# Patient Record
Sex: FEMALE | Race: WHITE | Employment: OTHER | URBAN - METROPOLITAN AREA
[De-identification: names, ages, dates, MRNs, and addresses within clinical notes are randomized per-mention and may not be internally consistent; named-entity substitution may affect disease eponyms.]

---

## 2017-01-09 ENCOUNTER — TELEPHONE (OUTPATIENT)
Dept: ONCOLOGY | Facility: CLINIC | Age: 82
End: 2017-01-09

## 2017-01-09 NOTE — TELEPHONE ENCOUNTER
Prior Authorization needed for Agrylin received.  Patient to have labs and follow up prior to fill.  Called and informed patient.  Appointments scheduled with .  Lab on 1/13/17 and follow up with Dr. Pelaez on 1/19/17.      Emmanuel Lucas, RN, BSN, OCN  Oncology Care Coordinator RN  [facility]  [phone]  1/9/2017, 9:20 AM

## 2017-01-13 DIAGNOSIS — D47.3 ET (ESSENTIAL THROMBOCYTHEMIA) (H): ICD-10-CM

## 2017-01-13 LAB
ANISOCYTOSIS BLD QL SMEAR: SLIGHT
BASOPHILS # BLD AUTO: 0 10E9/L (ref 0–0.2)
BASOPHILS NFR BLD AUTO: 0.4 %
DIFFERENTIAL METHOD BLD: ABNORMAL
EOSINOPHIL # BLD AUTO: 0.2 10E9/L (ref 0–0.7)
EOSINOPHIL NFR BLD AUTO: 2.6 %
ERYTHROCYTE [DISTWIDTH] IN BLOOD BY AUTOMATED COUNT: 15 % (ref 10–15)
HCT VFR BLD AUTO: 37.4 % (ref 35–47)
HGB BLD-MCNC: 11.8 G/DL (ref 11.7–15.7)
IMM GRANULOCYTES # BLD: 0 10E9/L (ref 0–0.4)
IMM GRANULOCYTES NFR BLD: 0.3 %
LYMPHOCYTES # BLD AUTO: 2.2 10E9/L (ref 0.8–5.3)
LYMPHOCYTES NFR BLD AUTO: 23.9 %
MCH RBC QN AUTO: 27.8 PG (ref 26.5–33)
MCHC RBC AUTO-ENTMCNC: 31.6 G/DL (ref 31.5–36.5)
MCV RBC AUTO: 88 FL (ref 78–100)
MONOCYTES # BLD AUTO: 1.1 10E9/L (ref 0–1.3)
MONOCYTES NFR BLD AUTO: 11.3 %
NEUTROPHILS # BLD AUTO: 5.7 10E9/L (ref 1.6–8.3)
NEUTROPHILS NFR BLD AUTO: 61.5 %
PLATELET # BLD AUTO: 762 10E9/L (ref 150–450)
PLATELET # BLD EST: ABNORMAL 10*3/UL
RBC # BLD AUTO: 4.25 10E12/L (ref 3.8–5.2)
WBC # BLD AUTO: 9.3 10E9/L (ref 4–11)

## 2017-01-13 PROCEDURE — 36415 COLL VENOUS BLD VENIPUNCTURE: CPT | Performed by: FAMILY MEDICINE

## 2017-01-13 PROCEDURE — 85025 COMPLETE CBC W/AUTO DIFF WBC: CPT | Performed by: FAMILY MEDICINE

## 2017-01-16 DIAGNOSIS — D47.3 ET (ESSENTIAL THROMBOCYTHEMIA) (H): Primary | ICD-10-CM

## 2017-01-16 RX ORDER — ANAGRELIDE 0.5 MG/1
CAPSULE ORAL
Qty: 48 CAPSULE | Refills: 3 | Status: SHIPPED | OUTPATIENT
Start: 2017-01-16 | End: 2017-08-14

## 2017-01-16 NOTE — TELEPHONE ENCOUNTER
Prior authorization denied per Eva at [facility].  Agrylin will not be covered as there is a generic available.  Anagrelide will be covered by patient's insurance.  OK per Dr. Pelaez to use the generic.  New prescription sent to [facility] pharmacy.

## 2017-01-19 ENCOUNTER — ONCOLOGY VISIT (OUTPATIENT)
Dept: ONCOLOGY | Facility: CLINIC | Age: 82
End: 2017-01-19
Attending: INTERNAL MEDICINE
Payer: MEDICARE

## 2017-01-19 VITALS
HEIGHT: 63 IN | DIASTOLIC BLOOD PRESSURE: 74 MMHG | TEMPERATURE: 99.4 F | OXYGEN SATURATION: 97 % | HEART RATE: 60 BPM | RESPIRATION RATE: 18 BRPM | WEIGHT: 104.3 LBS | SYSTOLIC BLOOD PRESSURE: 145 MMHG | BODY MASS INDEX: 18.48 KG/M2

## 2017-01-19 DIAGNOSIS — D64.9 ANEMIA, UNSPECIFIED TYPE: ICD-10-CM

## 2017-01-19 DIAGNOSIS — D47.2 MGUS (MONOCLONAL GAMMOPATHY OF UNKNOWN SIGNIFICANCE): ICD-10-CM

## 2017-01-19 DIAGNOSIS — D47.3 ET (ESSENTIAL THROMBOCYTHEMIA) (H): Primary | ICD-10-CM

## 2017-01-19 PROCEDURE — 99214 OFFICE O/P EST MOD 30 MIN: CPT | Performed by: INTERNAL MEDICINE

## 2017-01-19 PROCEDURE — 99211 OFF/OP EST MAY X REQ PHY/QHP: CPT

## 2017-01-19 RX ORDER — ANAGRELIDE HYDROCHLORIDE 0.5 MG/1
CAPSULE ORAL
Qty: 48 CAPSULE | Refills: 3 | Status: SHIPPED | OUTPATIENT
Start: 2017-01-19 | End: 2017-06-29

## 2017-01-19 ASSESSMENT — PAIN SCALES - GENERAL: PAINLEVEL: NO PAIN (0)

## 2017-01-19 NOTE — MR AVS SNAPSHOT
After Visit Summary   1/19/2017    Maggie Baires    MRN: [id number]           Patient Information     Date Of Birth          1/10/1928        Visit Information        Provider Department      1/19/2017 2:45 PM Annabel Pelaez MD [facility]        Today's Diagnoses     ET (essential thrombocythemia) (H)    -  1     MGUS (monoclonal gammopathy of unknown significance)         Anemia, unspecified type            Follow-ups after your visit        Your next 10 appointments already scheduled     Feb 22, 2017  9:30 AM   Phone Device Check with [place] [facility]   [facility] ([facility])    [address] [address]   [phone]            May 04, 2017  1:30 PM   LAB with [facility]   [facility]    [address] [place] [id number] [phone]           Patient must bring picture ID.  Patient should be prepared to give a urine specimen  Please do not eat 10-12 hours before your appointment if you are coming in fasting for labs on lipids, cholesterol, or glucose (sugar).  Pregnant women should follow their Care Team instructions. Water with medications is okay. Do not drink coffee or other fluids.   If you have concerns about taking  your medications, please ask at office or if scheduling via Weavlyt, send a message by clicking on Secure Messaging, Message Your Care Team.            May 11, 2017 11:00 AM   Return Visit with Annabel Pelaez MD   [facility] ([facility])    [facility]  [address]  [facility] [address]   [phone]              Future tests that were ordered for you today     Open Future Orders        Priority Expected Expires Ordered    CBC with platelets differential Routine 5/1/2017 6/30/2017 1/19/2017    Basic metabolic panel Routine 5/1/2017 6/30/2017 1/19/2017    Protein electrophoresis Routine 5/1/2017  "2017            Who to contact     If you have questions or need follow up information about today's clinic visit or your schedule please contact [facility] directly at [phone].  Normal or non-critical lab and imaging results will be communicated to you by MyChart, letter or phone within 4 business days after the clinic has received the results. If you do not hear from us within 7 days, please contact the clinic through MyChart or phone. If you have a critical or abnormal lab result, we will notify you by phone as soon as possible.  Submit refill requests through Trendmeon or call your pharmacy and they will forward the refill request to us. Please allow 3 business days for your refill to be completed.          Additional Information About Your Visit        [employer] Information     T[employer]meon lets you send messages to your doctor, view your test results, renew your prescriptions, schedule appointments and more. To sign up, go to [email or web address] . Click on \"Log in\" on the left side of the screen, which will take you to the Welcome page. Then click on \"Sign up Now\" on the right side of the page.     You will be asked to enter the access code listed below, as well as some personal information. Please follow the directions to create your username and password.     Your access code is: [email or web address]  Expires: 2017  3:58 PM     Your access code will  in 90 days. If you need help or a new code, please call your [place] clinic or [phone].        Care EveryWhere ID     This is your Care EveryWhere ID. This could be used by other organizations to access your [place] medical records  [id number]        Your Vitals Were     Pulse Temperature Respirations    60 99.4  F (37.4  C) (Tympanic) 18    Height BMI (Body Mass Index) Pulse Oximetry    1.588 m (5' 2.5\") 18.76 kg/m2 97%    Breastfeeding?          No         Blood Pressure from Last 3 Encounters:   17 145/74   10/18/16 " 122/59   06/07/16 160/82    Weight from Last 3 Encounters:   01/19/17 47.31 kg (104 lb 4.8 oz)   10/18/16 48.081 kg (106 lb)   06/07/16 48.535 kg (107 lb)                 Where to get your medicines      These medications were sent to [id number] - [place], [place] - [address]  [address] [facility] [address]    Hours:  Closed 10-16-08 business to [facility] Phone:  [phone]    - AGRYLIN 0.5 MG capsule       Primary Care Provider Office Phone # Fax #    Dorian Jensen [phone]       [address] [address]        Thank you!     Thank you for choosing [facility]  for your care. Our goal is always to provide you with excellent care. Hearing back from our patients is one way we can continue to improve our services. Please take a few minutes to complete the written survey that you may receive in the mail after your visit with us. Thank you!             Your Updated Medication List - Protect others around you: Learn how to safely use, store and throw away your medicines at www.disposemymeds.org.          This list is accurate as of: 1/19/17  3:58 PM.  Always use your most recent med list.                   Brand Name Dispense Instructions for use    * anagrelide 0.5 MG capsule    AGRYLIN    48 capsule    Take 1 capsule (0.5 mg) by mouth on Monday, Wednesday, Friday, and take 2 capsules (1 mg) on Tuesday, Thursday, Saturday and Sunday.       * AGRYLIN 0.5 MG capsule   Generic drug:  anagrelide     48 capsule    Take 1 capsule (0.5 mg) by mouth on Monday, Wednesday, Friday, and take 2 capsules (1 mg) on Tuesday, Thursday, Saturday and Sunday.       aspirin 81 MG tablet     30 tablet    Take by mouth daily       atenolol 25 MG tablet    TENORMIN    90 tablet    Take 1 tablet (25 mg) by mouth daily       hypromellose 0.5 % Soln ophthalmic solution    ARTIFICIAL TEARS     Place 1 drop into both eyes 4  times daily as needed       levothyroxine 75 MCG tablet    SYNTHROID/LEVOTHROID    90 tablet    Take 1 tablet (75 mcg) by mouth daily       VITAMIN D (CHOLECALCIFEROL) PO      Take 2,000 Units by mouth daily       * Notice:  This list has 2 medication(s) that are the same as other medications prescribed for you. Read the directions carefully, and ask your doctor or other care provider to review them with you.

## 2017-01-19 NOTE — PROGRESS NOTES
"CHIEF COMPLAINT AND REASON FOR VISIT:   ET on anagrelide,   IgG kappa M protein     HISTORY OF PRESENT ILLNESS: She was diagnosed with essential thrombocytosis since 2004. She has been getting anagrelide treatment for years and her platelet count has been fluctuating between 1.3 million to around 500. She tolerates anagrelide fair. She had extreme thrombocytosis.   She was on anagrelide 1.5 mg a day, 2 of the 0.5 mg tablets in the morning, 0.5 mg tabletsin the evening, this was started mid 10/20/2009. The dose was adjusted prior to that. She has no thrombosis history. Then dose of anagrelide is 1 mg p.o. q.a.m. and 0.5 mg p.o. q.p.m. Monday through Friday and 0.5 mg p.o. bid Saturday and Sunday.   The dose was again reduced in 10/2013 due to dropping PL so she is on 0.5 mg po bid. She refuses to change to other med since it has been working well for her. She starts to have anemia in 2016. Does is further reduced.    Also accidental  M IgG kappa protein 0.3 gram in 2009, has MGUS and is on follow up.     OTHER MEDICAL PROBLEMS: Hypothyroidism, osteoarthritis, ET, cardiac dysrhythmia (SSS), pacemaker years ago, cataract. P. HTN. CAD in 11/2013, heart attack 8/2014    MEDICATIONS: Reviewed in Epic system.     ALLERGIES: Andrea had extensive skin reaction.      SOCIAL HISTORY: Retired, very active lady. Lives in [place].  She lives in an apartment. She is . Her daughter is close by.     REVIEW OF SYSTEMS: She is in her usual state of health. Claims she is full of energy.  he is faithfully taking anagrelide.   She denied any bleeding episode, any thrombosis episode, any paresthesia of her fingertips and toes, any bone pain, constipation.   She has irregular BM.   She volunteers in the community.     PHYSICAL EXAMINATION:   VITAL SIGNS: Blood pressure 145/74, pulse 60, temperature 99.4  F (37.4  C), temperature source Tympanic, resp. rate 18, height 1.588 m (5' 2.5\"), weight 47.31 kg (104 lb 4.8 oz), " SpO2 97 %, not currently breastfeeding.  GENERAL APPEARANCE: Elderly lady, looks much younger than her stated age, not in acute distress. She always looks very thin but healthy.   EXTREMITIES: Multiple varicose veins and joints deformity.  HEENT: The patient is normocephalic, atraumatic. Pupils are equal react to light. Sclerae are anicteric. Moist oral mucosa. Negative pharynx. No oral thrush.   NECK: Supple. No jugular venous distention. Thyroid is not palpable.   LYMPH NODES: Superficial lymphadenopathy is not appreciable in the bilateral cervical, supraclavicular, axillary or inguinal adenopathy.   CARDIOVASCULAR: S1, S2 regular with no murmurs or gallops. No carotid or abdominal bruits. Left upper CW pacemaker.   PULMONARY: Lungs are clear to auscultation and percussion bilaterally. There is no wheezing or rhonchi.   GASTROINTESTINAL: Abdomen is soft, nontender. No hepatosplenomegaly. No signs of ascites. No mass appreciable.   NEUROLOGIC: Cranial nerves II-XII are grossly intact. Sensation intact. Muscle strength and muscle tone symmetrical all through 5/5.   BACK: No spinal or paraspinal tenderness. No CVA tenderness.   SKIN: bulging erythematous rash on nasol upper bridge.    CURRENT LAB DATA:   1/2017 wbc/hb nl, -800    OLD DATA REVIEW IN SUMMARY:  Serum protein electrophoresis 0.3 in 4/2016, in 10/2015 stabilized 0.2 gm stable     Immunofixation indicating 0.3 gm IgG kappa in 10/2009. She was thrombocytopenic in the later part of 2009, platelets between 100 and 130 when anagrelide dose was adjusted.     ASSESSMENT AND PLAN:   1. Essential thrombocythemia (ET) since 2004. She has been anagrelide for yrs, dose has been reducing.   She is on 0.5 mg po Mon, Wed, Friday, 1 mg the rest of wk.     She understand ET is one form of myeloproliferative disease and she needs to be watched for transforming into leukemia and she needs to be on this platelet agent for the rest of her life and her disease may  change. She needs to see hematology for the rest of her life.     2. Monoclonal gammopathy of unknown significance (MGUS). Monitor her for end organ function and at this point will see her every 6 months.     3.normacytic anemia new in 2016. Likely anagrelide side effects, dose reduction helped this.

## 2017-01-19 NOTE — NURSING NOTE
"Maggie Baires is a 89 year old female who presents for:  Chief Complaint   Patient presents with     Hematology     9 month recheck Anemia, review labs         Initial Vitals:  /74 mmHg  Pulse 60  Temp(Src) 99.4  F (37.4  C) (Tympanic)  Resp 18  Ht 1.588 m (5' 2.5\")  Wt 47.31 kg (104 lb 4.8 oz)  BMI 18.76 kg/m2  SpO2 97%  Breastfeeding? No Estimated body mass index is 18.76 kg/(m^2) as calculated from the following:    Height as of this encounter: 1.588 m (5' 2.5\").    Weight as of this encounter: 47.31 kg (104 lb 4.8 oz).. Body surface area is 1.44 meters squared. BP completed using cuff size: regular  No Pain (0) No LMP recorded. Patient is postmenopausal. Allergies and medications reviewed.     Medications: Medication refills not needed today.  Pharmacy name entered into [employer]: [id number] [address] [address] [facility] [address]    Comments: 9 month recheck Anemia, review labs. Patient has questions about why she is here and what's wrong with her?     10  minutes for nursing intake (face to face time)   Bebe Newsome CMA          "

## 2017-01-20 NOTE — NURSING NOTE
Agrylin not covered by insurance.  Pt must try generic first, ok per Dr. Pelaez.  Gave telephone okay to [facility] pharmacy in [place] to give generic of Agrylin to pt.

## 2017-01-26 ENCOUNTER — TELEPHONE (OUTPATIENT)
Dept: ONCOLOGY | Facility: CLINIC | Age: 82
End: 2017-01-26

## 2017-01-26 NOTE — TELEPHONE ENCOUNTER
Patient is very unhappy that the Agrylin is not covered by her insurance. She states she will only take the brand name, will not take the generic version.  Patient would like us to try to have her insurance company authorize the brand name again.    Prior authorization continues to be denied, appeal denied.  The insurance company states they will not authorize the brand name as there is a generic form available and not a documented reason why the patient is unable to take the generic form.  Dr. Pelaez has authorized a switch to the generic form.

## 2017-01-26 NOTE — TELEPHONE ENCOUNTER
Pt requesting to leave message that she is aware that Agrylin has been changed to generic due to insurance coverage and that in the past  she has tried generic and it caused an itchy rash on her legs. Pt currently has 1 month of brand Agrylin which she states she will take but is not going to  take the generic due to concern that she will get another rash if she takes it.    Diana Hanson RN/[facility]

## 2017-01-27 NOTE — TELEPHONE ENCOUNTER
Received call today from Rika Graff with the [employer] line with the patient on the other line.  Rika reports that patient is going to be able to get Agrylin as non generic is covered by a glenn through [employer].  Rx will need to be sent to [facility] in the future and patient will provide pharmacy with card and information.  No other action needed by Oncology.  No other questions or concerns at this time.    Emmanuel Lucas RN, BSN, OCN  1/27/2017, 11:46 AM

## 2017-02-09 ENCOUNTER — TELEPHONE (OUTPATIENT)
Dept: FAMILY MEDICINE | Facility: CLINIC | Age: 82
End: 2017-02-09

## 2017-02-09 DIAGNOSIS — D47.3 ET (ESSENTIAL THROMBOCYTHEMIA) (H): Primary | ICD-10-CM

## 2017-02-09 NOTE — TELEPHONE ENCOUNTER
Maggie is asking for a copy of her med list to give for her rent reduction.  She says she has been told to take Ensure- 2 cans a day.  She wants this added to her list.  She will be to the office soon to  ([place]) . Please put at the .

## 2017-02-09 NOTE — TELEPHONE ENCOUNTER
Medication list updated to include 2 cans of ensure a day  Medication list placed at  for   Kristi Warren RN

## 2017-02-13 ENCOUNTER — TELEPHONE (OUTPATIENT)
Dept: ONCOLOGY | Facility: CLINIC | Age: 82
End: 2017-02-13

## 2017-02-13 NOTE — TELEPHONE ENCOUNTER
"Patient calling to report that she attempted to  Agrylin from the [facility] and they said there was no Rx and that she needed to have Oncology connect with them.  Writing nurse called pharmacy.  They were updated that patient does use \"Good Day\" assistance and brings a card with to pay for it.  No insurance coverage due to it being non-generic.  Pharmacy has to run it through insurance again as they did last time because there was partial coverage.  They did not understand how \"Good Day\" assistance work and were unfamiliar with it.  Prior Auth has been initiated.  Will wait for prior auth and call.    Emmanuel Lucas RN, BSN, OCN  2/13/2017, 9:48 AM      "

## 2017-02-13 NOTE — TELEPHONE ENCOUNTER
Received fax requesting prior auth.  Reviewed chart and Rx has not been approved in the past.  Called pharmacy back and they report that [employer] line connected with them and they are to run as cash.  At this no further information required from Oncology.  No need for PA.  Medication will be ran through as cash and patient will  using the Good Day Program.  Pharmacy has connected with patient and patient is aware.    Emmanuel Lucas RN, BSN, OCN  2/13/2017, 10:29 AM

## 2017-02-15 NOTE — TELEPHONE ENCOUNTER
Called Pt to f/u on her Agrylin medication. Pt stated that she picked up her name brand prescription for aprox $12 and has copayment assistance through Good days. Pt again stated that she is not willing to take the generic of Agrylin and will continue to fill and pay for the prescription this way moving forward. Pt states that she will give us a call back if she has any other que/concerns.

## 2017-02-22 ENCOUNTER — HOSPITAL ENCOUNTER (OUTPATIENT)
Dept: CARDIOLOGY | Facility: CLINIC | Age: 82
Discharge: HOME | End: 2017-02-22
Attending: INTERNAL MEDICINE | Admitting: INTERNAL MEDICINE
Payer: MEDICARE

## 2017-02-22 PROCEDURE — 93293 PM PHONE R-STRIP DEVICE EVAL: CPT

## 2017-02-22 PROCEDURE — 93293 PM PHONE R-STRIP DEVICE EVAL: CPT | Mod: 26 | Performed by: INTERNAL MEDICINE

## 2017-02-22 NOTE — PROGRESS NOTES
~~90 DAY PPM TELETRACE~~Appropriate AP/VS at time of check. 6 sec stripes of each pre-magnet, magnet and post-magnet were saved. Magnet response WNL. F/U scheduled for in clinic annual thresholds 5/23/17 @1:40pm w/ device RN.

## 2017-02-28 DIAGNOSIS — E03.9 HYPOTHYROIDISM, UNSPECIFIED TYPE: ICD-10-CM

## 2017-02-28 RX ORDER — LEVOTHYROXINE SODIUM 75 UG/1
75 TABLET ORAL DAILY
Qty: 14 TABLET | Refills: 0 | Status: SHIPPED | OUTPATIENT
Start: 2017-02-28 | End: 2017-03-10

## 2017-02-28 NOTE — TELEPHONE ENCOUNTER
TSH   Date Value Ref Range Status   08/18/2016 0.34 (L) 0.40 - 4.00 mU/L Final   08/25/2015 0.33 (L) 0.40 - 4.00 mU/L Final   01/22/2015 0.51 0.40 - 4.00 mU/L Final     Comment:     Effective 7/30/2014, the reference range for this assay has changed to reflect   new instrumentation/methodology.     11/29/2013 1.50 0.4 - 5.0 mU/L Final   10/15/2013 9.04 (H) 0.4 - 5.0 mU/L Final   Recorded by Dorian Jensen MD on 8/20/2016 at 12:01 PM  Please  notify of the normal results. Have her stay with the current medication and recheck in 6 months. Dorian Jensen      Refilled x14 days.  LM advising lab only tana Moffett RN

## 2017-02-28 NOTE — TELEPHONE ENCOUNTER
levothyroxine (SYNTHROID, LEVOTHROID) 75 MCG tablet     Last Written Prescription Date: 8/22/16  Last Quantity: 90, # refills: 1  Last Office Visit with DANIEL, DALE or [facility] prescribing provider:6/7/16   Next 5 appointments (look out 90 days)     May 11, 2017 11:00 AM CDT   Return Visit with Annabel Pelaez MD   [facility] ([facility])    [facility]  [address]  [facility] [place] [address]   [phone]                   TSH   Date Value Ref Range Status   08/18/2016 0.34 (L) 0.40 - 4.00 mU/L Final

## 2017-03-01 DIAGNOSIS — E03.9 HYPOTHYROIDISM: ICD-10-CM

## 2017-03-01 DIAGNOSIS — I24.9 ACS (ACUTE CORONARY SYNDROME) (H): ICD-10-CM

## 2017-03-01 LAB
T4 FREE SERPL-MCNC: 1.3 NG/DL (ref 0.76–1.46)
TSH SERPL DL<=0.005 MIU/L-ACNC: 0.34 MU/L (ref 0.4–4)

## 2017-03-01 PROCEDURE — 84443 ASSAY THYROID STIM HORMONE: CPT | Performed by: FAMILY MEDICINE

## 2017-03-01 PROCEDURE — 36415 COLL VENOUS BLD VENIPUNCTURE: CPT | Performed by: FAMILY MEDICINE

## 2017-03-01 PROCEDURE — 84439 ASSAY OF FREE THYROXINE: CPT | Performed by: FAMILY MEDICINE

## 2017-03-10 DIAGNOSIS — E03.9 HYPOTHYROIDISM, UNSPECIFIED TYPE: ICD-10-CM

## 2017-03-10 RX ORDER — LEVOTHYROXINE SODIUM 75 UG/1
75 TABLET ORAL DAILY
Qty: 90 TABLET | Refills: 3 | Status: SHIPPED | OUTPATIENT
Start: 2017-03-10 | End: 2018-01-01

## 2017-03-10 NOTE — TELEPHONE ENCOUNTER
TSH   Date Value Ref Range Status   03/01/2017 0.34 (L) 0.40 - 4.00 mU/L Final   08/18/2016 0.34 (L) 0.40 - 4.00 mU/L Final   08/25/2015 0.33 (L) 0.40 - 4.00 mU/L Final   01/22/2015 0.51 0.40 - 4.00 mU/L Final     Comment:     Effective 7/30/2014, the reference range for this assay has changed to reflect   new instrumentation/methodology.     11/29/2013 1.50 0.4 - 5.0 mU/L Final       Notes Recorded by Dorian Jensen MD on 3/2/2017 at 7:54 AM  Please let this lady no other thyroid tests are fine. No change in her medication.Dorian Jensen    Refill given.  EDIE Moffett RN

## 2017-04-09 DIAGNOSIS — I24.9 ACS (ACUTE CORONARY SYNDROME) (H): ICD-10-CM

## 2017-04-10 RX ORDER — ATENOLOL 25 MG/1
TABLET ORAL
Qty: 30 TABLET | Refills: 1 | Status: SHIPPED | OUTPATIENT
Start: 2017-04-10 | End: 2017-05-31

## 2017-04-10 NOTE — TELEPHONE ENCOUNTER
Atenolol 25 mg      Last Written Prescription Date: 12/22/16  Last Fill Quantity: 90, # refills: 0  Last Office Visit with [facility] or [facility] prescribing provider: 6/7/16  Next 5 appointments (look out 90 days)     May 11, 2017 11:00 AM CDT   Return Visit with Annabel Pelaez MD   [facility] ([facility])    [facility]  [address] [address]   [phone]                   Potassium   Date Value Ref Range Status   06/02/2016 4.1 3.4 - 5.3 mmol/L Final     Creatinine   Date Value Ref Range Status   06/02/2016 1.25 (H) 0.52 - 1.04 mg/dL Final     BP Readings from Last 3 Encounters:   01/19/17 145/74   10/18/16 122/59   06/07/16 160/82

## 2017-05-04 DIAGNOSIS — D47.2 MGUS (MONOCLONAL GAMMOPATHY OF UNKNOWN SIGNIFICANCE): ICD-10-CM

## 2017-05-04 DIAGNOSIS — D47.3 ET (ESSENTIAL THROMBOCYTHEMIA) (H): ICD-10-CM

## 2017-05-04 LAB
ANION GAP SERPL CALCULATED.3IONS-SCNC: 8 MMOL/L (ref 3–14)
BASOPHILS # BLD AUTO: 0 10E9/L (ref 0–0.2)
BASOPHILS NFR BLD AUTO: 0.3 %
BUN SERPL-MCNC: 21 MG/DL (ref 7–30)
CALCIUM SERPL-MCNC: 9.5 MG/DL (ref 8.5–10.1)
CHLORIDE SERPL-SCNC: 100 MMOL/L (ref 94–109)
CO2 SERPL-SCNC: 27 MMOL/L (ref 20–32)
CREAT SERPL-MCNC: 0.98 MG/DL (ref 0.52–1.04)
DIFFERENTIAL METHOD BLD: ABNORMAL
EOSINOPHIL # BLD AUTO: 0.3 10E9/L (ref 0–0.7)
EOSINOPHIL NFR BLD AUTO: 2.8 %
ERYTHROCYTE [DISTWIDTH] IN BLOOD BY AUTOMATED COUNT: 14.7 % (ref 10–15)
GFR SERPL CREATININE-BSD FRML MDRD: 53 ML/MIN/1.7M2
GLUCOSE SERPL-MCNC: 80 MG/DL (ref 70–99)
HCT VFR BLD AUTO: 36.4 % (ref 35–47)
HGB BLD-MCNC: 11.6 G/DL (ref 11.7–15.7)
IMM GRANULOCYTES # BLD: 0 10E9/L (ref 0–0.4)
IMM GRANULOCYTES NFR BLD: 0.3 %
LYMPHOCYTES # BLD AUTO: 2.3 10E9/L (ref 0.8–5.3)
LYMPHOCYTES NFR BLD AUTO: 24 %
MCH RBC QN AUTO: 28.5 PG (ref 26.5–33)
MCHC RBC AUTO-ENTMCNC: 31.9 G/DL (ref 31.5–36.5)
MCV RBC AUTO: 89 FL (ref 78–100)
MONOCYTES # BLD AUTO: 1.1 10E9/L (ref 0–1.3)
MONOCYTES NFR BLD AUTO: 11.5 %
NEUTROPHILS # BLD AUTO: 5.7 10E9/L (ref 1.6–8.3)
NEUTROPHILS NFR BLD AUTO: 61.1 %
PLATELET # BLD AUTO: 821 10E9/L (ref 150–450)
POTASSIUM SERPL-SCNC: 4.2 MMOL/L (ref 3.4–5.3)
RBC # BLD AUTO: 4.07 10E12/L (ref 3.8–5.2)
SODIUM SERPL-SCNC: 135 MMOL/L (ref 133–144)
WBC # BLD AUTO: 9.4 10E9/L (ref 4–11)

## 2017-05-04 PROCEDURE — 36415 COLL VENOUS BLD VENIPUNCTURE: CPT | Performed by: FAMILY MEDICINE

## 2017-05-04 PROCEDURE — 80048 BASIC METABOLIC PNL TOTAL CA: CPT | Performed by: INTERNAL MEDICINE

## 2017-05-04 PROCEDURE — 00000402 ZZHCL STATISTIC TOTAL PROTEIN: Performed by: INTERNAL MEDICINE

## 2017-05-04 PROCEDURE — 85025 COMPLETE CBC W/AUTO DIFF WBC: CPT | Performed by: FAMILY MEDICINE

## 2017-05-04 PROCEDURE — 84165 PROTEIN E-PHORESIS SERUM: CPT | Performed by: INTERNAL MEDICINE

## 2017-05-05 LAB
ALBUMIN SERPL ELPH-MCNC: 4.1 G/DL (ref 3.7–5.1)
ALPHA1 GLOB SERPL ELPH-MCNC: 0.3 G/DL (ref 0.2–0.4)
ALPHA2 GLOB SERPL ELPH-MCNC: 0.8 G/DL (ref 0.5–0.9)
B-GLOBULIN SERPL ELPH-MCNC: 0.8 G/DL (ref 0.6–1)
GAMMA GLOB SERPL ELPH-MCNC: 1.3 G/DL (ref 0.7–1.6)
M PROTEIN SERPL ELPH-MCNC: 0.3 G/DL
PROT PATTERN SERPL ELPH-IMP: ABNORMAL

## 2017-05-15 ENCOUNTER — ONCOLOGY VISIT (OUTPATIENT)
Dept: ONCOLOGY | Facility: CLINIC | Age: 82
End: 2017-05-15
Attending: INTERNAL MEDICINE
Payer: MEDICARE

## 2017-05-15 VITALS
TEMPERATURE: 98.8 F | BODY MASS INDEX: 17.63 KG/M2 | HEART RATE: 99 BPM | HEIGHT: 65 IN | DIASTOLIC BLOOD PRESSURE: 79 MMHG | OXYGEN SATURATION: 99 % | RESPIRATION RATE: 18 BRPM | WEIGHT: 105.8 LBS | SYSTOLIC BLOOD PRESSURE: 146 MMHG

## 2017-05-15 DIAGNOSIS — D47.3 ET (ESSENTIAL THROMBOCYTHEMIA) (H): Primary | ICD-10-CM

## 2017-05-15 DIAGNOSIS — D64.9 ANEMIA, UNSPECIFIED TYPE: ICD-10-CM

## 2017-05-15 DIAGNOSIS — D47.2 MGUS (MONOCLONAL GAMMOPATHY OF UNKNOWN SIGNIFICANCE): ICD-10-CM

## 2017-05-15 PROCEDURE — 99214 OFFICE O/P EST MOD 30 MIN: CPT | Performed by: INTERNAL MEDICINE

## 2017-05-15 PROCEDURE — 99211 OFF/OP EST MAY X REQ PHY/QHP: CPT

## 2017-05-15 ASSESSMENT — PAIN SCALES - GENERAL: PAINLEVEL: NO PAIN (0)

## 2017-05-15 NOTE — MR AVS SNAPSHOT
After Visit Summary   5/15/2017    Maggie Baires    MRN: [id number]           Patient Information     Date Of Birth          1/10/1928        Visit Information        Provider Department      5/15/2017 2:45 PM Annabel Pelaez MD [facility]        Today's Diagnoses     ET (essential thrombocythemia) (H)    -  1    MGUS (monoclonal gammopathy of unknown significance)        Anemia, unspecified type          Care Instructions    3 MONTHS F/U WITH LABS        Follow-ups after your visit        Your next 10 appointments already scheduled     May 23, 2017  1:40 PM CDT   [place] with [place] [facility]   [facility] ([facility])    [address] [facility] [address]   [phone]            Aug 07, 2017 11:30 AM CDT   LAB with NB LAB   [facility] ([facility]    [address] [place] [address]   [phone]           Patient must bring picture ID.  Patient should be prepared to give a urine specimen  Please do not eat 10-12 hours before your appointment if you are coming in fasting for labs on lipids, cholesterol, or glucose (sugar).  Pregnant women should follow their Care Team instructions. Water with medications is okay. Do not drink coffee or other fluids.   If you have concerns about taking  your medications, please ask at office or if scheduling via [employer], send a message by clicking on Secure Messaging, Message Your Care Team.            Aug 14, 2017 11:30 AM CDT   Return Visit with Annabel Pelaez MD   [facility] ([facility])    [facility]  [address] [facility] [address]  [facility] [address]   [phone]              Future tests that were ordered for you today     Open Future Orders        Priority Expected Expires Ordered    CBC with platelets differential Routine 8/1/2017 8/31/2017 5/15/2017    Basic metabolic panel Routine 8/1/2017 8/31/2017  "5/15/2017    Kappa and lambda light chain Routine 2017 2017 5/15/2017    Protein electrophoresis Routine 2017 2017 5/15/2017            Who to contact     If you have questions or need follow up information about today's clinic visit or your schedule please contact [facility] directly at [phone].  Normal or non-critical lab and imaging results will be communicated to you by [employer], letter or phone within 4 business days after the clinic has received the results. If you do not hear from us within 7 days, please contact the clinic through [employer] or phone. If you have a critical or abnormal lab result, we will notify you by phone as soon as possible.  Submit refill requests through [employer] or call your pharmacy and they will forward the refill request to us. Please allow 3 business days for your refill to be completed.          Additional Information About Your Visit        Honey[employer] Information     Contract Live lets you send messages to your doctor, view your test results, renew your prescriptions, schedule appointments and more. To sign up, go to [email or web address] . Click on \"Log in\" on the left side of the screen, which will take you to the Welcome page. Then click on \"Sign up Now\" on the right side of the page.     You will be asked to enter the access code listed below, as well as some personal information. Please follow the directions to create your username and password.     Your access code is: [email or web address]  Expires: 2017  3:48 PM     Your access code will  in 90 days. If you need help or a new code, please call your [place] clinic or [phone].        Care EveryWhere ID     This is your Care EveryWhere ID. This could be used by other organizations to access your [place] medical records  [id number]        Your Vitals Were     Pulse Temperature Respirations Height Pulse Oximetry Breastfeeding?    99 98.8  F (37.1  C) (Tympanic) 18 1.657 m (5' 5.25\") 99% No    " BMI (Body Mass Index)                   17.47 kg/m2            Blood Pressure from Last 3 Encounters:   05/15/17 146/79   01/19/17 145/74   10/18/16 122/59    Weight from Last 3 Encounters:   05/15/17 48 kg (105 lb 12.8 oz)   01/19/17 47.3 kg (104 lb 4.8 oz)   10/18/16 48.1 kg (106 lb)               Primary Care Provider Office Phone # Fax #    Dorian Jensen [phone]       [address] [address]        Thank you!     Thank you for choosing [facility]  for your care. Our goal is always to provide you with excellent care. Hearing back from our patients is one way we can continue to improve our services. Please take a few minutes to complete the written survey that you may receive in the mail after your visit with us. Thank you!             Your Updated Medication List - Protect others around you: Learn how to safely use, store and throw away your medicines at www.disposemymeds.org.          This list is accurate as of: 5/15/17  3:48 PM.  Always use your most recent med list.                   Brand Name Dispense Instructions for use    * anagrelide 0.5 MG capsule    AGRYLIN    48 capsule    Take 1 capsule (0.5 mg) by mouth on Monday, Wednesday, Friday, and take 2 capsules (1 mg) on Tuesday, Thursday, Saturday and Sunday.       * AGRYLIN 0.5 MG capsule   Generic drug:  anagrelide     48 capsule    Take 1 capsule (0.5 mg) by mouth on Monday, Wednesday, Friday, and take 2 capsules (1 mg) on Tuesday, Thursday, Saturday and Sunday.       aspirin 81 MG tablet     30 tablet    Take by mouth daily       atenolol 25 MG tablet    TENORMIN    30 tablet    TAKE ONE TABLET BY MOUTH ONE TIME DAILY       hypromellose 0.5 % Soln ophthalmic solution    ARTIFICIAL TEARS     Place 1 drop into both eyes 4 times daily as needed       levothyroxine 75 MCG tablet    SYNTHROID/LEVOTHROID    90 tablet    Take 1 tablet (75 mcg) by mouth daily       NEW  MED     60 Can    Reported on 5/15/2017       VITAMIN D (CHOLECALCIFEROL) PO      Take 2,000 Units by mouth daily       * Notice:  This list has 2 medication(s) that are the same as other medications prescribed for you. Read the directions carefully, and ask your doctor or other care provider to review them with you.

## 2017-05-15 NOTE — PATIENT INSTRUCTIONS
We would like to see you back in 3 months with labs prior. When you are in need of a refill, please call your pharmacy and they will send us a request.  Copy of appointments, and after visit summary (AVS) given to patient.  If you have any questions please call Galina Dacosta RN, BSN, OCN Oncology Hematology  [facility] [phone]. For questions after business hours, or on holidays/weekends, please call our after hours Nurse Triage line [phone]. Thank you.       3 MONTHS F/U WITH LABS

## 2017-05-15 NOTE — PROGRESS NOTES
"CHIEF COMPLAINT AND REASON FOR VISIT:   ET on anagrelide,   IgG kappa M protein     HISTORY OF PRESENT ILLNESS: She was diagnosed with essential thrombocytosis since 2004. She has been getting anagrelide treatment for years and her platelet count has been fluctuating between 1.3 million to around 500. She tolerates anagrelide fair. She had extreme thrombocytosis.   She was on anagrelide 1.5 mg a day, 2 of the 0.5 mg tablets in the morning, 0.5 mg tabletsin the evening, this was started mid 10/20/2009. The dose was adjusted prior to that. She has no thrombosis history. Then dose of anagrelide is 1 mg p.o. q.a.m. and 0.5 mg p.o. q.p.m. Monday through Friday and 0.5 mg p.o. bid Saturday and Sunday.   The dose was again reduced in 10/2013 due to dropping PL so she is on 0.5 mg po bid. She refuses to change to other med since it has been working well for her. She starts to have anemia in 2016. Does is further reduced.    Also accidental  M IgG kappa protein 0.3 gram in 2009, has MGUS and is on follow up.     OTHER MEDICAL PROBLEMS: Hypothyroidism, osteoarthritis, ET, cardiac dysrhythmia (SSS), pacemaker years ago, cataract. P. HTN. CAD in 11/2013, heart attack 8/2014    MEDICATIONS: Reviewed in Epic system.     ALLERGIES: Andrea had extensive skin reaction.      SOCIAL HISTORY: Retired, very active lady. Lives in [place].  She lives in an apartment. She is . Her daughter is close by.     REVIEW OF SYSTEMS:   She is in her usual state of health.   she is faithfully taking anagrelide.   She denied any bleeding episode, any thrombosis episode, any paresthesia of her fingertips and toes, any bone pain, constipation.   She has irregular BM.   She volunteers in the community.     PHYSICAL EXAMINATION:   VITAL SIGNS: Blood pressure 146/79, pulse 99, temperature 98.8  F (37.1  C), temperature source Tympanic, resp. rate 18, height 1.657 m (5' 5.25\"), weight 48 kg (105 lb 12.8 oz), SpO2 99 %, not currently " breastfeeding.  GENERAL APPEARANCE: Elderly lady, looks much younger than her stated age, not in acute distress. She always looks very thin but healthy.   EXTREMITIES: Multiple varicose veins and joints deformity.  HEENT: The patient is normocephalic, atraumatic. Pupils are equal react to light. Sclerae are anicteric. Moist oral mucosa. Negative pharynx. No oral thrush.   NECK: Supple. No jugular venous distention. Thyroid is not palpable.   LYMPH NODES: Superficial lymphadenopathy is not appreciable in the bilateral cervical, supraclavicular, axillary or inguinal adenopathy.   CARDIOVASCULAR: S1, S2 regular with no murmurs or gallops. No carotid or abdominal bruits. Left upper CW pacemaker.   PULMONARY: Lungs are clear to auscultation and percussion bilaterally. There is no wheezing or rhonchi.   GASTROINTESTINAL: Abdomen is soft, nontender. No hepatosplenomegaly. No signs of ascites. No mass appreciable.   NEUROLOGIC: Cranial nerves II-XII are grossly intact. Sensation intact. Muscle strength and muscle tone symmetrical all through 5/5.   BACK: No spinal or paraspinal tenderness. No CVA tenderness.   SKIN: bulging erythematous rash on nasol upper bridge.    CURRENT LAB DATA:    wbc/hb nl, -800  Bmp nl Ca and Cr  spep 0.3 STABLE    OLD DATA REVIEW IN SUMMARY:  Serum protein electrophoresis 0.3 in 4/2016, in 10/2015 stabilized 0.2 gm stable     Immunofixation indicating 0.3 gm IgG kappa in 10/2009. She was thrombocytopenic in the later part of 2009, platelets between 100 and 130 when anagrelide dose was adjusted.     ASSESSMENT AND PLAN:   1. Essential thrombocythemia (ET) since 2004. She has been anagrelide for yrs, dose has been reducing.   She is on 0.5 mg po Mon, Wed, Friday, 1 mg the rest of wk.     She understand ET is one form of myeloproliferative disease and she needs to be watched for transforming into leukemia and she needs to be on this platelet agent for the rest of her life and her disease may  change. She needs to see hematology for the rest of her life.     2. Monoclonal gammopathy of unknown significance (MGUS). Monitor her for end organ function and at this point will see her every 3 months.     3.normacytic anemia new in 2016. Likely anagrelide side effects, dose reduction helped this. THIS SO FAR IS STABLE.

## 2017-05-15 NOTE — NURSING NOTE
"Oncology Rooming Note    May 15, 2017 3:21 PM   Maggie Baires is a 89 year old female who presents for:    Chief Complaint   Patient presents with     Hematology     4 month recheck Anemia, review labs     Initial Vitals: /79 (BP Location: Right arm, Patient Position: Chair, Cuff Size: Adult Regular)  Pulse 99  Temp 98.8  F (37.1  C) (Tympanic)  Resp 18  Ht 1.657 m (5' 5.25\")  Wt 48 kg (105 lb 12.8 oz)  SpO2 99%  Breastfeeding? No  BMI 17.47 kg/m2 Estimated body mass index is 17.47 kg/(m^2) as calculated from the following:    Height as of this encounter: 1.657 m (5' 5.25\").    Weight as of this encounter: 48 kg (105 lb 12.8 oz). Body surface area is 1.49 meters squared.  No Pain (0) Comment: Data Unavailable   No LMP recorded. Patient is postmenopausal.  Allergies reviewed: Yes  Medications reviewed: Yes    Medications: Medication refills not needed today.  Pharmacy name entered into [employer]: [id number] [address] [place]    Clinical concerns 4 month recheck Anemia, review labs    7  minutes for nursing intake (face to face time)     Bebe Newsome CMA              "

## 2017-05-22 ENCOUNTER — OFFICE VISIT (OUTPATIENT)
Dept: FAMILY MEDICINE | Facility: CLINIC | Age: 82
End: 2017-05-22
Payer: MEDICARE

## 2017-05-22 VITALS
HEART RATE: 60 BPM | DIASTOLIC BLOOD PRESSURE: 90 MMHG | WEIGHT: 105 LBS | HEIGHT: 65 IN | BODY MASS INDEX: 17.49 KG/M2 | SYSTOLIC BLOOD PRESSURE: 160 MMHG | OXYGEN SATURATION: 99 % | TEMPERATURE: 98.9 F

## 2017-05-22 DIAGNOSIS — M54.2 CERVICALGIA: Primary | ICD-10-CM

## 2017-05-22 PROCEDURE — 99212 OFFICE O/P EST SF 10 MIN: CPT | Performed by: NURSE PRACTITIONER

## 2017-05-22 NOTE — PATIENT INSTRUCTIONS
Try a warm pack or moist towel for neck pain    Continue with massage    Follow up if symptoms do not improve or worsen.
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
## 2017-05-22 NOTE — MR AVS SNAPSHOT
After Visit Summary   5/22/2017    Maggie Baires    MRN: [id number]           Patient Information     Date Of Birth          1/10/1928        Visit Information        Provider Department      5/22/2017 2:00 PM Viktoriya Mas APRN CNP [facility]        Care Instructions    Try a warm pack or moist towel for neck pain    Continue with massage    Follow up if symptoms do not improve or worsen.          Follow-ups after your visit        Your next 10 appointments already scheduled     May 23, 2017  1:40 PM CDT   [place] with [place] [facility]   [facility] ([facility])    [address] [facility] [address]   [phone]            Aug 07, 2017 11:30 AM CDT   LAB with NB LAB   [facility] ([facility])    [address] [place] [id number] [phone]           Patient must bring picture ID.  Patient should be prepared to give a urine specimen  Please do not eat 10-12 hours before your appointment if you are coming in fasting for labs on lipids, cholesterol, or glucose (sugar).  Pregnant women should follow their Care Team instructions. Water with medications is okay. Do not drink coffee or other fluids.   If you have concerns about taking  your medications, please ask at office or if scheduling via [employer], send a message by clicking on Secure Messaging, Message Your Care Team.            Aug 14, 2017 11:30 AM CDT   Return Visit with Annabel Pelaez MD   [facility] ([facility])    [facility]  [address] [facility] [address]  [facility] [address]   [phone]              Who to contact     If you have questions or need follow up information about today's clinic visit or your schedule please contact [facility] directly at [phone].  Normal or non-critical lab and imaging results will be communicated to you by [employer],  "letter or phone within 4 business days after the clinic has received the results. If you do not hear from us within 7 days, please contact the clinic through [employer] or phone. If you have a critical or abnormal lab result, we will notify you by phone as soon as possible.  Submit refill requests through [employer] or call your pharmacy and they will forward the refill request to us. Please allow 3 business days for your refill to be completed.          Additional Information About Your Visit        [employer] Information     [employer] lets you send messages to your doctor, view your test results, renew your prescriptions, schedule appointments and more. To sign up, go to [email or web address] . Click on \"Log in\" on the left side of the screen, which will take you to the Welcome page. Then click on \"Sign up Now\" on the right side of the page.     You will be asked to enter the access code listed below, as well as some personal information. Please follow the directions to create your username and password.     Your access code is: [email or web address]  Expires: 2017  3:48 PM     Your access code will  in 90 days. If you need help or a new code, please call your [place] clinic or [phone].        Care EveryWhere ID     This is your Care EveryWhere ID. This could be used by other organizations to access your [place] medical records  [id number]        Your Vitals Were     Pulse Temperature Height Pulse Oximetry BMI (Body Mass Index)       60 98.9  F (37.2  C) (Tympanic) 5' 5.25\" (1.657 m) 99% 17.34 kg/m2        Blood Pressure from Last 3 Encounters:   17 160/90   05/15/17 146/79   17 145/74    Weight from Last 3 Encounters:   17 105 lb (47.6 kg)   05/15/17 105 lb 12.8 oz (48 kg)   17 104 lb 4.8 oz (47.3 kg)              Today, you had the following     No orders found for display       Primary Care Provider Office Phone # Fax #    Dorian Jensen [phone]    "    [place] [address] [address] [facility] [address]        Thank you!     Thank you for choosing [facility]  for your care. Our goal is always to provide you with excellent care. Hearing back from our patients is one way we can continue to improve our services. Please take a few minutes to complete the written survey that you may receive in the mail after your visit with us. Thank you!             Your Updated Medication List - Protect others around you: Learn how to safely use, store and throw away your medicines at www.disposemymeds.org.          This list is accurate as of: 5/22/17  2:21 PM.  Always use your most recent med list.                   Brand Name Dispense Instructions for use    * anagrelide 0.5 MG capsule    AGRYLIN    48 capsule    Take 1 capsule (0.5 mg) by mouth on Monday, Wednesday, Friday, and take 2 capsules (1 mg) on Tuesday, Thursday, Saturday and Sunday.       * AGRYLIN 0.5 MG capsule   Generic drug:  anagrelide     48 capsule    Take 1 capsule (0.5 mg) by mouth on Monday, Wednesday, Friday, and take 2 capsules (1 mg) on Tuesday, Thursday, Saturday and Sunday.       aspirin 81 MG tablet     30 tablet    Take by mouth daily       atenolol 25 MG tablet    TENORMIN    30 tablet    TAKE ONE TABLET BY MOUTH ONE TIME DAILY       hypromellose 0.5 % Soln ophthalmic solution    ARTIFICIAL TEARS     Place 1 drop into both eyes 4 times daily as needed       levothyroxine 75 MCG tablet    SYNTHROID/LEVOTHROID    90 tablet    Take 1 tablet (75 mcg) by mouth daily       NEW MED     60 Can    Reported on 5/15/2017       VITAMIN D (CHOLECALCIFEROL) PO      Take 2,000 Units by mouth daily       * Notice:  This list has 2 medication(s) that are the same as other medications prescribed for you. Read the directions carefully, and ask your doctor or other care provider to review them with you.

## 2017-05-22 NOTE — NURSING NOTE
"Chief Complaint   Patient presents with     Cough       Initial /90 (Cuff Size: Adult Regular)  Pulse 60  Temp 98.9  F (37.2  C) (Tympanic)  Ht 5' 5.25\" (1.657 m)  Wt 105 lb (47.6 kg)  SpO2 99%  BMI 17.34 kg/m2 Estimated body mass index is 17.34 kg/(m^2) as calculated from the following:    Height as of this encounter: 5' 5.25\" (1.657 m).    Weight as of this encounter: 105 lb (47.6 kg).  Medication Reconciliation: complete    "

## 2017-05-23 ENCOUNTER — DOCUMENTATION ONLY (OUTPATIENT)
Dept: CARDIOLOGY | Facility: CLINIC | Age: 82
End: 2017-05-23

## 2017-05-23 ENCOUNTER — HOSPITAL ENCOUNTER (OUTPATIENT)
Dept: CARDIOLOGY | Facility: CLINIC | Age: 82
Discharge: HOME | End: 2017-05-23
Attending: INTERNAL MEDICINE | Admitting: INTERNAL MEDICINE
Payer: MEDICARE

## 2017-05-23 PROCEDURE — 93288 INTERROG EVL PM/LDLS PM IP: CPT

## 2017-05-23 PROCEDURE — 93288 INTERROG EVL PM/LDLS PM IP: CPT | Mod: 26 | Performed by: INTERNAL MEDICINE

## 2017-05-23 NOTE — PROGRESS NOTES
[place] Accent SR (AAI) Pacemaker Device Check/ [place]  AP: 91 %   Mode: AAIR        Underlying Rhythm: SB  Heart Rate: Histogram shows little distribution beyond base rate 60-70 bpm. Pt states she is very active and has no concerns  Sensing: Stabel    Pacing Threshold: Stable   Impedance: WNL  Battery Status: 10-11 years estimated longevity  Atrial Arrhythmia: 7 high rate episodes logged. Device logs as VHR; single lead in the atrium. Tracings show PAT  Ventricular Arrhythmia: NONE  Setting Change: NONE    Care Plan: office teletrace in 3 months

## 2017-05-31 DIAGNOSIS — I24.9 ACS (ACUTE CORONARY SYNDROME) (H): ICD-10-CM

## 2017-05-31 RX ORDER — ATENOLOL 25 MG/1
25 TABLET ORAL DAILY
Qty: 30 TABLET | Refills: 1 | Status: SHIPPED | OUTPATIENT
Start: 2017-05-31 | End: 2017-07-28

## 2017-05-31 NOTE — TELEPHONE ENCOUNTER
atenolol (TENORMIN) 25 MG tablet      Last Written Prescription Date: 4/10/17  Last Fill Quantity: 30, # refills: 1  Last Office Visit with FMDANIEL, DALE or [facility] prescribing provider: 5/22/17  Next 5 appointments (look out 90 days)     Aug 14, 2017 11:30 AM CDT   Return Visit with Annabel Pelaez MD   [facility] ([facility])    [facility]  [address] [address]   [phone]                   Potassium   Date Value Ref Range Status   05/04/2017 4.2 3.4 - 5.3 mmol/L Final     Creatinine   Date Value Ref Range Status   05/04/2017 0.98 0.52 - 1.04 mg/dL Final     BP Readings from Last 3 Encounters:   05/22/17 160/90   05/15/17 146/79   01/19/17 145/74

## 2017-06-29 DIAGNOSIS — D47.3 ET (ESSENTIAL THROMBOCYTHEMIA) (H): ICD-10-CM

## 2017-06-29 RX ORDER — ANAGRELIDE HYDROCHLORIDE 0.5 MG/1
CAPSULE ORAL
Qty: 48 CAPSULE | Refills: 3 | Status: SHIPPED | OUTPATIENT
Start: 2017-06-29 | End: 2017-11-06

## 2017-07-28 DIAGNOSIS — I24.9 ACS (ACUTE CORONARY SYNDROME) (H): ICD-10-CM

## 2017-07-28 RX ORDER — ATENOLOL 25 MG/1
TABLET ORAL
Qty: 30 TABLET | Refills: 0 | Status: SHIPPED | OUTPATIENT
Start: 2017-07-28 | End: 2017-09-07

## 2017-07-28 NOTE — TELEPHONE ENCOUNTER
ATENOLOL 25 MG TAB TEVA  Last Written Prescription Date: 5/31/2017  Last Fill Quantity: 30, # refills: 1    Last Office Visit with FMG, UMP or [facility] prescribing provider:  5/22/2017   Future Office Visit:    Next 5 appointments (look out 90 days)     Aug 14, 2017 11:30 AM CDT   Return Visit with Annabel Pelaez MD   [facility] ([facility])    [facility]  [address] [facility] [address]  [facility] [address]   [phone]                    BP Readings from Last 3 Encounters:   05/22/17 160/90   05/15/17 146/79   01/19/17 145/74

## 2017-07-28 NOTE — TELEPHONE ENCOUNTER
BP Readings from Last 6 Encounters:   05/22/17 160/90   05/15/17 146/79   01/19/17 145/74   10/18/16 122/59   06/07/16 160/82   06/02/16 157/75      Vital Signs 1/19/2017 5/15/2017 5/15/2017 5/22/2017   Pulse 60  99 60     Spoke with pharm who states atenolol 50 mg tabs available, will refill with: 50 mg take 0.5 tab daily- pharm will  pt on this.  Advised needs nurse only BP check.  EDIE Moffett, RN

## 2017-08-07 DIAGNOSIS — D47.2 MGUS (MONOCLONAL GAMMOPATHY OF UNKNOWN SIGNIFICANCE): ICD-10-CM

## 2017-08-07 DIAGNOSIS — D47.3 ET (ESSENTIAL THROMBOCYTHEMIA) (H): ICD-10-CM

## 2017-08-07 LAB
ANION GAP SERPL CALCULATED.3IONS-SCNC: 6 MMOL/L (ref 3–14)
BASOPHILS # BLD AUTO: 0 10E9/L (ref 0–0.2)
BASOPHILS NFR BLD AUTO: 0.3 %
BUN SERPL-MCNC: 17 MG/DL (ref 7–30)
CALCIUM SERPL-MCNC: 9.1 MG/DL (ref 8.5–10.1)
CHLORIDE SERPL-SCNC: 96 MMOL/L (ref 94–109)
CO2 SERPL-SCNC: 29 MMOL/L (ref 20–32)
CREAT SERPL-MCNC: 0.94 MG/DL (ref 0.52–1.04)
DIFFERENTIAL METHOD BLD: ABNORMAL
EOSINOPHIL # BLD AUTO: 0.2 10E9/L (ref 0–0.7)
EOSINOPHIL NFR BLD AUTO: 1.4 %
ERYTHROCYTE [DISTWIDTH] IN BLOOD BY AUTOMATED COUNT: 14.5 % (ref 10–15)
GFR SERPL CREATININE-BSD FRML MDRD: 56 ML/MIN/1.7M2
GLUCOSE SERPL-MCNC: 64 MG/DL (ref 70–99)
HCT VFR BLD AUTO: 35.2 % (ref 35–47)
HGB BLD-MCNC: 11.3 G/DL (ref 11.7–15.7)
IMM GRANULOCYTES # BLD: 0.1 10E9/L (ref 0–0.4)
IMM GRANULOCYTES NFR BLD: 0.6 %
LYMPHOCYTES # BLD AUTO: 1.8 10E9/L (ref 0.8–5.3)
LYMPHOCYTES NFR BLD AUTO: 17.4 %
MCH RBC QN AUTO: 28.7 PG (ref 26.5–33)
MCHC RBC AUTO-ENTMCNC: 32.1 G/DL (ref 31.5–36.5)
MCV RBC AUTO: 89 FL (ref 78–100)
MONOCYTES # BLD AUTO: 1.2 10E9/L (ref 0–1.3)
MONOCYTES NFR BLD AUTO: 11 %
NEUTROPHILS # BLD AUTO: 7.3 10E9/L (ref 1.6–8.3)
NEUTROPHILS NFR BLD AUTO: 69.3 %
PLATELET # BLD AUTO: 950 10E9/L (ref 150–450)
POTASSIUM SERPL-SCNC: 4.3 MMOL/L (ref 3.4–5.3)
RBC # BLD AUTO: 3.94 10E12/L (ref 3.8–5.2)
SODIUM SERPL-SCNC: 131 MMOL/L (ref 133–144)
WBC # BLD AUTO: 10.5 10E9/L (ref 4–11)

## 2017-08-07 PROCEDURE — 85025 COMPLETE CBC W/AUTO DIFF WBC: CPT | Performed by: FAMILY MEDICINE

## 2017-08-07 PROCEDURE — 83883 ASSAY NEPHELOMETRY NOT SPEC: CPT | Performed by: INTERNAL MEDICINE

## 2017-08-07 PROCEDURE — 36415 COLL VENOUS BLD VENIPUNCTURE: CPT | Performed by: FAMILY MEDICINE

## 2017-08-07 PROCEDURE — 84165 PROTEIN E-PHORESIS SERUM: CPT | Performed by: INTERNAL MEDICINE

## 2017-08-07 PROCEDURE — 80048 BASIC METABOLIC PNL TOTAL CA: CPT | Performed by: INTERNAL MEDICINE

## 2017-08-07 PROCEDURE — 00000402 ZZHCL STATISTIC TOTAL PROTEIN: Performed by: INTERNAL MEDICINE

## 2017-08-08 LAB
ALBUMIN SERPL ELPH-MCNC: 4.2 G/DL (ref 3.7–5.1)
ALPHA1 GLOB SERPL ELPH-MCNC: 0.3 G/DL (ref 0.2–0.4)
ALPHA2 GLOB SERPL ELPH-MCNC: 0.8 G/DL (ref 0.5–0.9)
B-GLOBULIN SERPL ELPH-MCNC: 0.8 G/DL (ref 0.6–1)
GAMMA GLOB SERPL ELPH-MCNC: 1.2 G/DL (ref 0.7–1.6)
M PROTEIN SERPL ELPH-MCNC: 0.2 G/DL
PROT PATTERN SERPL ELPH-IMP: ABNORMAL

## 2017-08-09 LAB
KAPPA LC UR-MCNC: 2.24 MG/DL (ref 0.33–1.94)
KAPPA LC/LAMBDA SER: 0.96 {RATIO} (ref 0.26–1.65)
LAMBDA LC SERPL-MCNC: 2.33 MG/DL (ref 0.57–2.63)

## 2017-08-14 ENCOUNTER — ONCOLOGY VISIT (OUTPATIENT)
Dept: ONCOLOGY | Facility: CLINIC | Age: 82
End: 2017-08-14
Attending: INTERNAL MEDICINE
Payer: MEDICARE

## 2017-08-14 ENCOUNTER — HOSPITAL ENCOUNTER (OUTPATIENT)
Dept: LAB | Facility: CLINIC | Age: 82
Discharge: HOME | End: 2017-08-14
Attending: INTERNAL MEDICINE | Admitting: INTERNAL MEDICINE
Payer: MEDICARE

## 2017-08-14 VITALS
OXYGEN SATURATION: 98 % | TEMPERATURE: 98.6 F | WEIGHT: 106.8 LBS | RESPIRATION RATE: 14 BRPM | HEIGHT: 65 IN | HEART RATE: 60 BPM | SYSTOLIC BLOOD PRESSURE: 138 MMHG | BODY MASS INDEX: 17.79 KG/M2 | DIASTOLIC BLOOD PRESSURE: 68 MMHG

## 2017-08-14 DIAGNOSIS — D47.3 ET (ESSENTIAL THROMBOCYTHEMIA) (H): ICD-10-CM

## 2017-08-14 DIAGNOSIS — D64.9 ANEMIA, UNSPECIFIED TYPE: ICD-10-CM

## 2017-08-14 DIAGNOSIS — D47.2 MGUS (MONOCLONAL GAMMOPATHY OF UNKNOWN SIGNIFICANCE): Primary | ICD-10-CM

## 2017-08-14 LAB
BASOPHILS # BLD AUTO: 0 10E9/L (ref 0–0.2)
BASOPHILS NFR BLD AUTO: 0 %
DIFFERENTIAL METHOD BLD: ABNORMAL
EOSINOPHIL # BLD AUTO: 0 10E9/L (ref 0–0.7)
EOSINOPHIL NFR BLD AUTO: 0 %
ERYTHROCYTE [DISTWIDTH] IN BLOOD BY AUTOMATED COUNT: 14.6 % (ref 10–15)
HCT VFR BLD AUTO: 35.6 % (ref 35–47)
HGB BLD-MCNC: 11.3 G/DL (ref 11.7–15.7)
LYMPHOCYTES # BLD AUTO: 2.3 10E9/L (ref 0.8–5.3)
LYMPHOCYTES NFR BLD AUTO: 23 %
MCH RBC QN AUTO: 28.8 PG (ref 26.5–33)
MCHC RBC AUTO-ENTMCNC: 31.7 G/DL (ref 31.5–36.5)
MCV RBC AUTO: 91 FL (ref 78–100)
MONOCYTES # BLD AUTO: 0.4 10E9/L (ref 0–1.3)
MONOCYTES NFR BLD AUTO: 4 %
NEUTROPHILS # BLD AUTO: 7.3 10E9/L (ref 1.6–8.3)
NEUTROPHILS NFR BLD AUTO: 73 %
PLATELET # BLD AUTO: 759 10E9/L (ref 150–450)
RBC # BLD AUTO: 3.93 10E12/L (ref 3.8–5.2)
WBC # BLD AUTO: 10 10E9/L (ref 4–11)

## 2017-08-14 PROCEDURE — 36415 COLL VENOUS BLD VENIPUNCTURE: CPT | Performed by: INTERNAL MEDICINE

## 2017-08-14 PROCEDURE — 85025 COMPLETE CBC W/AUTO DIFF WBC: CPT | Performed by: INTERNAL MEDICINE

## 2017-08-14 PROCEDURE — 99211 OFF/OP EST MAY X REQ PHY/QHP: CPT

## 2017-08-14 PROCEDURE — 99214 OFFICE O/P EST MOD 30 MIN: CPT | Performed by: INTERNAL MEDICINE

## 2017-08-14 RX ORDER — ANAGRELIDE HYDROCHLORIDE 0.5 MG/1
CAPSULE ORAL
Qty: 48 CAPSULE | Refills: 3 | Status: CANCELLED | OUTPATIENT
Start: 2017-08-14

## 2017-08-14 ASSESSMENT — PAIN SCALES - GENERAL: PAINLEVEL: MODERATE PAIN (5)

## 2017-08-14 NOTE — MR AVS SNAPSHOT
After Visit Summary   8/14/2017    Mgagie Baires    MRN: [id number]           Patient Information     Date Of Birth          1/10/1928        Visit Information        Provider Department      8/14/2017 11:30 AM Annabel Pelaez MD [facility]        Today's Diagnoses     MGUS (monoclonal gammopathy of unknown significance)    -  1    ET (essential thrombocythemia) (H)        Anemia, unspecified type          Care Instructions    Dr. Pelaez would like you to have labs completed and an Ultrasound. We will call you back with results and the plan moving forward. When you are in need of a refill, please call your pharmacy and they will send us a request.  Copy of appointments, and after visit summary (AVS) given to patient.  If you have any questions please call Violeta Soliz RN, BSN Oncology Hematology  [facility] [phone]. For questions after business hours, or on holidays/weekends, please call our after hours Nurse Triage line [phone]. Thank you.             LAB TODAY. US now. Will call on results for further plan.           Follow-ups after your visit        Your next 10 appointments already scheduled     Aug 16, 2017  9:30 AM CDT   US ABDOMEN LIMITED with [place]   [facility] ([facility])    [address] [facility] [address] [phone]           Please bring a list of your medicines (including vitamins, minerals and over-the-counter drugs). Also, tell your doctor about any allergies you may have. Wear comfortable clothes and leave your valuables at home.  Adults: No eating or drinking for 8 hours before the exam. You may take medicine with a small sip of water.  Children: - Children 6+ years: No food or drink for 6 hours before exam. - Children 1-5 years: No food or drink for 4 hours before exam. - Infants, breast-fed: may have breast milk up to 2 hours before exam. - Infants, formula: may have bottle until 4  "hours before exam.  Please call the Imaging Department at your exam site with any questions.            Aug 23, 2017  1:15 PM CDT   Teletrace with [place] [facility]   [facility] ([facility])    [address] [facility] [address] [phone]              Future tests that were ordered for you today     Open Future Orders        Priority Expected Expires Ordered    US Abdomen Limited Routine  2018            Who to contact     If you have questions or need follow up information about today's clinic visit or your schedule please contact [facility] directly at [phone].  Normal or non-critical lab and imaging results will be communicated to you by MyChart, letter or phone within 4 business days after the clinic has received the results. If you do not hear from us within 7 days, please contact the clinic through [employer] or phone. If you have a critical or abnormal lab result, we will notify you by phone as soon as possible.  Submit refill requests through HandInScan or call your pharmacy and they will forward the refill request to us. Please allow 3 business days for your refill to be completed.          Additional Information About Your Visit        [employer]Betterment Information     HandInScan lets you send messages to your doctor, view your test results, renew your prescriptions, schedule appointments and more. To sign up, go to [email or web address] . Click on \"Log in\" on the left side of the screen, which will take you to the Welcome page. Then click on \"Sign up Now\" on the right side of the page.     You will be asked to enter the access code listed below, as well as some personal information. Please follow the directions to create your username and password.     Your access code is: [email or web address]  Expires: 2017 12:06 PM     Your access code will  in 90 days. If you need help or a new code, please call your [facility] or [phone].      " "  Care EveryWhere ID     This is your Care EveryWhere ID. This could be used by other organizations to access your [facility] medical records  [id number]        Your Vitals Were     Pulse Temperature Respirations Height Pulse Oximetry Breastfeeding?    60 98.6  F (37  C) (Tympanic) 14 1.657 m (5' 5.24\") 98% No    BMI (Body Mass Index)                   17.64 kg/m2            Blood Pressure from Last 3 Encounters:   08/14/17 138/68   05/22/17 160/90   05/15/17 146/79    Weight from Last 3 Encounters:   08/14/17 48.4 kg (106 lb 12.8 oz)   05/22/17 47.6 kg (105 lb)   05/15/17 48 kg (105 lb 12.8 oz)              We Performed the Following     CBC with platelets differential        Primary Care Provider Office Phone # Fax #    Dorian Jensen [phone]       [address] [place] [address]        Equal Access to Services     SAMIA [facility] : Hadii aad ku hadasho Soomaali, waaxda luqadaha, qaybta kaalmada adeegyada, waxay margaritain hayrandy kirby . So [place] [phone].    ATENCIÓN: Si habla español, tiene a singleton disposición servicios gratuitos de asistencia lingüística. Whitney al [phone].    We comply with applicable federal civil rights laws and [place] laws. We do not discriminate on the basis of race, color, national origin, age, disability sex, sexual orientation or gender identity.            Thank you!     Thank you for choosing [facility]  for your care. Our goal is always to provide you with excellent care. Hearing back from our patients is one way we can continue to improve our services. Please take a few minutes to complete the written survey that you may receive in the mail after your visit with us. Thank you!             Your Updated Medication List - Protect others around you: Learn how to safely use, store and throw away your medicines at www.disposemymeds.org.          This list is accurate as of: 8/14/17 12:06 PM.  Always use your most recent " med list.                   Brand Name Dispense Instructions for use Diagnosis    AGRYLIN 0.5 MG capsule   Generic drug:  anagrelide     48 capsule    Take 1 capsule (0.5 mg) by mouth on Monday, Wednesday, Friday, and take 2 capsules (1 mg) on Tuesday, Thursday, Saturday and Sunday.    ET (essential thrombocythemia) (H)       aspirin 81 MG tablet     30 tablet    Take by mouth daily        atenolol 25 MG tablet    TENORMIN    30 tablet    TAKE 1 TABLET (25 MG) BY MOUTH DAILY NEEDS BP RECHECK FOR FURTHER REFILLS    ACS (acute coronary syndrome) (H)       hypromellose 0.5 % Soln ophthalmic solution    ARTIFICIAL TEARS     Place 1 drop into both eyes 4 times daily as needed        levothyroxine 75 MCG tablet    SYNTHROID/LEVOTHROID    90 tablet    Take 1 tablet (75 mcg) by mouth daily    Hypothyroidism, unspecified type       VITAMIN D (CHOLECALCIFEROL) PO      Take 2,000 Units by mouth daily

## 2017-08-14 NOTE — PATIENT INSTRUCTIONS
Dr. Pelaez would like you to have labs completed and an Ultrasound. We will call you back with results and the plan moving forward. When you are in need of a refill, please call your pharmacy and they will send us a request.  Copy of appointments, and after visit summary (AVS) given to patient.  If you have any questions please call Violeta Soliz RN, BSN Oncology Hematology  [facility] [phone]. For questions after business hours, or on holidays/weekends, please call our after hours Nurse Triage line [phone]. Thank you.             LAB TODAY. US now. Will call on results for further plan.

## 2017-08-14 NOTE — PROGRESS NOTES
"CHIEF COMPLAINT AND REASON FOR VISIT:   ET on anagrelide,   IgG kappa M protein     HISTORY OF PRESENT ILLNESS: She was diagnosed with essential thrombocytosis since 2004. She has been getting anagrelide treatment for years and her platelet count has been fluctuating between 1.3 million to around 500. She tolerates anagrelide fair. She had extreme thrombocytosis.   She was on anagrelide 1.5 mg a day, 2 of the 0.5 mg tablets in the morning, 0.5 mg tabletsin the evening, this was started mid 10/20/2009. The dose was adjusted prior to that. She has no thrombosis history. Then dose of anagrelide is 1 mg p.o. q.a.m. and 0.5 mg p.o. q.p.m. Monday through Friday and 0.5 mg p.o. bid Saturday and Sunday.   The dose was again reduced in 10/2013 due to dropping PL so she is on 0.5 mg po bid. She refuses to change to other med since it has been working well for her. She starts to have anemia in 2016. Does is further reduced.    Also accidental  M IgG kappa protein 0.3 gram in 2009, has MGUS and is on follow up.     OTHER MEDICAL PROBLEMS: Hypothyroidism, osteoarthritis, ET, cardiac dysrhythmia (SSS), pacemaker years ago, cataract. P. HTN. CAD in 11/2013, heart attack 8/2014    MEDICATIONS: Reviewed in Epic system.     ALLERGIES: Andrea had extensive skin reaction.      SOCIAL HISTORY: Retired, very active lady. Lives in [place].  She lives in an apartment. She is . Her daughter is close by.     REVIEW OF SYSTEMS:   She is in her usual state of health.   she is faithfully taking anagrelide.   She denied any bleeding episode, any thrombosis episode, any paresthesia of her fingertips and toes, any bone pain, constipation.   She has constipation now.   She volunteers in the community.     PHYSICAL EXAMINATION:   VITAL SIGNS: Blood pressure 138/68, pulse 60, temperature 98.6  F (37  C), temperature source Tympanic, resp. rate 14, height 1.657 m (5' 5.24\"), weight 48.4 kg (106 lb 12.8 oz), SpO2 98 %, not currently " breastfeeding.  GENERAL APPEARANCE: Elderly lady, looks much younger than her stated age, not in acute distress. She always looks very thin but healthy.   EXTREMITIES: Multiple varicose veins and joints deformity.  HEENT: The patient is normocephalic, atraumatic. Pupils are equal react to light. Sclerae are anicteric. Moist oral mucosa. Negative pharynx. No oral thrush.   NECK: Supple. No jugular venous distention. Thyroid is not palpable.   LYMPH NODES: Superficial lymphadenopathy is not appreciable in the bilateral cervical, supraclavicular, axillary or inguinal adenopathy.   CARDIOVASCULAR: S1, S2 regular with no murmurs or gallops. No carotid or abdominal bruits. Left upper CW pacemaker.   PULMONARY: Lungs are clear to auscultation and percussion bilaterally. There is no wheezing or rhonchi.   GASTROINTESTINAL: Abdomen is soft, nontender. No hepatosplenomegaly. No signs of ascites. No mass appreciable.   NEUROLOGIC: Cranial nerves II-XII are grossly intact. Sensation intact. Muscle strength and muscle tone symmetrical all through 5/5.   BACK: No spinal or paraspinal tenderness. No CVA tenderness.   SKIN: bulging erythematous rash on nasol upper bridge.    CURRENT LAB DATA REVIEWED  wbc/hb fine, + from 700-800  Bmp nl Ca and Cr  spep 0.2 from 0.3 STABLE    OLD DATA REVIEW IN SUMMARY:  Serum protein electrophoresis 0.3 in 4/2016, in 10/2015 stabilized 0.2 gm stable     Immunofixation indicating 0.3 gm IgG kappa in 10/2009. She was thrombocytopenic in the later part of 2009, platelets between 100 and 130 when anagrelide dose was adjusted.     ASSESSMENT AND PLAN:   1. Essential thrombocythemia (ET) since 2004. She has been anagrelide for yrs, dose has been reducing.   She is on 0.5 mg po Mon, Wed, Friday, 1 mg the rest of wk.     She understand ET is one form of myeloproliferative disease and she needs to be watched for transforming into leukemia and she needs to be on this platelet agent for the rest of her  life and her disease may change. She needs to see hematology for the rest of her life.     Her PL is up. Will repeat it along with US to see if she has d/e progression.     2. Monoclonal gammopathy of unknown significance (MGUS). Monitor her for end organ function and at this point will see her every 3 months.     3.normacytic anemia new in 2016. Likely anagrelide side effects, dose reduction helped this. THIS SO FAR IS STABLE.

## 2017-08-14 NOTE — NURSING NOTE
"Oncology Rooming Note    August 14, 2017 11:31 AM   Maggie Baires is a 89 year old female who presents for:    Chief Complaint   Patient presents with     Oncology Clinic Visit     3 month Anemia, review Labs      Initial Vitals: /68 (BP Location: Right arm, Patient Position: Sitting, Cuff Size: Adult Regular)  Pulse 60  Temp 98.6  F (37  C) (Tympanic)  Resp 14  Ht 1.657 m (5' 5.24\")  Wt 48.4 kg (106 lb 12.8 oz)  SpO2 98%  Breastfeeding? No  BMI 17.64 kg/m2 Estimated body mass index is 17.64 kg/(m^2) as calculated from the following:    Height as of this encounter: 1.657 m (5' 5.24\").    Weight as of this encounter: 48.4 kg (106 lb 12.8 oz). Body surface area is 1.49 meters squared.  Moderate Pain (5) Comment: left side    No LMP recorded. Patient is postmenopausal.  Allergies reviewed: Yes  Medications reviewed: Yes    Medications: Medication refills not needed today.  Pharmacy name entered into [facility]: [id number] [address] [facility]    Clinical concerns 3 month Anemia, review Labs   1. Patient requesting refill of Agrylin.   2. Toenails have color changes.   3. Cold all the time feet & hands.     7 minutes for nursing intake (face to face time)     Bebe Newsome [facility]              "

## 2017-08-16 ENCOUNTER — HOSPITAL ENCOUNTER (OUTPATIENT)
Dept: ULTRASOUND IMAGING | Facility: CLINIC | Age: 82
Discharge: HOME | End: 2017-08-16
Attending: INTERNAL MEDICINE | Admitting: INTERNAL MEDICINE
Payer: MEDICARE

## 2017-08-16 DIAGNOSIS — D47.3 ET (ESSENTIAL THROMBOCYTHEMIA) (H): ICD-10-CM

## 2017-08-16 PROCEDURE — 76700 US EXAM ABDOM COMPLETE: CPT

## 2017-08-16 NOTE — PROGRESS NOTES
Reviewed results and recommendations with patient.  Denies questions or concerns. Will call back if any arise.  Appt set for 12.21.17 at 1015 with labs prior at 0915. Pt in agreement with this.  Appt reminder sent in mail.  Direct line provided.

## 2017-08-30 ENCOUNTER — DOCUMENTATION ONLY (OUTPATIENT)
Dept: CARDIOLOGY | Facility: CLINIC | Age: 82
End: 2017-08-30

## 2017-08-30 ENCOUNTER — HOSPITAL ENCOUNTER (OUTPATIENT)
Dept: CARDIOLOGY | Facility: CLINIC | Age: 82
Discharge: HOME | End: 2017-08-30
Attending: INTERNAL MEDICINE | Admitting: INTERNAL MEDICINE
Payer: MEDICARE

## 2017-08-30 PROCEDURE — 93293 PM PHONE R-STRIP DEVICE EVAL: CPT | Mod: 26 | Performed by: INTERNAL MEDICINE

## 2017-08-30 PROCEDURE — 93293 PM PHONE R-STRIP DEVICE EVAL: CPT
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
## 2017-09-07 ENCOUNTER — TELEPHONE (OUTPATIENT)
Dept: FAMILY MEDICINE | Facility: CLINIC | Age: 82
End: 2017-09-07

## 2017-09-07 DIAGNOSIS — I24.9 ACS (ACUTE CORONARY SYNDROME) (H): ICD-10-CM

## 2017-09-07 RX ORDER — ATENOLOL 25 MG/1
TABLET ORAL
Qty: 30 TABLET | Refills: 3 | Status: SHIPPED | OUTPATIENT
Start: 2017-09-07 | End: 2017-09-07

## 2017-09-07 RX ORDER — METOPROLOL TARTRATE 25 MG/1
25 TABLET, FILM COATED ORAL 2 TIMES DAILY
Qty: 180 TABLET | Refills: 0 | Status: SHIPPED | OUTPATIENT
Start: 2017-09-07 | End: 2018-01-01

## 2017-09-07 NOTE — TELEPHONE ENCOUNTER
Atenolol 25 mg      Last Written Prescription Date: 7/28/17  Last Fill Quantity: 30, # refills: 0    Last Office Visit with G, P or [facility] prescribing provider:  5/22/17   Future Office Visit:        BP Readings from Last 3 Encounters:   08/14/17 138/68   05/22/17 160/90   05/15/17 146/79     ]

## 2017-09-07 NOTE — TELEPHONE ENCOUNTER
Please advise alternate betablocker due to nationwide shortage.  BP Readings from Last 6 Encounters:   08/14/17 138/68   05/22/17 160/90   05/15/17 146/79   01/19/17 145/74   10/18/16 122/59   06/07/16 160/82     EDIE Moffett RN

## 2017-09-13 ENCOUNTER — ALLIED HEALTH/NURSE VISIT (OUTPATIENT)
Dept: FAMILY MEDICINE | Facility: CLINIC | Age: 82
End: 2017-09-13
Payer: MEDICARE

## 2017-09-13 DIAGNOSIS — Z23 NEED FOR PROPHYLACTIC VACCINATION AND INOCULATION AGAINST INFLUENZA: Primary | ICD-10-CM

## 2017-09-13 PROCEDURE — G0008 ADMIN INFLUENZA VIRUS VAC: HCPCS

## 2017-09-13 PROCEDURE — 99207 ZZC NO CHARGE NURSE ONLY: CPT

## 2017-09-13 PROCEDURE — 90662 IIV NO PRSV INCREASED AG IM: CPT

## 2017-09-13 NOTE — MR AVS SNAPSHOT
After Visit Summary   9/13/2017    Maggie Baires    MRN: [id number]           Patient Information     Date Of Birth          1/10/1928        Visit Information        Provider Department      9/13/2017 1:00 PM [place] CMA/LPN [facility]        Today's Diagnoses     Need for prophylactic vaccination and inoculation against influenza    -  1       Follow-ups after your visit        Your next 10 appointments already scheduled     Dec 06, 2017 10:00 AM CST   Phone Device Check with [place] [facility]   [facility] ([facility])    [address] [facility] [address] [phone]            Dec 20, 2017  1:30 PM CST   LAB with [facility]   [facility] ([facility]    [address] [place] [id number] [phone]           Patient must bring picture ID. Patient should be prepared to give a urine specimen  Please do not eat 10-12 hours before your appointment if you are coming in fasting for labs on lipids, cholesterol, or glucose (sugar). Pregnant women should follow their Care Team instructions. Water with medications is okay. Do not drink coffee or other fluids. If you have concerns about taking  your medications, please ask at office or if scheduling via Azumio, send a message by clicking on Secure Messaging, Message Your Care Team.            Dec 21, 2017  9:45 AM CST   Return Visit with Annabel Pelaez MD   [facility] ([facility])    n Medical Ctr [facility]  [address] [facility] [address]  [facility] [address] [phone]              Who to contact     If you have questions or need follow up information about today's clinic visit or your schedule please contact [facility] directly at [phone].  Normal or non-critical lab and imaging results will be communicated to you by MyChart, letter or phone within 4 business days after the clinic has  "received the results. If you do not hear from us within 7 days, please contact the clinic through miDrive or phone. If you have a critical or abnormal lab result, we will notify you by phone as soon as possible.  Submit refill requests through miDrive or call your pharmacy and they will forward the refill request to us. Please allow 3 business days for your refill to be completed.          Additional Information About Your Visit        miDrive Information     miDrive lets you send messages to your doctor, view your test results, renew your prescriptions, schedule appointments and more. To sign up, go to [email or web address] . Click on \"Log in\" on the left side of the screen, which will take you to the Welcome page. Then click on \"Sign up Now\" on the right side of the page.     You will be asked to enter the access code listed below, as well as some personal information. Please follow the directions to create your username and password.     Your access code is: [email or web address]  Expires: 2017 12:06 PM     Your access code will  in 90 days. If you need help or a new code, please call your [facility] clinic or [phone].        Care EveryWhere ID     This is your Care EveryWhere ID. This could be used by other organizations to access your [facility] medical records  [id number]         Blood Pressure from Last 3 Encounters:   17 138/68   17 160/90   05/15/17 146/79    Weight from Last 3 Encounters:   17 106 lb 12.8 oz (48.4 kg)   17 105 lb (47.6 kg)   05/15/17 105 lb 12.8 oz (48 kg)              We Performed the Following     ADMIN INFLUENZA (For MEDICARE Patients ONLY) []     FLU VACCINE, INCREASED ANTIGEN, PRESV FREE, AGE 65+ [39235]        Primary Care Provider Office Phone # Fax #    Dorian Jensen [phone]       [address] [facility] [address]        Equal Access to Services     NICOLASA REYES AH: carolina Miranda " pato иван dickliam sigala ah. So [facility] [phone].    ATENCIÓN: Si fabio erickson, tiene a snigleton disposición servicios gratuitos de asistencia lingüística. Whitney al [phone].    We comply with applicable federal civil rights laws and [place] laws. We do not discriminate on the basis of race, color, national origin, age, disability sex, sexual orientation or gender identity.            Thank you!     Thank you for choosing [facility]  for your care. Our goal is always to provide you with excellent care. Hearing back from our patients is one way we can continue to improve our services. Please take a few minutes to complete the written survey that you may receive in the mail after your visit with us. Thank you!             Your Updated Medication List - Protect others around you: Learn how to safely use, store and throw away your medicines at www.disposemymeds.org.          This list is accurate as of: 9/13/17  1:40 PM.  Always use your most recent med list.                   Brand Name Dispense Instructions for use Diagnosis    AGRYLIN 0.5 MG capsule   Generic drug:  anagrelide     48 capsule    Take 1 capsule (0.5 mg) by mouth on Monday, Wednesday, Friday, and take 2 capsules (1 mg) on Tuesday, Thursday, Saturday and Sunday.    ET (essential thrombocythemia) (H)       aspirin 81 MG tablet     30 tablet    Take by mouth daily        hypromellose 0.5 % Soln ophthalmic solution    ARTIFICIAL TEARS     Place 1 drop into both eyes 4 times daily as needed        levothyroxine 75 MCG tablet    SYNTHROID/LEVOTHROID    90 tablet    Take 1 tablet (75 mcg) by mouth daily    Hypothyroidism, unspecified type       metoprolol 25 MG tablet    LOPRESSOR    180 tablet    Take 1 tablet (25 mg) by mouth 2 times daily    ACS (acute coronary syndrome) (H)       VITAMIN D (CHOLECALCIFEROL) PO      Take 2,000 Units by mouth daily

## 2017-09-13 NOTE — PROGRESS NOTES
Injectable Influenza Immunization Documentation    1.  Are you sick today? (Fever of 100.5 or higher on the day of the clinic)   No    2.  Have you ever had Guillain-[place] Syndrome within 6 weeks of an influenza vaccionation?  No    3. Do you have a life-threatening allergy to eggs?  No    4. Do you have a life-threatening allergy to a component of the vaccine? May include antibiotics, gelatin or latex.  No     5. Have you ever had a reaction to a dose of flu vaccine that needed immediate medical attention?  No     Form completed by Kristi Denise CMA  Prior to injection verified patient identity using patient's name and date of birth.  Per orders of  , injection of flu given by Kristi Denise. Patient instructed to remain in clinic for 15 minutes afterwards, and to report any adverse reaction to me immediately.

## 2017-11-06 ENCOUNTER — TRANSFERRED RECORDS (OUTPATIENT)
Dept: HEALTH INFORMATION MANAGEMENT | Facility: CLINIC | Age: 82
End: 2017-11-06

## 2017-11-06 DIAGNOSIS — D47.3 ET (ESSENTIAL THROMBOCYTHEMIA) (H): ICD-10-CM

## 2017-11-06 RX ORDER — ANAGRELIDE HYDROCHLORIDE 0.5 MG/1
CAPSULE ORAL
Qty: 48 CAPSULE | Refills: 3 | Status: SHIPPED | OUTPATIENT
Start: 2017-11-06 | End: 2017-12-12

## 2017-11-06 NOTE — PROGRESS NOTES
Fax received from [facility] requesting a refill of Agrylin on behalf of pt.  Last refill: 6/29/17  # 48 with 3 refills at [facility].  Last office visit:  8/14/17  Next office visit:  12/21/17    This is an appropriate refill, and has been e-prescribed. Violeta Soliz RN, BSN, OCN

## 2017-11-14 ENCOUNTER — TRANSFERRED RECORDS (OUTPATIENT)
Dept: HEALTH INFORMATION MANAGEMENT | Facility: CLINIC | Age: 82
End: 2017-11-14

## 2017-11-16 ENCOUNTER — TELEPHONE (OUTPATIENT)
Dept: FAMILY MEDICINE | Facility: CLINIC | Age: 82
End: 2017-11-16

## 2017-11-16 NOTE — TELEPHONE ENCOUNTER
[facility] Audiology form signed by Dr Jensen. .Faxed back and sent to scanning

## 2017-12-04 ENCOUNTER — HOSPITAL ENCOUNTER (OUTPATIENT)
Dept: CARDIOLOGY | Facility: CLINIC | Age: 82
Discharge: HOME | End: 2017-12-04
Attending: INTERNAL MEDICINE | Admitting: INTERNAL MEDICINE
Payer: MEDICARE

## 2017-12-04 ENCOUNTER — DOCUMENTATION ONLY (OUTPATIENT)
Dept: CARDIOLOGY | Facility: CLINIC | Age: 82
End: 2017-12-04

## 2017-12-04 DIAGNOSIS — D47.3 ET (ESSENTIAL THROMBOCYTHEMIA) (H): Primary | ICD-10-CM

## 2017-12-04 DIAGNOSIS — D47.3 ET (ESSENTIAL THROMBOCYTHEMIA) (H): ICD-10-CM

## 2017-12-04 PROCEDURE — 93293 PM PHONE R-STRIP DEVICE EVAL: CPT | Mod: 26 | Performed by: INTERNAL MEDICINE

## 2017-12-04 PROCEDURE — 00000402 ZZHCL STATISTIC TOTAL PROTEIN: Performed by: INTERNAL MEDICINE

## 2017-12-04 PROCEDURE — 85025 COMPLETE CBC W/AUTO DIFF WBC: CPT | Performed by: FAMILY MEDICINE

## 2017-12-04 PROCEDURE — 84165 PROTEIN E-PHORESIS SERUM: CPT | Performed by: INTERNAL MEDICINE

## 2017-12-04 PROCEDURE — 83883 ASSAY NEPHELOMETRY NOT SPEC: CPT | Performed by: INTERNAL MEDICINE

## 2017-12-04 PROCEDURE — 93293 PM PHONE R-STRIP DEVICE EVAL: CPT

## 2017-12-04 PROCEDURE — 36415 COLL VENOUS BLD VENIPUNCTURE: CPT | Performed by: FAMILY MEDICINE

## 2017-12-04 PROCEDURE — 80048 BASIC METABOLIC PNL TOTAL CA: CPT | Performed by: INTERNAL MEDICINE

## 2017-12-04 NOTE — PROGRESS NOTES
~~90 DAY PPM PHONE TELETRACE~~Normal pacemaker function. Appropriate AP at time of check/ (AAIR). 6 sec strips of pre-magnet, magnet and post-magnet were saved. Magnet response WNL.  F/U scheduled q 3 months. No complaints at this time.

## 2017-12-05 LAB
ALBUMIN SERPL ELPH-MCNC: 4 G/DL (ref 3.7–5.1)
ALPHA1 GLOB SERPL ELPH-MCNC: 0.3 G/DL (ref 0.2–0.4)
ALPHA2 GLOB SERPL ELPH-MCNC: 0.7 G/DL (ref 0.5–0.9)
ANION GAP SERPL CALCULATED.3IONS-SCNC: 8 MMOL/L (ref 3–14)
B-GLOBULIN SERPL ELPH-MCNC: 0.7 G/DL (ref 0.6–1)
BASOPHILS # BLD AUTO: 0 10E9/L (ref 0–0.2)
BASOPHILS NFR BLD AUTO: 0 %
BUN SERPL-MCNC: 22 MG/DL (ref 7–30)
CALCIUM SERPL-MCNC: 9.3 MG/DL (ref 8.5–10.1)
CHLORIDE SERPL-SCNC: 99 MMOL/L (ref 94–109)
CO2 SERPL-SCNC: 27 MMOL/L (ref 20–32)
CREAT SERPL-MCNC: 1.04 MG/DL (ref 0.52–1.04)
DIFFERENTIAL METHOD BLD: ABNORMAL
EOSINOPHIL # BLD AUTO: 0 10E9/L (ref 0–0.7)
EOSINOPHIL NFR BLD AUTO: 0 %
ERYTHROCYTE [DISTWIDTH] IN BLOOD BY AUTOMATED COUNT: 14.9 % (ref 10–15)
GAMMA GLOB SERPL ELPH-MCNC: 1.1 G/DL (ref 0.7–1.6)
GFR SERPL CREATININE-BSD FRML MDRD: 50 ML/MIN/1.7M2
GLUCOSE SERPL-MCNC: 101 MG/DL (ref 70–99)
HCT VFR BLD AUTO: 35.3 % (ref 35–47)
HGB BLD-MCNC: 11.4 G/DL (ref 11.7–15.7)
KAPPA LC UR-MCNC: 1.91 MG/DL (ref 0.33–1.94)
KAPPA LC/LAMBDA SER: 0.86 {RATIO} (ref 0.26–1.65)
LAMBDA LC SERPL-MCNC: 2.23 MG/DL (ref 0.57–2.63)
LYMPHOCYTES # BLD AUTO: 2.4 10E9/L (ref 0.8–5.3)
LYMPHOCYTES NFR BLD AUTO: 27 %
M PROTEIN SERPL ELPH-MCNC: 0.2 G/DL
MCH RBC QN AUTO: 28.9 PG (ref 26.5–33)
MCHC RBC AUTO-ENTMCNC: 32.3 G/DL (ref 31.5–36.5)
MCV RBC AUTO: 89 FL (ref 78–100)
MONOCYTES # BLD AUTO: 0.4 10E9/L (ref 0–1.3)
MONOCYTES NFR BLD AUTO: 5 %
NEUTROPHILS # BLD AUTO: 6.1 10E9/L (ref 1.6–8.3)
NEUTROPHILS NFR BLD AUTO: 68 %
PLATELET # BLD AUTO: 936 10E9/L (ref 150–450)
POTASSIUM SERPL-SCNC: 4.1 MMOL/L (ref 3.4–5.3)
PROT PATTERN SERPL ELPH-IMP: ABNORMAL
RBC # BLD AUTO: 3.95 10E12/L (ref 3.8–5.2)
SODIUM SERPL-SCNC: 134 MMOL/L (ref 133–144)
WBC # BLD AUTO: 8.9 10E9/L (ref 4–11)

## 2017-12-12 ENCOUNTER — ONCOLOGY VISIT (OUTPATIENT)
Dept: ONCOLOGY | Facility: CLINIC | Age: 82
End: 2017-12-12
Attending: INTERNAL MEDICINE
Payer: MEDICARE

## 2017-12-12 VITALS
DIASTOLIC BLOOD PRESSURE: 56 MMHG | OXYGEN SATURATION: 99 % | BODY MASS INDEX: 17.44 KG/M2 | SYSTOLIC BLOOD PRESSURE: 138 MMHG | TEMPERATURE: 99.6 F | HEIGHT: 65 IN | RESPIRATION RATE: 12 BRPM | HEART RATE: 60 BPM | WEIGHT: 104.7 LBS

## 2017-12-12 DIAGNOSIS — D47.3 ET (ESSENTIAL THROMBOCYTHEMIA) (H): ICD-10-CM

## 2017-12-12 DIAGNOSIS — D47.2 MGUS (MONOCLONAL GAMMOPATHY OF UNKNOWN SIGNIFICANCE): Primary | ICD-10-CM

## 2017-12-12 DIAGNOSIS — D64.9 ANEMIA, UNSPECIFIED TYPE: ICD-10-CM

## 2017-12-12 PROCEDURE — 99214 OFFICE O/P EST MOD 30 MIN: CPT | Performed by: INTERNAL MEDICINE

## 2017-12-12 PROCEDURE — 99211 OFF/OP EST MAY X REQ PHY/QHP: CPT

## 2017-12-12 RX ORDER — ANAGRELIDE HYDROCHLORIDE 0.5 MG/1
CAPSULE ORAL
Qty: 60 CAPSULE | Refills: 1 | Status: SHIPPED | OUTPATIENT
Start: 2017-12-12 | End: 2018-02-07

## 2017-12-12 ASSESSMENT — PAIN SCALES - GENERAL: PAINLEVEL: EXTREME PAIN (8)

## 2017-12-12 NOTE — NURSING NOTE
"Oncology Rooming Note    December 12, 2017 2:31 PM   Maggie Baires is a 89 year old female who presents for:    Chief Complaint   Patient presents with     Hematology     4 month recheck Anemia, review Labs & US      Initial Vitals: /56 (BP Location: Right arm, Patient Position: Sitting, Cuff Size: Adult Regular)  Pulse 60  Temp 99.6  F (37.6  C) (Tympanic)  Resp 12  Ht 1.657 m (5' 5.24\")  Wt 47.5 kg (104 lb 11.2 oz)  SpO2 99%  Breastfeeding? No  BMI 17.3 kg/m2 Estimated body mass index is 17.3 kg/(m^2) as calculated from the following:    Height as of this encounter: 1.657 m (5' 5.24\").    Weight as of this encounter: 47.5 kg (104 lb 11.2 oz). Body surface area is 1.48 meters squared.  Extreme Pain (8) Comment: & legs  Bilateral    No LMP recorded. Patient is postmenopausal.  Allergies reviewed: Yes  Medications reviewed: Yes    Medications: Medication refills not needed today.  Pharmacy name entered into [employer]: [id number] [address]    Clinical concerns: 4 month recheck Anemia, review Labs & US    Patient reports she has had a hoarseness in her voice for about one month. .   7 minutes for nursing intake (face to face time)     Bebe Newsome CMA              "

## 2017-12-12 NOTE — PATIENT INSTRUCTIONS
We would like to see you back in 3 months for a follow up appointment with labs prior. When you are in need of a refill, please call your pharmacy and they will send us a request.  Copy of appointments, and after visit summary (AVS) given to patient.  If you have any questions please call Violeta Soliz RN, BSN Oncology Hematology  [facility] [phone]. For questions after business hours, or on holidays/weekends, please call our after hours Nurse Triage line [phone]. Thank you.         3 months f/u with labs.

## 2017-12-12 NOTE — LETTER
12/12/2017         RE: Maggie Baires  [address]    [facility] [facility] [place] [address]        Dear Colleague,    Thank you for referring your patient, Maggie Baires, to the [facility]. Please see a copy of my visit note below.    CHIEF COMPLAINT AND REASON FOR VISIT:   ET on anagrelide,   IgG kappa M protein     HISTORY OF PRESENT ILLNESS: She was diagnosed with essential thrombocytosis since 2004. She has been getting anagrelide treatment for years and her platelet count has been fluctuating between 1.3 million to around 500. She tolerates anagrelide fair. She had extreme thrombocytosis.   She was on anagrelide 1.5 mg a day, 2 of the 0.5 mg tablets in the morning, 0.5 mg tabletsin the evening, this was started mid 10/20/2009. The dose was adjusted prior to that. She has no thrombosis history. Then dose of anagrelide is 1 mg p.o. q.a.m. and 0.5 mg p.o. q.p.m. Monday through Friday and 0.5 mg p.o. bid Saturday and Sunday.   The dose was again reduced in 10/2013 due to dropping PL so she is on 0.5 mg po bid. She refuses to change to other med since it has been working well for her. She starts to have anemia in 2016. Does is further reduced.    Also accidental  M IgG kappa protein 0.3 gram in 2009, has MGUS and is on follow up.     OTHER MEDICAL PROBLEMS: Hypothyroidism, osteoarthritis, ET, cardiac dysrhythmia (SSS), pacemaker years ago, cataract. P. HTN. CAD in 11/2013, heart attack 8/2014    MEDICATIONS: Reviewed in Epic system.     ALLERGIES: Andrea had extensive skin reaction.      SOCIAL HISTORY: Retired, very active lady. Lives in [place].  She lives in an apartment. She is . Her daughter is close by.     REVIEW OF SYSTEMS:   She is in her usual state of health.   she is faithfully taking anagrelide.   She denied any bleeding episode, any thrombosis episode, any paresthesia of her fingertips and toes, any bone pain, constipation.   She volunteers in the community.     PHYSICAL  "EXAMINATION:   VITAL SIGNS: Blood pressure 138/56, pulse 60, temperature 99.6  F (37.6  C), temperature source Tympanic, resp. rate 12, height 1.657 m (5' 5.24\"), weight 47.5 kg (104 lb 11.2 oz), SpO2 99 %, not currently breastfeeding.  GENERAL APPEARANCE: Elderly lady, looks much younger than her stated age, not in acute distress. She always looks very thin but healthy.   EXTREMITIES: Multiple varicose veins and joints deformity.  HEENT: The patient is normocephalic, atraumatic. Pupils are equal react to light. Sclerae are anicteric. Moist oral mucosa. Negative pharynx. No oral thrush.   NECK: Supple. No jugular venous distention. Thyroid is not palpable.   LYMPH NODES: Superficial lymphadenopathy is not appreciable in the bilateral cervical, supraclavicular, axillary or inguinal adenopathy.   CARDIOVASCULAR: S1, S2 regular with no murmurs or gallops. No carotid or abdominal bruits. Left upper CW pacemaker.   PULMONARY: Lungs are clear to auscultation and percussion bilaterally. There is no wheezing or rhonchi.   GASTROINTESTINAL: Abdomen is soft, nontender. No hepatosplenomegaly. No signs of ascites. No mass appreciable.   NEUROLOGIC: Cranial nerves II-XII are grossly intact. Sensation intact. Muscle strength and muscle tone symmetrical all through 5/5.   BACK: No spinal or paraspinal tenderness. No CVA tenderness.   SKIN: bulging erythematous rash on nasol upper bridge.    CURRENT LAB DATA REVIEWED   Hb 11.4, wbc/diff nl,+ from 700-800  Bmp nl Ca and Cr  spep 0.2 from 0.3 STABLE    Current Image Reviewed  8/2017 - nl liver and spleen size.     OLD DATA REVIEW IN SUMMARY:  Serum protein electrophoresis 0.3 in 4/2016, in 10/2015 stabilized 0.2 gm stable     Immunofixation indicating 0.3 gm IgG kappa in 10/2009. She was thrombocytopenic in the later part of 2009, platelets between 100 and 130 when anagrelide dose was adjusted.     ASSESSMENT AND PLAN:   1. Essential thrombocythemia (ET) since 2004. She has " been anagrelide for yrs, dose has been reducing.   She is on 0.5 mg po Mon, Wed, Friday, 1 mg the rest of wk.   Due to gradual increasing of PL, advice her try 1 g daily in 12/2017.     She understand ET is one form of myeloproliferative disease and she needs to be watched for transforming into leukemia and she needs to be on this platelet agent for the rest of her life and her disease may change. She needs to see hematology for the rest of her life.     Her PL is up. Will repeat it along with US to see if she has d/e progression.     2. Monoclonal gammopathy of unknown significance (MGUS). Monitor her for end organ function and at this point will see her every 3 months.     3.normacytic anemia new in 2016. Likely anagrelide side effects, dose reduction helped this. THIS SO FAR IS STABLE.     Again, thank you for allowing me to participate in the care of your patient.        Sincerely,        Annabel Pelaez MD, MD

## 2017-12-12 NOTE — MR AVS SNAPSHOT
After Visit Summary   12/12/2017    Maggie Baires    MRN: [id number]           Patient Information     Date Of Birth          1/10/1928        Visit Information        Provider Department      12/12/2017 2:30 PM Annabel Pelaez MD [facility]        Today's Diagnoses     MGUS (monoclonal gammopathy of unknown significance)    -  1    ET (essential thrombocythemia) (H)        Anemia, unspecified type          Care Instructions    We would like to see you back in 3 months for a follow up appointment with labs prior. When you are in need of a refill, please call your pharmacy and they will send us a request.  Copy of appointments, and after visit summary (AVS) given to patient.  If you have any questions please call Violeta Soliz RN, BSN Oncology Hematology  [facility] [phone]. For questions after business hours, or on holidays/weekends, please call our after hours Nurse Triage line [phone]. Thank you.         3 months f/u with labs.           Follow-ups after your visit        Your next 10 appointments already scheduled     Mar 07, 2018 11:00 AM CST   [facility]    [address] [place] [address] [phone]           Please do not eat 10-12 hours before your appointment if you are coming in fasting for labs on lipids, cholesterol, or glucose (sugar). This does not apply to pregnant women. Water, hot tea and black coffee (with nothing added) are okay. Do not drink other fluids, diet soda or chew gum.            Mar 07, 2018  2:30 PM CST   Phone Device Check with [place] [facility]   [facility] ([facility])    [address] [facility] [address] [phone]            Mar 12, 2018 11:30 AM CDT   Return Visit with Annabel Pelaez MD   [facility] ([facility])    [facility]  [address] [address]  [facility] [address]   [phone]              Future tests that were ordered for you today     Open Future Orders        Priority Expected Expires Ordered    Kappa and lambda light chain Routine 3/1/2018 3/30/2018 2017    Protein electrophoresis Routine 3/1/2018 3/30/2018 2017    CBC with platelets differential Routine 3/1/2018 2018 2017    Comprehensive metabolic panel Routine 3/1/2018 2018 2017            Who to contact     If you have questions or need follow up information about today's clinic visit or your schedule please contact [facility] directly at [phone].  Normal or non-critical lab and imaging results will be communicated to you by slinksethart, letter or phone within 4 business days after the clinic has received the results. If you do not hear from us within 7 days, please contact the clinic through slinksethart or phone. If you have a critical or abnormal lab result, we will notify you by phone as soon as possible.  Submit refill requests through Overt[employer] or call your pharmacy and they will forward the refill request to us. Please allow 3 business days for your refill to be completed.          Additional Information About Your Visit        slinksethart Information     Overtone lets you send messages to your doctor, view your test results, renew your prescriptions, schedule appointments and more. To sign up, go to [email or web address] . Click on \"Log in\" on the left side of the screen, which will take you to the Welcome page. Then click on \"Sign up Now\" on the right side of the page.     You will be asked to enter the access code listed below, as well as some personal information. Please follow the directions to create your username and password.     Your access code is: [email or web address]  Expires: 3/12/2018  3:10 PM     Your access code will  in 90 days. If you need help or a new code, please call your [facility] or [phone].      " "  Care EveryWhere ID     This is your Care EveryWhere ID. This could be used by other organizations to access your [place] medical records  [id number]        Your Vitals Were     Pulse Temperature Respirations Height Pulse Oximetry Breastfeeding?    60 99.6  F (37.6  C) (Tympanic) 12 1.657 m (5' 5.24\") 99% No    BMI (Body Mass Index)                   17.3 kg/m2            Blood Pressure from Last 3 Encounters:   12/12/17 138/56   08/14/17 138/68   05/22/17 160/90    Weight from Last 3 Encounters:   12/12/17 47.5 kg (104 lb 11.2 oz)   08/14/17 48.4 kg (106 lb 12.8 oz)   05/22/17 47.6 kg (105 lb)                 Today's Medication Changes          These changes are accurate as of: 12/12/17  3:10 PM.  If you have any questions, ask your nurse or doctor.               These medicines have changed or have updated prescriptions.        Dose/Directions    AGRYLIN 0.5 MG capsule   This may have changed:  additional instructions   Used for:  ET (essential thrombocythemia) (H)   Generic drug:  anagrelide   Changed by:  Annabel Pelaez MD        Take 2 capsulea (1 mg) daily.   Quantity:  60 capsule   Refills:  1            Where to get your medicines      These medications were sent to [facility][id number] [address] [address]    Hours:  Closed 10-16-08 business to [facility] Phone:  [phone]     AGRYLIN 0.5 MG capsule                Primary Care Provider Office Phone # Fax #    Dorian Jensen [phone]       [address] [place] [address]        Equal Access to Services     SAMIA REYES AH: Alonso Ferrer, wajacida luqadaha, qaybta kaalmada blayne, liam marx. So [facility] [phone].    ATENCIÓN: Si habla español, tiene a singleton disposición servicios gratuitos de asistencia lingüística. Llame al [phone].    We comply with applicable federal civil rights laws and [place] " laws. We do not discriminate on the basis of race, color, national origin, age, disability, sex, sexual orientation, or gender identity.            Thank you!     Thank you for choosing [facility]  for your care. Our goal is always to provide you with excellent care. Hearing back from our patients is one way we can continue to improve our services. Please take a few minutes to complete the written survey that you may receive in the mail after your visit with us. Thank you!             Your Updated Medication List - Protect others around you: Learn how to safely use, store and throw away your medicines at www.disposemymeds.org.          This list is accurate as of: 12/12/17  3:10 PM.  Always use your most recent med list.                   Brand Name Dispense Instructions for use Diagnosis    AGRYLIN 0.5 MG capsule   Generic drug:  anagrelide     60 capsule    Take 2 capsulea (1 mg) daily.    ET (essential thrombocythemia) (H)       aspirin 81 MG tablet     30 tablet    Take by mouth daily        hypromellose 0.5 % Soln ophthalmic solution    ARTIFICIAL TEARS     Place 1 drop into both eyes 4 times daily as needed        levothyroxine 75 MCG tablet    SYNTHROID/LEVOTHROID    90 tablet    Take 1 tablet (75 mcg) by mouth daily    Hypothyroidism, unspecified type       metoprolol 25 MG tablet    LOPRESSOR    180 tablet    Take 1 tablet (25 mg) by mouth 2 times daily    ACS (acute coronary syndrome) (H)       VITAMIN D (CHOLECALCIFEROL) PO      Take 2,000 Units by mouth daily

## 2017-12-12 NOTE — PROGRESS NOTES
"CHIEF COMPLAINT AND REASON FOR VISIT:   ET on anagrelide,   IgG kappa M protein     HISTORY OF PRESENT ILLNESS: She was diagnosed with essential thrombocytosis since 2004. She has been getting anagrelide treatment for years and her platelet count has been fluctuating between 1.3 million to around 500. She tolerates anagrelide fair. She had extreme thrombocytosis.   She was on anagrelide 1.5 mg a day, 2 of the 0.5 mg tablets in the morning, 0.5 mg tabletsin the evening, this was started mid 10/20/2009. The dose was adjusted prior to that. She has no thrombosis history. Then dose of anagrelide is 1 mg p.o. q.a.m. and 0.5 mg p.o. q.p.m. Monday through Friday and 0.5 mg p.o. bid Saturday and Sunday.   The dose was again reduced in 10/2013 due to dropping PL so she is on 0.5 mg po bid. She refuses to change to other med since it has been working well for her. She starts to have anemia in 2016. Does is further reduced.    Also accidental  M IgG kappa protein 0.3 gram in 2009, has MGUS and is on follow up.     OTHER MEDICAL PROBLEMS: Hypothyroidism, osteoarthritis, ET, cardiac dysrhythmia (SSS), pacemaker years ago, cataract. P. HTN. CAD in 11/2013, heart attack 8/2014    MEDICATIONS: Reviewed in Epic system.     ALLERGIES: Andrea had extensive skin reaction.      SOCIAL HISTORY: Retired, very active lady. Lives in [place].  She lives in an apartment. She is . Her daughter is close by.     REVIEW OF SYSTEMS:   She is in her usual state of health.   she is faithfully taking anagrelide.   She denied any bleeding episode, any thrombosis episode, any paresthesia of her fingertips and toes, any bone pain, constipation.   She volunteers in the community.     PHYSICAL EXAMINATION:   VITAL SIGNS: Blood pressure 138/56, pulse 60, temperature 99.6  F (37.6  C), temperature source Tympanic, resp. rate 12, height 1.657 m (5' 5.24\"), weight 47.5 kg (104 lb 11.2 oz), SpO2 99 %, not currently breastfeeding.  GENERAL " APPEARANCE: Elderly lady, looks much younger than her stated age, not in acute distress. She always looks very thin but healthy.   EXTREMITIES: Multiple varicose veins and joints deformity.  HEENT: The patient is normocephalic, atraumatic. Pupils are equal react to light. Sclerae are anicteric. Moist oral mucosa. Negative pharynx. No oral thrush.   NECK: Supple. No jugular venous distention. Thyroid is not palpable.   LYMPH NODES: Superficial lymphadenopathy is not appreciable in the bilateral cervical, supraclavicular, axillary or inguinal adenopathy.   CARDIOVASCULAR: S1, S2 regular with no murmurs or gallops. No carotid or abdominal bruits. Left upper CW pacemaker.   PULMONARY: Lungs are clear to auscultation and percussion bilaterally. There is no wheezing or rhonchi.   GASTROINTESTINAL: Abdomen is soft, nontender. No hepatosplenomegaly. No signs of ascites. No mass appreciable.   NEUROLOGIC: Cranial nerves II-XII are grossly intact. Sensation intact. Muscle strength and muscle tone symmetrical all through 5/5.   BACK: No spinal or paraspinal tenderness. No CVA tenderness.   SKIN: bulging erythematous rash on nasol upper bridge.    CURRENT LAB DATA REVIEWED   Hb 11.4, wbc/diff nl,+ from 700-800  Bmp nl Ca and Cr  spep 0.2 from 0.3 STABLE    Current Image Reviewed  8/2017 - nl liver and spleen size.     OLD DATA REVIEW IN SUMMARY:  Serum protein electrophoresis 0.3 in 4/2016, in 10/2015 stabilized 0.2 gm stable     Immunofixation indicating 0.3 gm IgG kappa in 10/2009. She was thrombocytopenic in the later part of 2009, platelets between 100 and 130 when anagrelide dose was adjusted.     ASSESSMENT AND PLAN:   1. Essential thrombocythemia (ET) since 2004. She has been anagrelide for yrs, dose has been reducing.   She is on 0.5 mg po Mon, Wed, Friday, 1 mg the rest of wk.   Due to gradual increasing of PL, advice her try 1 g daily in 12/2017.     She understand ET is one form of myeloproliferative disease  and she needs to be watched for transforming into leukemia and she needs to be on this platelet agent for the rest of her life and her disease may change. She needs to see hematology for the rest of her life.     Her PL is up. Will repeat it along with US to see if she has d/e progression.     2. Monoclonal gammopathy of unknown significance (MGUS). Monitor her for end organ function and at this point will see her every 3 months.     3.normacytic anemia new in 2016. Likely anagrelide side effects, dose reduction helped this. THIS SO FAR IS STABLE.

## 2017-12-18 ENCOUNTER — TELEPHONE (OUTPATIENT)
Dept: FAMILY MEDICINE | Facility: CLINIC | Age: 82
End: 2017-12-18

## 2017-12-18 NOTE — TELEPHONE ENCOUNTER
Reason for Call:  Other     Detailed comments: Maggie says she had been referred to someone at the hospital about a year ago from Dr Jensen for something they needed to cut out of her cheek. She says she did this about a year ago and someone called her about 3 weeks ago and told her she needed to come back for a check up on this. She says she doesn't know their name. She says she can't go now but wants them to set up an appointment for this. She says she still drives but not in bad weather.     Phone Number Patient can be reached at: Home number on file [phone] (home)    Best Time: anytime        Call taken on 12/18/2017 at 9:52 AM by Ann Whipple

## 2017-12-18 NOTE — TELEPHONE ENCOUNTER
I talked with Maggie and she says that she cancelled the derm appointment for now and reschedule later  Kristi Warren RN

## 2018-01-01 ENCOUNTER — OFFICE VISIT (OUTPATIENT)
Dept: CARDIOLOGY | Facility: CLINIC | Age: 83
End: 2018-01-01
Payer: MEDICARE

## 2018-01-01 ENCOUNTER — TELEPHONE (OUTPATIENT)
Dept: FAMILY MEDICINE | Facility: CLINIC | Age: 83
End: 2018-01-01

## 2018-01-01 ENCOUNTER — HOSPITAL ENCOUNTER (OUTPATIENT)
Dept: CARDIOLOGY | Facility: CLINIC | Age: 83
Discharge: HOME | End: 2018-03-07
Attending: INTERNAL MEDICINE | Admitting: INTERNAL MEDICINE
Payer: MEDICARE

## 2018-01-01 ENCOUNTER — DOCUMENTATION ONLY (OUTPATIENT)
Dept: CARDIOLOGY | Facility: CLINIC | Age: 83
End: 2018-01-01

## 2018-01-01 ENCOUNTER — ONCOLOGY VISIT (OUTPATIENT)
Dept: ONCOLOGY | Facility: CLINIC | Age: 83
End: 2018-01-01
Attending: INTERNAL MEDICINE
Payer: MEDICARE

## 2018-01-01 ENCOUNTER — DOCUMENTATION ONLY (OUTPATIENT)
Dept: LAB | Facility: CLINIC | Age: 83
End: 2018-01-01

## 2018-01-01 ENCOUNTER — HOSPITAL ENCOUNTER (OUTPATIENT)
Facility: CLINIC | Age: 83
Setting detail: OBSERVATION
Discharge: HOME | End: 2018-07-08
Attending: STUDENT IN AN ORGANIZED HEALTH CARE EDUCATION/TRAINING PROGRAM | Admitting: INTERNAL MEDICINE
Payer: MEDICARE

## 2018-01-01 ENCOUNTER — OFFICE VISIT (OUTPATIENT)
Dept: FAMILY MEDICINE | Facility: CLINIC | Age: 83
End: 2018-01-01
Payer: MEDICARE

## 2018-01-01 ENCOUNTER — APPOINTMENT (OUTPATIENT)
Dept: GENERAL RADIOLOGY | Facility: CLINIC | Age: 83
End: 2018-01-01
Attending: STUDENT IN AN ORGANIZED HEALTH CARE EDUCATION/TRAINING PROGRAM
Payer: MEDICARE

## 2018-01-01 ENCOUNTER — TELEPHONE (OUTPATIENT)
Dept: ONCOLOGY | Facility: CLINIC | Age: 83
End: 2018-01-01

## 2018-01-01 ENCOUNTER — RADIANT APPOINTMENT (OUTPATIENT)
Dept: GENERAL RADIOLOGY | Facility: CLINIC | Age: 83
End: 2018-01-01
Attending: FAMILY MEDICINE
Payer: MEDICARE

## 2018-01-01 ENCOUNTER — HOSPITAL ENCOUNTER (OUTPATIENT)
Dept: LAB | Facility: CLINIC | Age: 83
Discharge: HOME | End: 2018-11-05
Attending: INTERNAL MEDICINE | Admitting: INTERNAL MEDICINE
Payer: MEDICARE

## 2018-01-01 ENCOUNTER — MEDICAL CORRESPONDENCE (OUTPATIENT)
Dept: HEALTH INFORMATION MANAGEMENT | Facility: CLINIC | Age: 83
End: 2018-01-01

## 2018-01-01 ENCOUNTER — HOSPITAL ENCOUNTER (OUTPATIENT)
Dept: CARDIOLOGY | Facility: CLINIC | Age: 83
Discharge: HOME | End: 2018-06-06
Attending: INTERNAL MEDICINE | Admitting: INTERNAL MEDICINE
Payer: MEDICARE

## 2018-01-01 ENCOUNTER — HOSPITAL ENCOUNTER (OUTPATIENT)
Dept: CARDIOLOGY | Facility: CLINIC | Age: 83
Discharge: HOME | End: 2018-12-12
Attending: INTERNAL MEDICINE | Admitting: INTERNAL MEDICINE
Payer: MEDICARE

## 2018-01-01 ENCOUNTER — HOSPITAL ENCOUNTER (OUTPATIENT)
Dept: CT IMAGING | Facility: CLINIC | Age: 83
Discharge: HOME | End: 2018-04-25
Attending: FAMILY MEDICINE | Admitting: FAMILY MEDICINE
Payer: MEDICARE

## 2018-01-01 ENCOUNTER — HOSPITAL ENCOUNTER (OUTPATIENT)
Dept: CARDIOLOGY | Facility: CLINIC | Age: 83
Discharge: HOME | End: 2018-09-11
Attending: INTERNAL MEDICINE | Admitting: INTERNAL MEDICINE
Payer: MEDICARE

## 2018-01-01 VITALS
WEIGHT: 104.6 LBS | TEMPERATURE: 98.2 F | HEART RATE: 61 BPM | RESPIRATION RATE: 20 BRPM | SYSTOLIC BLOOD PRESSURE: 155 MMHG | BODY MASS INDEX: 17.43 KG/M2 | OXYGEN SATURATION: 100 % | DIASTOLIC BLOOD PRESSURE: 61 MMHG | HEIGHT: 65 IN

## 2018-01-01 VITALS
RESPIRATION RATE: 16 BRPM | HEART RATE: 79 BPM | WEIGHT: 104 LBS | BODY MASS INDEX: 18.43 KG/M2 | HEIGHT: 63 IN | TEMPERATURE: 97.7 F | OXYGEN SATURATION: 96 % | SYSTOLIC BLOOD PRESSURE: 145 MMHG | DIASTOLIC BLOOD PRESSURE: 64 MMHG

## 2018-01-01 VITALS
WEIGHT: 104.4 LBS | RESPIRATION RATE: 16 BRPM | DIASTOLIC BLOOD PRESSURE: 68 MMHG | HEIGHT: 65 IN | SYSTOLIC BLOOD PRESSURE: 146 MMHG | TEMPERATURE: 98.9 F | BODY MASS INDEX: 17.4 KG/M2 | HEART RATE: 59 BPM

## 2018-01-01 VITALS
BODY MASS INDEX: 17.31 KG/M2 | OXYGEN SATURATION: 98 % | DIASTOLIC BLOOD PRESSURE: 69 MMHG | WEIGHT: 104 LBS | HEART RATE: 57 BPM | SYSTOLIC BLOOD PRESSURE: 149 MMHG

## 2018-01-01 VITALS
RESPIRATION RATE: 18 BRPM | OXYGEN SATURATION: 100 % | DIASTOLIC BLOOD PRESSURE: 61 MMHG | BODY MASS INDEX: 17.43 KG/M2 | HEART RATE: 55 BPM | TEMPERATURE: 98.1 F | WEIGHT: 104.63 LBS | SYSTOLIC BLOOD PRESSURE: 141 MMHG | HEIGHT: 65 IN

## 2018-01-01 VITALS
SYSTOLIC BLOOD PRESSURE: 139 MMHG | TEMPERATURE: 98.8 F | WEIGHT: 101.2 LBS | DIASTOLIC BLOOD PRESSURE: 57 MMHG | BODY MASS INDEX: 17.93 KG/M2 | HEART RATE: 61 BPM | RESPIRATION RATE: 16 BRPM | OXYGEN SATURATION: 99 % | HEIGHT: 63 IN

## 2018-01-01 VITALS
WEIGHT: 104.5 LBS | OXYGEN SATURATION: 97 % | TEMPERATURE: 98.2 F | DIASTOLIC BLOOD PRESSURE: 62 MMHG | BODY MASS INDEX: 18.52 KG/M2 | SYSTOLIC BLOOD PRESSURE: 164 MMHG | RESPIRATION RATE: 18 BRPM | HEIGHT: 63 IN

## 2018-01-01 VITALS
SYSTOLIC BLOOD PRESSURE: 142 MMHG | RESPIRATION RATE: 16 BRPM | WEIGHT: 101.2 LBS | BODY MASS INDEX: 17.93 KG/M2 | DIASTOLIC BLOOD PRESSURE: 62 MMHG | TEMPERATURE: 99.3 F | HEART RATE: 72 BPM

## 2018-01-01 DIAGNOSIS — E03.9 HYPOTHYROIDISM, UNSPECIFIED TYPE: Primary | ICD-10-CM

## 2018-01-01 DIAGNOSIS — D47.3 ET (ESSENTIAL THROMBOCYTHEMIA) (H): Primary | ICD-10-CM

## 2018-01-01 DIAGNOSIS — I24.9 ACS (ACUTE CORONARY SYNDROME) (H): ICD-10-CM

## 2018-01-01 DIAGNOSIS — D47.2 MGUS (MONOCLONAL GAMMOPATHY OF UNKNOWN SIGNIFICANCE): ICD-10-CM

## 2018-01-01 DIAGNOSIS — D47.2 MGUS (MONOCLONAL GAMMOPATHY OF UNKNOWN SIGNIFICANCE): Primary | ICD-10-CM

## 2018-01-01 DIAGNOSIS — I49.5 SICK SINUS SYNDROME (H): ICD-10-CM

## 2018-01-01 DIAGNOSIS — R05.9 COUGH: Primary | ICD-10-CM

## 2018-01-01 DIAGNOSIS — R10.31 ABDOMINAL PAIN, RIGHT LOWER QUADRANT: Primary | ICD-10-CM

## 2018-01-01 DIAGNOSIS — D47.3 ET (ESSENTIAL THROMBOCYTHEMIA) (H): ICD-10-CM

## 2018-01-01 DIAGNOSIS — R05.9 COUGH: ICD-10-CM

## 2018-01-01 DIAGNOSIS — E03.9 HYPOTHYROIDISM, UNSPECIFIED TYPE: ICD-10-CM

## 2018-01-01 DIAGNOSIS — R55 NEAR SYNCOPE: ICD-10-CM

## 2018-01-01 DIAGNOSIS — D64.9 ANEMIA, UNSPECIFIED TYPE: ICD-10-CM

## 2018-01-01 DIAGNOSIS — F43.21 GRIEF REACTION: ICD-10-CM

## 2018-01-01 DIAGNOSIS — Z95.0 CARDIAC PACEMAKER IN SITU: ICD-10-CM

## 2018-01-01 DIAGNOSIS — R42 LIGHTHEADEDNESS: ICD-10-CM

## 2018-01-01 DIAGNOSIS — R10.31 ABDOMINAL PAIN, RIGHT LOWER QUADRANT: ICD-10-CM

## 2018-01-01 DIAGNOSIS — I25.10 CORONARY ARTERY DISEASE INVOLVING NATIVE CORONARY ARTERY OF NATIVE HEART WITHOUT ANGINA PECTORIS: Primary | ICD-10-CM

## 2018-01-01 DIAGNOSIS — R00.2 PALPITATIONS: ICD-10-CM

## 2018-01-01 LAB
ALBUMIN SERPL ELPH-MCNC: 4.1 G/DL (ref 3.7–5.1)
ALBUMIN SERPL ELPH-MCNC: 4.1 G/DL (ref 3.7–5.1)
ALBUMIN SERPL-MCNC: 3.5 G/DL (ref 3.4–5)
ALBUMIN SERPL-MCNC: 3.7 G/DL (ref 3.4–5)
ALBUMIN SERPL-MCNC: 3.8 G/DL (ref 3.4–5)
ALP SERPL-CCNC: 45 U/L (ref 40–150)
ALP SERPL-CCNC: 47 U/L (ref 40–150)
ALP SERPL-CCNC: 49 U/L (ref 40–150)
ALPHA1 GLOB SERPL ELPH-MCNC: 0.3 G/DL (ref 0.2–0.4)
ALPHA1 GLOB SERPL ELPH-MCNC: 0.3 G/DL (ref 0.2–0.4)
ALPHA2 GLOB SERPL ELPH-MCNC: 0.7 G/DL (ref 0.5–0.9)
ALPHA2 GLOB SERPL ELPH-MCNC: 0.8 G/DL (ref 0.5–0.9)
ALT SERPL W P-5'-P-CCNC: 19 U/L (ref 0–50)
ALT SERPL W P-5'-P-CCNC: 24 U/L (ref 0–50)
ALT SERPL W P-5'-P-CCNC: 24 U/L (ref 0–50)
ANION GAP SERPL CALCULATED.3IONS-SCNC: 6 MMOL/L (ref 3–14)
ANION GAP SERPL CALCULATED.3IONS-SCNC: 7 MMOL/L (ref 3–14)
ANION GAP SERPL CALCULATED.3IONS-SCNC: 7 MMOL/L (ref 3–14)
AST SERPL W P-5'-P-CCNC: 25 U/L (ref 0–45)
AST SERPL W P-5'-P-CCNC: 28 U/L (ref 0–45)
AST SERPL W P-5'-P-CCNC: 28 U/L (ref 0–45)
B-GLOBULIN SERPL ELPH-MCNC: 0.7 G/DL (ref 0.6–1)
B-GLOBULIN SERPL ELPH-MCNC: 0.8 G/DL (ref 0.6–1)
BASOPHILS # BLD AUTO: 0 10E9/L (ref 0–0.2)
BASOPHILS # BLD AUTO: 0.1 10E9/L (ref 0–0.2)
BASOPHILS NFR BLD AUTO: 0.3 %
BASOPHILS NFR BLD AUTO: 0.3 %
BASOPHILS NFR BLD AUTO: 0.4 %
BASOPHILS NFR BLD AUTO: 0.5 %
BASOPHILS NFR BLD AUTO: 0.6 %
BASOPHILS NFR BLD AUTO: 0.7 %
BILIRUB SERPL-MCNC: 0.3 MG/DL (ref 0.2–1.3)
BILIRUB SERPL-MCNC: 0.3 MG/DL (ref 0.2–1.3)
BILIRUB SERPL-MCNC: 0.5 MG/DL (ref 0.2–1.3)
BUN SERPL-MCNC: 20 MG/DL (ref 7–30)
BUN SERPL-MCNC: 20 MG/DL (ref 7–30)
BUN SERPL-MCNC: 22 MG/DL (ref 7–30)
CALCIUM SERPL-MCNC: 8.8 MG/DL (ref 8.5–10.1)
CALCIUM SERPL-MCNC: 9 MG/DL (ref 8.5–10.1)
CALCIUM SERPL-MCNC: 9.1 MG/DL (ref 8.5–10.1)
CHLORIDE SERPL-SCNC: 101 MMOL/L (ref 94–109)
CHLORIDE SERPL-SCNC: 102 MMOL/L (ref 94–109)
CHLORIDE SERPL-SCNC: 98 MMOL/L (ref 94–109)
CO2 SERPL-SCNC: 26 MMOL/L (ref 20–32)
CO2 SERPL-SCNC: 28 MMOL/L (ref 20–32)
CO2 SERPL-SCNC: 28 MMOL/L (ref 20–32)
CREAT BLD-MCNC: 1.2 MG/DL (ref 0.52–1.04)
CREAT SERPL-MCNC: 0.96 MG/DL (ref 0.52–1.04)
CREAT SERPL-MCNC: 0.97 MG/DL (ref 0.52–1.04)
CREAT SERPL-MCNC: 1.02 MG/DL (ref 0.52–1.04)
DIFFERENTIAL METHOD BLD: ABNORMAL
EOSINOPHIL # BLD AUTO: 0.2 10E9/L (ref 0–0.7)
EOSINOPHIL # BLD AUTO: 0.3 10E9/L (ref 0–0.7)
EOSINOPHIL # BLD AUTO: 0.4 10E9/L (ref 0–0.7)
EOSINOPHIL # BLD AUTO: 0.4 10E9/L (ref 0–0.7)
EOSINOPHIL NFR BLD AUTO: 2 %
EOSINOPHIL NFR BLD AUTO: 2.3 %
EOSINOPHIL NFR BLD AUTO: 2.4 %
EOSINOPHIL NFR BLD AUTO: 2.5 %
EOSINOPHIL NFR BLD AUTO: 3 %
EOSINOPHIL NFR BLD AUTO: 3 %
EOSINOPHIL NFR BLD AUTO: 3.3 %
EOSINOPHIL NFR BLD AUTO: 3.8 %
ERYTHROCYTE [DISTWIDTH] IN BLOOD BY AUTOMATED COUNT: 14.2 % (ref 10–15)
ERYTHROCYTE [DISTWIDTH] IN BLOOD BY AUTOMATED COUNT: 14.5 % (ref 10–15)
ERYTHROCYTE [DISTWIDTH] IN BLOOD BY AUTOMATED COUNT: 14.6 % (ref 10–15)
ERYTHROCYTE [DISTWIDTH] IN BLOOD BY AUTOMATED COUNT: 14.7 % (ref 10–15)
ERYTHROCYTE [DISTWIDTH] IN BLOOD BY AUTOMATED COUNT: 14.7 % (ref 10–15)
ERYTHROCYTE [DISTWIDTH] IN BLOOD BY AUTOMATED COUNT: 15.3 % (ref 10–15)
ERYTHROCYTE [DISTWIDTH] IN BLOOD BY AUTOMATED COUNT: 15.3 % (ref 10–15)
ERYTHROCYTE [DISTWIDTH] IN BLOOD BY AUTOMATED COUNT: 15.4 % (ref 10–15)
FERRITIN SERPL-MCNC: 41 NG/ML (ref 8–252)
FOLATE SERPL-MCNC: 43.1 NG/ML
GAMMA GLOB SERPL ELPH-MCNC: 1.2 G/DL (ref 0.7–1.6)
GAMMA GLOB SERPL ELPH-MCNC: 1.3 G/DL (ref 0.7–1.6)
GFR SERPL CREATININE-BSD FRML MDRD: 42 ML/MIN/1.7M2
GFR SERPL CREATININE-BSD FRML MDRD: 51 ML/MIN/1.7M2
GFR SERPL CREATININE-BSD FRML MDRD: 54 ML/MIN/1.7M2
GFR SERPL CREATININE-BSD FRML MDRD: 55 ML/MIN/1.7M2
GLUCOSE SERPL-MCNC: 101 MG/DL (ref 70–99)
GLUCOSE SERPL-MCNC: 139 MG/DL (ref 70–99)
GLUCOSE SERPL-MCNC: 87 MG/DL (ref 70–99)
HCT VFR BLD AUTO: 33.8 % (ref 35–47)
HCT VFR BLD AUTO: 34.5 % (ref 35–47)
HCT VFR BLD AUTO: 34.7 % (ref 35–47)
HCT VFR BLD AUTO: 35.7 % (ref 35–47)
HCT VFR BLD AUTO: 35.9 % (ref 35–47)
HCT VFR BLD AUTO: 36.2 % (ref 35–47)
HCT VFR BLD AUTO: 36.6 % (ref 35–47)
HCT VFR BLD AUTO: 36.6 % (ref 35–47)
HCT VFR BLD AUTO: 36.7 % (ref 35–47)
HCT VFR BLD AUTO: 37 % (ref 35–47)
HGB BLD-MCNC: 10.8 G/DL (ref 11.7–15.7)
HGB BLD-MCNC: 10.8 G/DL (ref 11.7–15.7)
HGB BLD-MCNC: 11 G/DL (ref 11.7–15.7)
HGB BLD-MCNC: 11.2 G/DL (ref 11.7–15.7)
HGB BLD-MCNC: 11.3 G/DL (ref 11.7–15.7)
HGB BLD-MCNC: 11.6 G/DL (ref 11.7–15.7)
HGB BLD-MCNC: 12 G/DL (ref 11.7–15.7)
IMM GRANULOCYTES # BLD: 0 10E9/L (ref 0–0.4)
IMM GRANULOCYTES # BLD: 0.1 10E9/L (ref 0–0.4)
IMM GRANULOCYTES NFR BLD: 0.5 %
IMM GRANULOCYTES NFR BLD: 0.6 %
IMM GRANULOCYTES NFR BLD: 0.7 %
IMM GRANULOCYTES NFR BLD: 1 %
KAPPA LC UR-MCNC: 1.89 MG/DL (ref 0.33–1.94)
KAPPA LC UR-MCNC: 2.34 MG/DL (ref 0.33–1.94)
KAPPA LC/LAMBDA SER: 0.8 {RATIO} (ref 0.26–1.65)
KAPPA LC/LAMBDA SER: 0.96 {RATIO} (ref 0.26–1.65)
LAMBDA LC SERPL-MCNC: 2.37 MG/DL (ref 0.57–2.63)
LAMBDA LC SERPL-MCNC: 2.45 MG/DL (ref 0.57–2.63)
LYMPHOCYTES # BLD AUTO: 1.9 10E9/L (ref 0.8–5.3)
LYMPHOCYTES # BLD AUTO: 2 10E9/L (ref 0.8–5.3)
LYMPHOCYTES # BLD AUTO: 2 10E9/L (ref 0.8–5.3)
LYMPHOCYTES # BLD AUTO: 2.2 10E9/L (ref 0.8–5.3)
LYMPHOCYTES # BLD AUTO: 2.2 10E9/L (ref 0.8–5.3)
LYMPHOCYTES # BLD AUTO: 2.3 10E9/L (ref 0.8–5.3)
LYMPHOCYTES NFR BLD AUTO: 19.1 %
LYMPHOCYTES NFR BLD AUTO: 19.8 %
LYMPHOCYTES NFR BLD AUTO: 20.4 %
LYMPHOCYTES NFR BLD AUTO: 20.6 %
LYMPHOCYTES NFR BLD AUTO: 21 %
LYMPHOCYTES NFR BLD AUTO: 21.4 %
LYMPHOCYTES NFR BLD AUTO: 21.6 %
LYMPHOCYTES NFR BLD AUTO: 21.7 %
LYMPHOCYTES NFR BLD AUTO: 23.6 %
LYMPHOCYTES NFR BLD AUTO: 24.3 %
M PROTEIN SERPL ELPH-MCNC: 0.2 G/DL
M PROTEIN SERPL ELPH-MCNC: 0.3 G/DL
MAGNESIUM SERPL-MCNC: 2.1 MG/DL (ref 1.6–2.3)
MCH RBC QN AUTO: 28.5 PG (ref 26.5–33)
MCH RBC QN AUTO: 28.9 PG (ref 26.5–33)
MCH RBC QN AUTO: 29 PG (ref 26.5–33)
MCH RBC QN AUTO: 29 PG (ref 26.5–33)
MCH RBC QN AUTO: 29.1 PG (ref 26.5–33)
MCH RBC QN AUTO: 29.1 PG (ref 26.5–33)
MCH RBC QN AUTO: 29.3 PG (ref 26.5–33)
MCH RBC QN AUTO: 29.6 PG (ref 26.5–33)
MCHC RBC AUTO-ENTMCNC: 30.8 G/DL (ref 31.5–36.5)
MCHC RBC AUTO-ENTMCNC: 31.3 G/DL (ref 31.5–36.5)
MCHC RBC AUTO-ENTMCNC: 31.4 G/DL (ref 31.5–36.5)
MCHC RBC AUTO-ENTMCNC: 31.5 G/DL (ref 31.5–36.5)
MCHC RBC AUTO-ENTMCNC: 31.7 G/DL (ref 31.5–36.5)
MCHC RBC AUTO-ENTMCNC: 31.7 G/DL (ref 31.5–36.5)
MCHC RBC AUTO-ENTMCNC: 32 G/DL (ref 31.5–36.5)
MCHC RBC AUTO-ENTMCNC: 32 G/DL (ref 31.5–36.5)
MCHC RBC AUTO-ENTMCNC: 32.3 G/DL (ref 31.5–36.5)
MCHC RBC AUTO-ENTMCNC: 32.7 G/DL (ref 31.5–36.5)
MCV RBC AUTO: 90 FL (ref 78–100)
MCV RBC AUTO: 91 FL (ref 78–100)
MCV RBC AUTO: 92 FL (ref 78–100)
MCV RBC AUTO: 93 FL (ref 78–100)
MCV RBC AUTO: 93 FL (ref 78–100)
MONOCYTES # BLD AUTO: 1 10E9/L (ref 0–1.3)
MONOCYTES # BLD AUTO: 1.1 10E9/L (ref 0–1.3)
MONOCYTES # BLD AUTO: 1.2 10E9/L (ref 0–1.3)
MONOCYTES # BLD AUTO: 1.2 10E9/L (ref 0–1.3)
MONOCYTES NFR BLD AUTO: 10.2 %
MONOCYTES NFR BLD AUTO: 10.3 %
MONOCYTES NFR BLD AUTO: 10.4 %
MONOCYTES NFR BLD AUTO: 10.7 %
MONOCYTES NFR BLD AUTO: 10.8 %
MONOCYTES NFR BLD AUTO: 10.9 %
MONOCYTES NFR BLD AUTO: 11 %
MONOCYTES NFR BLD AUTO: 11.1 %
MONOCYTES NFR BLD AUTO: 11.6 %
MONOCYTES NFR BLD AUTO: 12.7 %
NEUTROPHILS # BLD AUTO: 5.6 10E9/L (ref 1.6–8.3)
NEUTROPHILS # BLD AUTO: 5.6 10E9/L (ref 1.6–8.3)
NEUTROPHILS # BLD AUTO: 5.9 10E9/L (ref 1.6–8.3)
NEUTROPHILS # BLD AUTO: 6.2 10E9/L (ref 1.6–8.3)
NEUTROPHILS # BLD AUTO: 6.3 10E9/L (ref 1.6–8.3)
NEUTROPHILS # BLD AUTO: 6.4 10E9/L (ref 1.6–8.3)
NEUTROPHILS # BLD AUTO: 6.4 10E9/L (ref 1.6–8.3)
NEUTROPHILS # BLD AUTO: 6.5 10E9/L (ref 1.6–8.3)
NEUTROPHILS # BLD AUTO: 6.7 10E9/L (ref 1.6–8.3)
NEUTROPHILS # BLD AUTO: 7 10E9/L (ref 1.6–8.3)
NEUTROPHILS NFR BLD AUTO: 60 %
NEUTROPHILS NFR BLD AUTO: 60.3 %
NEUTROPHILS NFR BLD AUTO: 63.4 %
NEUTROPHILS NFR BLD AUTO: 63.5 %
NEUTROPHILS NFR BLD AUTO: 64.3 %
NEUTROPHILS NFR BLD AUTO: 65.2 %
NEUTROPHILS NFR BLD AUTO: 65.5 %
NEUTROPHILS NFR BLD AUTO: 65.5 %
NEUTROPHILS NFR BLD AUTO: 65.6 %
NEUTROPHILS NFR BLD AUTO: 66.6 %
NRBC # BLD AUTO: 0 10*3/UL
NRBC BLD AUTO-RTO: 0 /100
PLATELET # BLD AUTO: 1019 10E9/L (ref 150–450)
PLATELET # BLD AUTO: 508 10E9/L (ref 150–450)
PLATELET # BLD AUTO: 518 10E9/L (ref 150–450)
PLATELET # BLD AUTO: 678 10E9/L (ref 150–450)
PLATELET # BLD AUTO: 776 10E9/L (ref 150–450)
PLATELET # BLD AUTO: 810 10E9/L (ref 150–450)
PLATELET # BLD AUTO: 818 10E9/L (ref 150–450)
PLATELET # BLD AUTO: 842 10E9/L (ref 150–450)
PLATELET # BLD AUTO: 850 10E9/L (ref 150–450)
PLATELET # BLD AUTO: 975 10E9/L (ref 150–450)
POTASSIUM SERPL-SCNC: 4 MMOL/L (ref 3.4–5.3)
POTASSIUM SERPL-SCNC: 4.1 MMOL/L (ref 3.4–5.3)
POTASSIUM SERPL-SCNC: 4.2 MMOL/L (ref 3.4–5.3)
PROT PATTERN SERPL ELPH-IMP: ABNORMAL
PROT PATTERN SERPL ELPH-IMP: ABNORMAL
PROT SERPL-MCNC: 7.2 G/DL (ref 6.8–8.8)
PROT SERPL-MCNC: 7.5 G/DL (ref 6.8–8.8)
PROT SERPL-MCNC: 7.7 G/DL (ref 6.8–8.8)
RBC # BLD AUTO: 3.71 10E12/L (ref 3.8–5.2)
RBC # BLD AUTO: 3.74 10E12/L (ref 3.8–5.2)
RBC # BLD AUTO: 3.82 10E12/L (ref 3.8–5.2)
RBC # BLD AUTO: 3.86 10E12/L (ref 3.8–5.2)
RBC # BLD AUTO: 3.89 10E12/L (ref 3.8–5.2)
RBC # BLD AUTO: 3.98 10E12/L (ref 3.8–5.2)
RBC # BLD AUTO: 4 10E12/L (ref 3.8–5.2)
RBC # BLD AUTO: 4.01 10E12/L (ref 3.8–5.2)
RBC # BLD AUTO: 4.01 10E12/L (ref 3.8–5.2)
RBC # BLD AUTO: 4.06 10E12/L (ref 3.8–5.2)
SODIUM SERPL-SCNC: 133 MMOL/L (ref 133–144)
SODIUM SERPL-SCNC: 135 MMOL/L (ref 133–144)
SODIUM SERPL-SCNC: 135 MMOL/L (ref 133–144)
TROPONIN I SERPL-MCNC: <0.015 UG/L (ref 0–0.04)
TSH SERPL DL<=0.005 MIU/L-ACNC: 0.42 MU/L (ref 0.4–4)
VIT B12 SERPL-MCNC: 322 PG/ML (ref 193–986)
WBC # BLD AUTO: 10 10E9/L (ref 4–11)
WBC # BLD AUTO: 10.6 10E9/L (ref 4–11)
WBC # BLD AUTO: 10.7 10E9/L (ref 4–11)
WBC # BLD AUTO: 8.9 10E9/L (ref 4–11)
WBC # BLD AUTO: 9.4 10E9/L (ref 4–11)
WBC # BLD AUTO: 9.5 10E9/L (ref 4–11)
WBC # BLD AUTO: 9.6 10E9/L (ref 4–11)
WBC # BLD AUTO: 9.8 10E9/L (ref 4–11)

## 2018-01-01 PROCEDURE — G0378 HOSPITAL OBSERVATION PER HR: HCPCS

## 2018-01-01 PROCEDURE — 71046 X-RAY EXAM CHEST 2 VIEWS: CPT | Mod: FY

## 2018-01-01 PROCEDURE — 80053 COMPREHEN METABOLIC PANEL: CPT | Performed by: STUDENT IN AN ORGANIZED HEALTH CARE EDUCATION/TRAINING PROGRAM

## 2018-01-01 PROCEDURE — 93288 INTERROG EVL PM/LDLS PM IP: CPT

## 2018-01-01 PROCEDURE — 83883 ASSAY NEPHELOMETRY NOT SPEC: CPT | Performed by: INTERNAL MEDICINE

## 2018-01-01 PROCEDURE — 85025 COMPLETE CBC W/AUTO DIFF WBC: CPT | Performed by: INTERNAL MEDICINE

## 2018-01-01 PROCEDURE — G0463 HOSPITAL OUTPT CLINIC VISIT: HCPCS

## 2018-01-01 PROCEDURE — 84484 ASSAY OF TROPONIN QUANT: CPT | Performed by: INTERNAL MEDICINE

## 2018-01-01 PROCEDURE — 83735 ASSAY OF MAGNESIUM: CPT | Performed by: STUDENT IN AN ORGANIZED HEALTH CARE EDUCATION/TRAINING PROGRAM

## 2018-01-01 PROCEDURE — 99236 HOSP IP/OBS SAME DATE HI 85: CPT | Performed by: FAMILY MEDICINE

## 2018-01-01 PROCEDURE — 93293 PM PHONE R-STRIP DEVICE EVAL: CPT

## 2018-01-01 PROCEDURE — 00000402 ZZHCL STATISTIC TOTAL PROTEIN: Performed by: INTERNAL MEDICINE

## 2018-01-01 PROCEDURE — 36415 COLL VENOUS BLD VENIPUNCTURE: CPT | Performed by: INTERNAL MEDICINE

## 2018-01-01 PROCEDURE — 93293 PM PHONE R-STRIP DEVICE EVAL: CPT | Mod: 26 | Performed by: INTERNAL MEDICINE

## 2018-01-01 PROCEDURE — 71046 X-RAY EXAM CHEST 2 VIEWS: CPT

## 2018-01-01 PROCEDURE — 80053 COMPREHEN METABOLIC PANEL: CPT | Performed by: INTERNAL MEDICINE

## 2018-01-01 PROCEDURE — A9270 NON-COVERED ITEM OR SERVICE: HCPCS | Mod: GY | Performed by: STUDENT IN AN ORGANIZED HEALTH CARE EDUCATION/TRAINING PROGRAM

## 2018-01-01 PROCEDURE — 99214 OFFICE O/P EST MOD 30 MIN: CPT | Performed by: INTERNAL MEDICINE

## 2018-01-01 PROCEDURE — 84443 ASSAY THYROID STIM HORMONE: CPT | Performed by: FAMILY MEDICINE

## 2018-01-01 PROCEDURE — 99285 EMERGENCY DEPT VISIT HI MDM: CPT | Mod: 25 | Performed by: STUDENT IN AN ORGANIZED HEALTH CARE EDUCATION/TRAINING PROGRAM

## 2018-01-01 PROCEDURE — 82607 VITAMIN B-12: CPT | Performed by: INTERNAL MEDICINE

## 2018-01-01 PROCEDURE — 84484 ASSAY OF TROPONIN QUANT: CPT | Performed by: STUDENT IN AN ORGANIZED HEALTH CARE EDUCATION/TRAINING PROGRAM

## 2018-01-01 PROCEDURE — 25000132 ZZH RX MED GY IP 250 OP 250 PS 637: Mod: GY | Performed by: STUDENT IN AN ORGANIZED HEALTH CARE EDUCATION/TRAINING PROGRAM

## 2018-01-01 PROCEDURE — 84165 PROTEIN E-PHORESIS SERUM: CPT | Performed by: INTERNAL MEDICINE

## 2018-01-01 PROCEDURE — 99213 OFFICE O/P EST LOW 20 MIN: CPT | Performed by: FAMILY MEDICINE

## 2018-01-01 PROCEDURE — 82565 ASSAY OF CREATININE: CPT

## 2018-01-01 PROCEDURE — 93010 ELECTROCARDIOGRAM REPORT: CPT | Mod: Z6 | Performed by: STUDENT IN AN ORGANIZED HEALTH CARE EDUCATION/TRAINING PROGRAM

## 2018-01-01 PROCEDURE — 85025 COMPLETE CBC W/AUTO DIFF WBC: CPT | Performed by: FAMILY MEDICINE

## 2018-01-01 PROCEDURE — 82746 ASSAY OF FOLIC ACID SERUM: CPT | Performed by: INTERNAL MEDICINE

## 2018-01-01 PROCEDURE — 85025 COMPLETE CBC W/AUTO DIFF WBC: CPT | Performed by: STUDENT IN AN ORGANIZED HEALTH CARE EDUCATION/TRAINING PROGRAM

## 2018-01-01 PROCEDURE — 36415 COLL VENOUS BLD VENIPUNCTURE: CPT | Performed by: FAMILY MEDICINE

## 2018-01-01 PROCEDURE — 93005 ELECTROCARDIOGRAM TRACING: CPT | Performed by: STUDENT IN AN ORGANIZED HEALTH CARE EDUCATION/TRAINING PROGRAM

## 2018-01-01 PROCEDURE — 82728 ASSAY OF FERRITIN: CPT | Performed by: INTERNAL MEDICINE

## 2018-01-01 PROCEDURE — 25000125 ZZHC RX 250: Performed by: RADIOLOGY

## 2018-01-01 PROCEDURE — 74177 CT ABD & PELVIS W/CONTRAST: CPT

## 2018-01-01 PROCEDURE — 99214 OFFICE O/P EST MOD 30 MIN: CPT | Performed by: FAMILY MEDICINE

## 2018-01-01 PROCEDURE — 25000128 H RX IP 250 OP 636: Performed by: RADIOLOGY

## 2018-01-01 RX ORDER — ASPIRIN 81 MG/1
162 TABLET, CHEWABLE ORAL ONCE
Status: COMPLETED | OUTPATIENT
Start: 2018-01-01 | End: 2018-01-01

## 2018-01-01 RX ORDER — LEVOTHYROXINE SODIUM 75 UG/1
TABLET ORAL
Qty: 90 TABLET | Refills: 3 | Status: SHIPPED | OUTPATIENT
Start: 2018-01-01

## 2018-01-01 RX ORDER — ANAGRELIDE HYDROCHLORIDE 0.5 MG/1
CAPSULE ORAL
Qty: 50 CAPSULE | Refills: 3 | Status: SHIPPED | OUTPATIENT
Start: 2018-01-01 | End: 2018-01-01

## 2018-01-01 RX ORDER — LEVOTHYROXINE SODIUM 75 UG/1
TABLET ORAL
Qty: 30 TABLET | Refills: 0 | Status: SHIPPED | OUTPATIENT
Start: 2018-01-01 | End: 2018-01-01

## 2018-01-01 RX ORDER — NALOXONE HYDROCHLORIDE 0.4 MG/ML
.1-.4 INJECTION, SOLUTION INTRAMUSCULAR; INTRAVENOUS; SUBCUTANEOUS
Status: DISCONTINUED | OUTPATIENT
Start: 2018-01-01 | End: 2018-01-01 | Stop reason: HOSPADM

## 2018-01-01 RX ORDER — ANAGRELIDE HYDROCHLORIDE 0.5 MG/1
CAPSULE ORAL
Qty: 50 CAPSULE | Refills: 3 | Status: SHIPPED | OUTPATIENT
Start: 2018-01-01 | End: 2019-01-01

## 2018-01-01 RX ORDER — IOPAMIDOL 755 MG/ML
51 INJECTION, SOLUTION INTRAVASCULAR ONCE
Status: COMPLETED | OUTPATIENT
Start: 2018-01-01 | End: 2018-01-01

## 2018-01-01 RX ORDER — ONDANSETRON 4 MG/1
4 TABLET, ORALLY DISINTEGRATING ORAL EVERY 6 HOURS PRN
Status: DISCONTINUED | OUTPATIENT
Start: 2018-01-01 | End: 2018-01-01 | Stop reason: HOSPADM

## 2018-01-01 RX ORDER — ANAGRELIDE HYDROCHLORIDE 0.5 MG/1
CAPSULE ORAL
Qty: 40 CAPSULE | Refills: 3 | Status: SHIPPED | OUTPATIENT
Start: 2018-01-01 | End: 2018-01-01

## 2018-01-01 RX ORDER — ATENOLOL 25 MG/1
TABLET ORAL
Qty: 90 TABLET | Refills: 1 | Status: SHIPPED | OUTPATIENT
Start: 2018-01-01

## 2018-01-01 RX ORDER — AMOXICILLIN 500 MG/1
500 CAPSULE ORAL 2 TIMES DAILY
Qty: 20 CAPSULE | Refills: 0 | Status: ON HOLD | OUTPATIENT
Start: 2018-01-01 | End: 2018-01-01

## 2018-01-01 RX ORDER — ONDANSETRON 2 MG/ML
4 INJECTION INTRAMUSCULAR; INTRAVENOUS EVERY 6 HOURS PRN
Status: DISCONTINUED | OUTPATIENT
Start: 2018-01-01 | End: 2018-01-01 | Stop reason: HOSPADM

## 2018-01-01 RX ORDER — ATENOLOL 25 MG/1
25 TABLET ORAL DAILY
Qty: 30 TABLET | Refills: 3 | Status: SHIPPED | OUTPATIENT
Start: 2018-01-01 | End: 2018-01-01

## 2018-01-01 RX ORDER — ACETAMINOPHEN 325 MG/1
650 TABLET ORAL EVERY 4 HOURS PRN
Status: DISCONTINUED | OUTPATIENT
Start: 2018-01-01 | End: 2018-01-01 | Stop reason: HOSPADM

## 2018-01-01 RX ADMIN — SODIUM CHLORIDE 53 ML: 9 INJECTION, SOLUTION INTRAVENOUS at 14:53

## 2018-01-01 RX ADMIN — ASPIRIN 81 MG 162 MG: 81 TABLET ORAL at 21:41

## 2018-01-01 RX ADMIN — IOPAMIDOL 51 ML: 755 INJECTION, SOLUTION INTRAVENOUS at 14:53

## 2018-01-01 ASSESSMENT — ACTIVITIES OF DAILY LIVING (ADL)
AMBULATION: 0-->INDEPENDENT
RETIRED_COMMUNICATION: 0-->UNDERSTANDS/COMMUNICATES WITHOUT DIFFICULTY
TRANSFERRING: 0-->INDEPENDENT
BATHING: 0-->INDEPENDENT
EATING: 0 - INDEPENDENT
COMMUNICATION: 0 - UNDERSTANDS/COMMUNICATES WITHOUT DIFFICULTY
FALL_HISTORY_WITHIN_LAST_SIX_MONTHS: NO
RETIRED_EATING: 0-->INDEPENDENT
TOILETING: 0-->INDEPENDENT
BATHING: 0 - INDEPENDENT
DRESS: 0 - INDEPENDENT
TRANSFERRING: 0 - INDEPENDENT
SWALLOWING: 0-->SWALLOWS FOODS/LIQUIDS WITHOUT DIFFICULTY
TOILETING: 0 - INDEPENDENT
AMBULATION: 0 - INDEPENDENT
DRESS: 0-->INDEPENDENT
COGNITION: 0 - NO COGNITION ISSUES REPORTED
CHANGE_IN_FUNCTIONAL_STATUS_SINCE_ONSET_OF_CURRENT_ILLNESS/INJURY: NO

## 2018-01-01 ASSESSMENT — PAIN SCALES - GENERAL
PAINLEVEL: NO PAIN (0)

## 2018-02-07 ENCOUNTER — TELEPHONE (OUTPATIENT)
Dept: FAMILY MEDICINE | Facility: CLINIC | Age: 83
End: 2018-02-07

## 2018-02-07 DIAGNOSIS — I24.9 ACS (ACUTE CORONARY SYNDROME) (H): ICD-10-CM

## 2018-02-07 DIAGNOSIS — D47.3 ET (ESSENTIAL THROMBOCYTHEMIA) (H): ICD-10-CM

## 2018-02-07 RX ORDER — ATENOLOL 25 MG/1
TABLET ORAL
Qty: 30 TABLET | Refills: 0 | Status: SHIPPED | OUTPATIENT
Start: 2018-02-07 | End: 2018-01-01

## 2018-02-07 RX ORDER — ANAGRELIDE HYDROCHLORIDE 0.5 MG/1
CAPSULE ORAL
Qty: 60 CAPSULE | Refills: 1 | Status: SHIPPED | OUTPATIENT
Start: 2018-02-07 | End: 2018-01-01

## 2018-02-07 NOTE — TELEPHONE ENCOUNTER
[facility] requesting refill of Atenolol 25 mg tab.  SIG: One tablet by mouth once daily.  Last filled 1/8/18 #30  This medication is not on her current medication list.

## 2018-02-07 NOTE — PROGRESS NOTES
Fax received from [facility] in [place] requesting a refill of Agrylin on behalf of pt.  Last refill: 12/12/2017  # 60 with 1 refills at [facility].  Last office visit:  12/12/2017  Next office visit:  3/12/2018    This is an appropriate refill, and has been e-prescribed. Violeta Soliz RN, BSN, OCN

## 2018-03-01 NOTE — TELEPHONE ENCOUNTER
"Requested Prescriptions   Pending Prescriptions Disp Refills     levothyroxine (SYNTHROID/LEVOTHROID) 75 MCG tablet [Pharmacy Med Name: LEVOTHYROXIN 75 MCG TAB SAND] 30 tablet 2     Sig: TAKE ONE TABLET BY MOUTH ONE TIME DAILY    Thyroid Protocol Failed    3/1/2018  1:41 PM       Failed - Normal TSH on file in past 12 months    Recent Labs   Lab Test  03/01/17   1306   TSH  0.34*             Passed - Patient is 12 years or older       Passed - Recent or future visit with authorizing provider's specialty    Patient had office visit in the last year or has a visit in the next 30 days with authorizing provider.  See \"Patient Info\" tab in inbasket, or \"Choose Columns\" in Meds & Orders section of the refill encounter.            Passed - No active pregnancy on record    If patient is pregnant or has had a positive pregnancy test, please check TSH.         Passed - No positive pregnancy test in past 12 months    If patient is pregnant or has had a positive pregnancy test, please check TSH.          Last Written Prescription Date:  3/10/2017  Last Fill Quantity: 90,  # refills: 3   Last office visit: 9/13/2017 with prescribing provider:  Chace   Future Office Visit:   Next 5 appointments (look out 90 days)     Mar 12, 2018 11:30 AM CDT   Return Visit with Annabel Pelaez MD   [facility] ([facility])    [facility]  [address] [facility] [address]  [facility] [address]   [phone]                   "

## 2018-03-07 NOTE — PROGRESS NOTES
~~90 DAY PPM PHONE TELETRACE~~Normal pacemaker function. Appropriate AP/VS (60 bpm) at time of check. 6 sec strips of pre-magnet, magnet and post-magnet were saved. Magnet response WNL. F/U scheduled q 3 months. Pt denies any complaints regarding her device.

## 2018-03-07 NOTE — LETTER
March 9, 2018      Maggie Lezamari  [address] [address]        Dear ,    We are writing to inform you of your test results.    Your thyroid test was normal.    Resulted Orders   TSH   Result Value Ref Range    TSH 0.42 0.40 - 4.00 mU/L       If you have any questions or concerns, please call the clinic at the number listed above.       Sincerely,    Dorian Jensen MD/ ss

## 2018-03-12 NOTE — MR AVS SNAPSHOT
After Visit Summary   3/12/2018    Maggie Baires    MRN: [id number]           Patient Information     Date Of Birth          1/10/1928        Visit Information        Provider Department      3/12/2018 11:30 AM Annabel Pelaez MD [facility]        Today's Diagnoses     ET (essential thrombocythemia) (H)    -  1    MGUS (monoclonal gammopathy of unknown significance)        Anemia, unspecified type          Care Instructions    4 months f/u with labs.           Follow-ups after your visit        Your next 10 appointments already scheduled     Apr 13, 2018 10:30 AM CDT   Return Visit with Yaron Arango MD   [facility] ([facility])    [address] [address]   [phone]            Jun 06, 2018  2:30 PM CDT   Phone Device Check with [place] [facility]   [facility] ([facility])    [address] [address]   [phone]            Jul 05, 2018 11:00 AM CDT   LAB with [facility]   [facility] ([facility])    [address] [place] [address]   [phone]           Please do not eat 10-12 hours before your appointment if you are coming in fasting for labs on lipids, cholesterol, or glucose (sugar). This does not apply to pregnant women. Water, hot tea and black coffee (with nothing added) are okay. Do not drink other fluids, diet soda or chew gum.            Jul 09, 2018 11:30 AM CDT   Return Visit with Annabel Pelaez MD   [facility] ([facility])    [facility]  [address] [place] [address] [address]  [facility] [address]   [phone]              Future tests that were ordered for you today     Open Future Orders        Priority Expected Expires Ordered    Kappa and lambda light chain Routine 7/1/2018 8/31/2018 3/12/2018    Protein electrophoresis Routine 7/1/2018 8/31/2018 3/12/2018    Ferritin  "Routine 2018 2018 3/12/2018    Vitamin B12 Routine 2018 2018 3/12/2018    Folate Routine 2018 2018 3/12/2018    Comprehensive metabolic panel Routine 2018 2018 3/12/2018    CBC with platelets differential Routine 2018 2018 3/12/2018            Who to contact     If you have questions or need follow up information about today's clinic visit or your schedule please contact [facility] directly at [phone].  Normal or non-critical lab and imaging results will be communicated to you by MyChart, letter or phone within 4 business days after the clinic has received the results. If you do not hear from us within 7 days, please contact the clinic through Octrohart or phone. If you have a critical or abnormal lab result, we will notify you by phone as soon as possible.  Submit refill requests through Precog or call your pharmacy and they will forward the refill request to us. Please allow 3 business days for your refill to be completed.          Additional Information About Your Visit        MyChart Information     Precog lets you send messages to your doctor, view your test results, renew your prescriptions, schedule appointments and more. To sign up, go to [email or web address] . Click on \"Log in\" on the left side of the screen, which will take you to the Welcome page. Then click on \"Sign up Now\" on the right side of the page.     You will be asked to enter the access code listed below, as well as some personal information. Please follow the directions to create your username and password.     Your access code is: [email or web address]  Expires: 3/12/2018  4:10 PM     Your access code will  in 90 days. If you need help or a new code, please call your [facility] or [phone].        Care EveryWhere ID     This is your Care EveryWhere ID. This could be used by other organizations to access your [facility] medical records  [id number]        Your Vitals Were     " "Pulse Temperature Respirations Height Pulse Oximetry Breastfeeding?    61 98.2  F (36.8  C) (Tympanic) 20 1.657 m (5' 5.24\") 100% No    BMI (Body Mass Index)                   17.28 kg/m2            Blood Pressure from Last 3 Encounters:   03/12/18 155/61   12/12/17 138/56   08/14/17 138/68    Weight from Last 3 Encounters:   03/12/18 47.4 kg (104 lb 9.6 oz)   12/12/17 47.5 kg (104 lb 11.2 oz)   08/14/17 48.4 kg (106 lb 12.8 oz)                 Today's Medication Changes          These changes are accurate as of 3/12/18 11:48 AM.  If you have any questions, ask your nurse or doctor.               These medicines have changed or have updated prescriptions.        Dose/Directions    AGRYLIN 0.5 MG capsule   This may have changed:  additional instructions   Used for:  ET (essential thrombocythemia) (H)   Generic drug:  anagrelide   Changed by:  Annabel Pelaez MD        Take 1 capsule 0.5 mg po qd Mon-Friday, 2 capsules (1 mg) po qd Sat, Sun   Quantity:  40 capsule   Refills:  3            Where to get your medicines      These medications were sent to [facility] #[facility] [address] [address]    Hours:  Closed 10-16-08 business to [facility] Phone:  [phone]     AGRYLIN 0.5 MG capsule                Primary Care Provider Office Phone # Fax #    Dorian Jensen [phone]       [address] [place] [address]        Equal Access to Services     [facility]: Hadhernan Ferrer, carolina whyte, qaybnicole kaalliam gallo. So [facility] [phone].    ATENCIÓN: Si habla español, tiene a singleton disposición servicios gratuitos de asistencia lingüística. Whitney al [phone].    We comply with applicable federal civil rights laws and [place] laws. We do not discriminate on the basis of race, color, national origin, age, disability, sex, sexual orientation, or gender " identity.            Thank you!     Thank you for choosing [facility]  for your care. Our goal is always to provide you with excellent care. Hearing back from our patients is one way we can continue to improve our services. Please take a few minutes to complete the written survey that you may receive in the mail after your visit with us. Thank you!             Your Updated Medication List - Protect others around you: Learn how to safely use, store and throw away your medicines at www.disposemymeds.org.          This list is accurate as of 3/12/18 11:48 AM.  Always use your most recent med list.                   Brand Name Dispense Instructions for use Diagnosis    AGRYLIN 0.5 MG capsule   Generic drug:  anagrelide     40 capsule    Take 1 capsule 0.5 mg po qd Mon-Friday, 2 capsules (1 mg) po qd Tesha Gillette    ET (essential thrombocythemia) (H)       aspirin 81 MG tablet     30 tablet    Take by mouth daily        atenolol 25 MG tablet    TENORMIN    30 tablet    n TAKE 1 TABLET (25 MG) BY MOUTH DAILY NEEDS BP RECHECK FOR FURTHER REFILLS    ACS (acute coronary syndrome) (H)       hypromellose 0.5 % Soln ophthalmic solution    ARTIFICIAL TEARS     Place 1 drop into both eyes 4 times daily as needed        levothyroxine 75 MCG tablet    SYNTHROID/LEVOTHROID    30 tablet    TAKE ONE TABLET BY MOUTH ONE TIME DAILY    Hypothyroidism, unspecified type       metoprolol tartrate 25 MG tablet    LOPRESSOR    180 tablet    Take 1 tablet (25 mg) by mouth 2 times daily    ACS (acute coronary syndrome) (H)       VITAMIN D (CHOLECALCIFEROL) PO      Take 2,000 Units by mouth daily

## 2018-03-12 NOTE — PROGRESS NOTES
"CHIEF COMPLAINT AND REASON FOR VISIT:   ET on anagrelide,   IgG kappa M protein     HISTORY OF PRESENT ILLNESS: She was diagnosed with essential thrombocytosis since 2004. She has been getting anagrelide treatment for years and her platelet count has been fluctuating between 1.3 million to around 500. She tolerates anagrelide fair. She had extreme thrombocytosis.   She was on anagrelide 1.5 mg a day, 2 of the 0.5 mg tablets in the morning, 0.5 mg tabletsin the evening, this was started mid 10/20/2009. The dose was adjusted prior to that. She has no thrombosis history. Then dose of anagrelide is 1 mg p.o. q.a.m. and 0.5 mg p.o. q.p.m. Monday through Friday and 0.5 mg p.o. bid Saturday and Sunday.   The dose was again reduced in 10/2013 due to dropping PL so she is on 0.5 mg po bid. She refuses to change to other med since it has been working well for her. She starts to have anemia in 2016. Does has been adjusted due to anemia and thrombocytosis.    Also accidental  M IgG kappa protein 0.3 gram in 2009, has MGUS and is on follow up.     OTHER MEDICAL PROBLEMS: Hypothyroidism, osteoarthritis, ET, cardiac dysrhythmia (SSS), pacemaker years ago, cataract. P. HTN. CAD in 11/2013, heart attack 8/2014    MEDICATIONS: Reviewed in Epic system.     ALLERGIES: Hydrea had extensive skin reaction.      SOCIAL HISTORY: Retired, very active lady. Lives in [place].  She lives in an apartment. She is . Her daughter is close by.     REVIEW OF SYSTEMS:   She is in her usual state of health.   she is faithfully taking anagrelide.   She denied any bleeding episode, any thrombosis episode, any paresthesia of her fingertips and toes, any bone pain, constipation.   She volunteers in the community.     PHYSICAL EXAMINATION:   VITAL SIGNS: Blood pressure 155/61, pulse 61, temperature 98.2  F (36.8  C), temperature source Tympanic, resp. rate 20, height 1.657 m (5' 5.24\"), weight 47.4 kg (104 lb 9.6 oz), SpO2 100 %, not " currently breastfeeding.  GENERAL APPEARANCE: Elderly lady, looks much younger than her stated age, not in acute distress. She always looks very thin but healthy.   EXTREMITIES: Multiple varicose veins and joints deformity.  HEENT: The patient is normocephalic, atraumatic. Pupils are equal react to light. Sclerae are anicteric. Moist oral mucosa. Negative pharynx. No oral thrush.   NECK: Supple. No jugular venous distention. Thyroid is not palpable.   LYMPH NODES: Superficial lymphadenopathy is not appreciable in the bilateral cervical, supraclavicular, axillary or inguinal adenopathy.   CARDIOVASCULAR: S1, S2 regular with no murmurs or gallops. No carotid or abdominal bruits. Left upper CW pacemaker.   PULMONARY: Lungs are clear to auscultation and percussion bilaterally. There is no wheezing or rhonchi.   GASTROINTESTINAL: Abdomen is soft, nontender. No hepatosplenomegaly. No signs of ascites. No mass appreciable.   NEUROLOGIC: Cranial nerves II-XII are grossly intact. Sensation intact. Muscle strength and muscle tone symmetrical all through 5/5.   BACK: No spinal or paraspinal tenderness. No CVA tenderness.   SKIN: bulging erythematous rash on nasol upper bridge.    CURRENT LAB DATA REVIEWED  Hb 10.8 from  11.4, wbc/diff nl, + from  900+ from 700-800  CMP is fine.  spep 0.3 in 3/2018 from 0.2 from 0.3   LCA nl in 3/2018    Current Image Reviewed  8/2017 - nl liver and spleen size.     OLD DATA REVIEW IN SUMMARY:  Serum protein electrophoresis 0.3 in 4/2016, in 10/2015 stabilized 0.2 gm stable     Immunofixation indicating 0.3 gm IgG kappa in 10/2009. She was thrombocytopenic in the later part of 2009, platelets between 100 and 130 when anagrelide dose was adjusted.     ASSESSMENT AND PLAN:   1. Essential thrombocythemia (ET) since 2004. She has been anagrelide for yrs, dose has been adjusted due to anemia and thrombocytosis.   She is advised to try 0.5 g Mon-Fri and 1 g on S/S.   She understand ET is one  form of myeloproliferative disease and she needs to be watched for transforming into leukemia and she needs to be on this platelet agent for the rest of her life and her disease may change. She needs to see hematology for the rest of her life.     2. Monoclonal gammopathy of unknown significance (MGUS).   Monitor her for end organ function and at this point.    3.normacytic anemia new in 2016. Likely anagrelide side effects.   We discussed the proper diet. Will check her nutrition status.

## 2018-03-12 NOTE — PATIENT INSTRUCTIONS
We would like to see you back in 4 months for a follow up appointment with labs prior. When you are in need of a refill, please call your pharmacy and they will send us a request.  Copy of appointments, and after visit summary (AVS) given to patient.  If you have any questions please call Violeta Soliz RN, BSN Oncology Hematology  [facility] [phone]. For questions after business hours, or on holidays/weekends, please call our after hours Nurse Triage line [phone]. Thank you.           4 months f/u with labs.

## 2018-03-12 NOTE — NURSING NOTE
"Oncology Rooming Note    March 12, 2018 11:22 AM   Maggie Baires is a 89 year old female who presents for:    Chief Complaint   Patient presents with     Hematology     3 month recehck Anemia, review Labs     Initial Vitals: /61 (BP Location: Right arm, Patient Position: Sitting, Cuff Size: Adult Regular)  Pulse 61  Temp 98.2  F (36.8  C) (Tympanic)  Resp 20  Ht 1.657 m (5' 5.24\")  Wt 47.4 kg (104 lb 9.6 oz)  SpO2 100%  Breastfeeding? No  BMI 17.28 kg/m2 Estimated body mass index is 17.28 kg/(m^2) as calculated from the following:    Height as of this encounter: 1.657 m (5' 5.24\").    Weight as of this encounter: 47.4 kg (104 lb 9.6 oz). Body surface area is 1.48 meters squared.  No Pain (0) Comment: Data Unavailable   No LMP recorded. Patient is postmenopausal.  Allergies reviewed: Yes  Medications reviewed: Yes    Medications: Medication refills not needed today.  Pharmacy name entered into Sellbox: [id number] [address] [facility]    Clinical concerns:3 month recehck MGUS, review Labs.     7 minutes for nursing intake (face to face time)     Bebe Newsome CMA              "

## 2018-03-12 NOTE — LETTER
3/12/2018         RE: Maggie Baires  [address]   [place] [address]        Dear Colleague,    Thank you for referring your patient, Maggie Baires, to the [facility]. Please see a copy of my visit note below.    CHIEF COMPLAINT AND REASON FOR VISIT:   ET on anagrelide,   IgG kappa M protein     HISTORY OF PRESENT ILLNESS: She was diagnosed with essential thrombocytosis since 2004. She has been getting anagrelide treatment for years and her platelet count has been fluctuating between 1.3 million to around 500. She tolerates anagrelide fair. She had extreme thrombocytosis.   She was on anagrelide 1.5 mg a day, 2 of the 0.5 mg tablets in the morning, 0.5 mg tabletsin the evening, this was started mid 10/20/2009. The dose was adjusted prior to that. She has no thrombosis history. Then dose of anagrelide is 1 mg p.o. q.a.m. and 0.5 mg p.o. q.p.m. Monday through Friday and 0.5 mg p.o. bid Saturday and Sunday.   The dose was again reduced in 10/2013 due to dropping PL so she is on 0.5 mg po bid. She refuses to change to other med since it has been working well for her. She starts to have anemia in 2016. Does has been adjusted due to anemia and thrombocytosis.    Also accidental  M IgG kappa protein 0.3 gram in 2009, has MGUS and is on follow up.     OTHER MEDICAL PROBLEMS: Hypothyroidism, osteoarthritis, ET, cardiac dysrhythmia (SSS), pacemaker years ago, cataract. P. HTN. CAD in 11/2013, heart attack 8/2014    MEDICATIONS: Reviewed in Epic system.     ALLERGIES: Hydrea had extensive skin reaction.      SOCIAL HISTORY: Retired, very active lady. Lives in [place].  She lives in an apartment. She is . Her daughter is close by.     REVIEW OF SYSTEMS:   She is in her usual state of health.   she is faithfully taking anagrelide.   She denied any bleeding episode, any thrombosis episode, any paresthesia of her fingertips and toes, any bone pain, constipation.   She volunteers in  "the community.     PHYSICAL EXAMINATION:   VITAL SIGNS: Blood pressure 155/61, pulse 61, temperature 98.2  F (36.8  C), temperature source Tympanic, resp. rate 20, height 1.657 m (5' 5.24\"), weight 47.4 kg (104 lb 9.6 oz), SpO2 100 %, not currently breastfeeding.  GENERAL APPEARANCE: Elderly lady, looks much younger than her stated age, not in acute distress. She always looks very thin but healthy.   EXTREMITIES: Multiple varicose veins and joints deformity.  HEENT: The patient is normocephalic, atraumatic. Pupils are equal react to light. Sclerae are anicteric. Moist oral mucosa. Negative pharynx. No oral thrush.   NECK: Supple. No jugular venous distention. Thyroid is not palpable.   LYMPH NODES: Superficial lymphadenopathy is not appreciable in the bilateral cervical, supraclavicular, axillary or inguinal adenopathy.   CARDIOVASCULAR: S1, S2 regular with no murmurs or gallops. No carotid or abdominal bruits. Left upper CW pacemaker.   PULMONARY: Lungs are clear to auscultation and percussion bilaterally. There is no wheezing or rhonchi.   GASTROINTESTINAL: Abdomen is soft, nontender. No hepatosplenomegaly. No signs of ascites. No mass appreciable.   NEUROLOGIC: Cranial nerves II-XII are grossly intact. Sensation intact. Muscle strength and muscle tone symmetrical all through 5/5.   BACK: No spinal or paraspinal tenderness. No CVA tenderness.   SKIN: bulging erythematous rash on nasol upper bridge.    CURRENT LAB DATA REVIEWED  Hb 10.8 from  11.4, wbc/diff nl, + from  900+ from 700-800  CMP is fine.  spep 0.3 in 3/2018 from 0.2 from 0.3   LCA nl in 3/2018    Current Image Reviewed  8/2017 - nl liver and spleen size.     OLD DATA REVIEW IN SUMMARY:  Serum protein electrophoresis 0.3 in 4/2016, in 10/2015 stabilized 0.2 gm stable     Immunofixation indicating 0.3 gm IgG kappa in 10/2009. She was thrombocytopenic in the later part of 2009, platelets between 100 and 130 when anagrelide dose was adjusted. "     ASSESSMENT AND PLAN:   1. Essential thrombocythemia (ET) since 2004. She has been anagrelide for yrs, dose has been adjusted due to anemia and thrombocytosis.   She is advised to try 0.5 g Mon-Fri and 1 g on S/S.   She understand ET is one form of myeloproliferative disease and she needs to be watched for transforming into leukemia and she needs to be on this platelet agent for the rest of her life and her disease may change. She needs to see hematology for the rest of her life.     2. Monoclonal gammopathy of unknown significance (MGUS).   Monitor her for end organ function and at this point.    3.normacytic anemia new in 2016. Likely anagrelide side effects.   We discussed the proper diet. Will check her nutrition status.     Again, thank you for allowing me to participate in the care of your patient.        Sincerely,        Annabel Pelaez MD, MD

## 2018-03-30 NOTE — TELEPHONE ENCOUNTER
"Requested Prescriptions   Pending Prescriptions Disp Refills     levothyroxine (SYNTHROID/LEVOTHROID) 75 MCG tablet [Pharmacy Med Name: LEVOTHYROXIN 75 MCG TAB SAND] 30 tablet 0     Sig: TAKE ONE TABLET BY MOUTH ONE TIME DAILY    Thyroid Protocol Passed    3/30/2018  1:01 PM       Passed - Patient is 12 years or older       Passed - Recent (12 mo) or future (30 days) visit within the authorizing provider's specialty    Patient had office visit in the last 12 months or has a visit in the next 30 days with authorizing provider or within the authorizing provider's specialty.  See \"Patient Info\" tab in inbasket, or \"Choose Columns\" in Meds & Orders section of the refill encounter.           Passed - Normal TSH on file in past 12 months    Recent Labs   Lab Test  03/07/18   1023   TSH  0.42             Passed - No active pregnancy on record    If patient is pregnant or has had a positive pregnancy test, please check TSH.         Passed - No positive pregnancy test in past 12 months    If patient is pregnant or has had a positive pregnancy test, please check TSH.          levothyroxine (SYNTHROID/LEVOTHROID) 75 MCG tablet  Last Written Prescription Date:  03/01/2018  Last Fill Quantity: 30 tablet,  # refills: 0   Last office visit: 9/13/2017 with prescribing provider:  05/22/2017 MANE Mas   Future Office Visit:   Next 5 appointments (look out 90 days)     Apr 13, 2018 10:30 AM CDT   Return Visit with Yaron Arango MD   [facility] ([facility])    [address] [place] [address]   [phone]                 Pallavi SHANE (R) (M)    "

## 2018-04-05 NOTE — MR AVS SNAPSHOT
After Visit Summary   4/5/2018    Maggie Baires    MRN: [id number]           Patient Information     Date Of Birth          1/10/1928        Visit Information        Provider Department      4/5/2018 10:20 AM Dorian Jensen MD [facility]        Today's Diagnoses     Cough    -  1      Care Instructions    1.  You have a little chest congestion today    2. Use the antibiotic for ten days    3.  Come back if not better.           Follow-ups after your visit        Your next 10 appointments already scheduled     Apr 13, 2018 10:30 AM CDT   Return Visit with Yaron Arango MD   [facility] ([facility])    [address] [facility] [address]   [phone]            Jun 06, 2018  2:30 PM CDT   Phone Device Check with [place] [facility]   [facility] ([facility])    [address] [facility] [address]   [phone]            Jul 05, 2018 11:00 AM CDT   LAB with [facility]   [facility] ([facility])    [address] [place] [address]   [phone]           Please do not eat 10-12 hours before your appointment if you are coming in fasting for labs on lipids, cholesterol, or glucose (sugar). This does not apply to pregnant women. Water, hot tea and black coffee (with nothing added) are okay. Do not drink other fluids, diet soda or chew gum.            Jul 09, 2018 11:30 AM CDT   Return Visit with Annabel Pelaez MD   [facility] ([facility])    [facility] [address]  [facility] [address]   [phone]              Who to contact     If you have questions or need follow up information about today's clinic visit or your schedule please contact [facility] directly at [phone].  Normal or non-critical lab and imaging results will be communicated to you by  "MyChart, letter or phone within 4 business days after the clinic has received the results. If you do not hear from us within 7 days, please contact the clinic through [email or web address] or phone. If you have a critical or abnormal lab result, we will notify you by phone as soon as possible.  Submit refill requests through [employer] or call your pharmacy and they will forward the refill request to us. Please allow 3 business days for your refill to be completed.          Additional Information About Your Visit        [email or web address] Information     [employer] lets you send messages to your doctor, view your test results, renew your prescriptions, schedule appointments and more. To sign up, go to [email or web address] . Click on \"Log in\" on the left side of the screen, which will take you to the Welcome page. Then click on \"Sign up Now\" on the right side of the page.     You will be asked to enter the access code listed below, as well as some personal information. Please follow the directions to create your username and password.     Your access code is: [email or web address]  Expires: 2018 10:58 AM     Your access code will  in 90 days. If you need help or a new code, please call your [facility] clinic or [phone].        Care EveryWhere ID     This is your Care EveryWhere ID. This could be used by other organizations to access your [facility] medical records  [id number]        Your Vitals Were     Pulse Temperature Respirations Height Pulse Oximetry Breastfeeding?    55 98.1  F (36.7  C) (Tympanic) 18 5' 5\" (1.651 m) 100% No    BMI (Body Mass Index)                   17.41 kg/m2            Blood Pressure from Last 3 Encounters:   18 141/61   18 155/61   17 138/56    Weight from Last 3 Encounters:   18 104 lb 10 oz (47.5 kg)   18 104 lb 9.6 oz (47.4 kg)   17 104 lb 11.2 oz (47.5 kg)                 Today's Medication Changes          These changes are accurate as of 18 10:58 AM.  If you have any " questions, ask your nurse or doctor.               Start taking these medicines.        Dose/Directions    amoxicillin 500 MG capsule   Commonly known as:  AMOXIL   Used for:  Cough   Started by:  Dorian Jensen MD        Dose:  500 mg   Take 1 capsule (500 mg) by mouth 2 times daily   Quantity:  20 capsule   Refills:  0            Where to get your medicines      These medications were sent to [id number] - [place], [place] - [address]  [address] [address]    Hours:  Closed 10-16-08 business to [facility] Phone:  [phone]     amoxicillin 500 MG capsule                Primary Care Provider Office Phone # Fax #    Dorian Jensen [phone]       [address] [place] [address]        Equal Access to Services     SAMIA REYES : Alonso hood hadasho Soomaali, waaxda luqadaha, qaybta kaalmada adeegyada, liam kirby . So [facility] [phone].    ATENCIÓN: Si habla español, tiene a singleton disposición servicios gratuitos de asistencia lingüística. [facility] [phone].    We comply with applicable federal civil rights laws and [place] laws. We do not discriminate on the basis of race, color, national origin, age, disability, sex, sexual orientation, or gender identity.            Thank you!     Thank you for choosing [facility]  for your care. Our goal is always to provide you with excellent care. Hearing back from our patients is one way we can continue to improve our services. Please take a few minutes to complete the written survey that you may receive in the mail after your visit with us. Thank you!             Your Updated Medication List - Protect others around you: Learn how to safely use, store and throw away your medicines at www.disposemymeds.org.          This list is accurate as of 4/5/18 10:58 AM.  Always use your most recent med list.                   Brand Name  Dispense Instructions for use Diagnosis    AGRYLIN 0.5 MG capsule   Generic drug:  anagrelide     40 capsule    Take 1 capsule 0.5 mg po qd Mon-Friday, 2 capsules (1 mg) po qd Tesha [place]    ET (essential thrombocythemia) (H)       amoxicillin 500 MG capsule    AMOXIL    20 capsule    Take 1 capsule (500 mg) by mouth 2 times daily    Cough       aspirin 81 MG tablet     30 tablet    Take by mouth daily        atenolol 25 MG tablet    TENORMIN    30 tablet    n TAKE 1 TABLET (25 MG) BY MOUTH DAILY NEEDS BP RECHECK FOR FURTHER REFILLS    ACS (acute coronary syndrome) (H)       hypromellose 0.5 % Soln ophthalmic solution    ARTIFICIAL TEARS     Place 1 drop into both eyes 4 times daily as needed        levothyroxine 75 MCG tablet    SYNTHROID/LEVOTHROID    30 tablet    TAKE ONE TABLET BY MOUTH ONE TIME DAILY    Hypothyroidism, unspecified type       metoprolol tartrate 25 MG tablet    LOPRESSOR    180 tablet    Take 1 tablet (25 mg) by mouth 2 times daily    ACS (acute coronary syndrome) (H)       VITAMIN D (CHOLECALCIFEROL) PO      Take 2,000 Units by mouth daily

## 2018-04-05 NOTE — NURSING NOTE
"Chief Complaint   Patient presents with     URI       Initial /61 (Cuff Size: Adult Regular)  Pulse 55  Temp 98.1  F (36.7  C) (Tympanic)  Resp 18  Ht 5' 5\" (1.651 m)  Wt 104 lb 10 oz (47.5 kg)  SpO2 100%  Breastfeeding? No  BMI 17.41 kg/m2 Estimated body mass index is 17.41 kg/(m^2) as calculated from the following:    Height as of this encounter: 5' 5\" (1.651 m).    Weight as of this encounter: 104 lb 10 oz (47.5 kg).      Health Maintenance that is potentially due pending provider review:  NONE    n/a    Is there anyone who you would like to be able to receive your results? Not Applicable  If yes have patient fill out DILAN    "

## 2018-04-05 NOTE — PATIENT INSTRUCTIONS
1.  You have a little chest congestion today    2. Use the antibiotic for ten days    3.  Come back if not better.

## 2018-04-05 NOTE — PROGRESS NOTES
SUBJECTIVE:   Maggie Baires is a 89 year old female who presents to clinic today for the following health issues:      RESPIRATORY SYMPTOMS   She is coughing and now has hoarse.       Duration: ongoing for about 2 months     Description  rhinorrhea, fatigue/malaise and hoarse voice    Severity: moderate    Accompanying signs and symptoms: will lose her voice will come back and then is scratchy. Was coughing up mucous- that's gotten better.  Thinks she had the flu awhile back and just can't get better     History (predisposing factors):  none    Precipitating or alleviating factors: None    Therapies tried and outcome:  rest and fluids, warm salt water, cough medicine, honey           Problem list and histories reviewed & adjusted, as indicated.  Additional history: as documented    Patient Active Problem List   Diagnosis     Other specified cardiac dysrhythmias(427.89)     Hypothyroidism     Generalized osteoarthrosis, unspecified site     Vitamin D deficiency     ET (essential thrombocythemia) (H)     Advanced directives, counseling/discussion     ACS (acute coronary syndrome) (H)     Hyperlipidemia LDL goal <70     Primary pulmonary hypertension (H)     Past Surgical History:   Procedure Laterality Date     CARDIAC CATHERIZATION  11/1/13    NSTEMI     COLONOSCOPY  12-19-95    Normal Colonosocpy with Normal Barium enema     SURGICAL HISTORY OF -       left cataract     SURGICAL HISTORY OF -       hemorrhoids     SURGICAL HISTORY OF -       left cataract     SURGICAL HISTORY OF -       right cataract     SURGICAL HISTORY OF -       hernia       Social History   Substance Use Topics     Smoking status: Former Smoker     Quit date: 12/28/1965     Smokeless tobacco: Never Used     Alcohol use No     Family History   Problem Relation Age of Onset     CANCER Mother      uterine?     CANCER Brother      throat     CANCER Sister      in her 70s, unknown type     DIABETES Sister          Current Outpatient Prescriptions  "  Medication Sig Dispense Refill     levothyroxine (SYNTHROID/LEVOTHROID) 75 MCG tablet TAKE ONE TABLET BY MOUTH ONE TIME DAILY 30 tablet 0     AGRYLIN 0.5 MG capsule Take 1 capsule 0.5 mg po qd Mon-Friday, 2 capsules (1 mg) po qd Sat, Sun 40 capsule 3     atenolol (TENORMIN) 25 MG tablet n  TAKE 1 TABLET (25 MG) BY MOUTH DAILY NEEDS BP RECHECK FOR FURTHER REFILLS 30 tablet 0     metoprolol (LOPRESSOR) 25 MG tablet Take 1 tablet (25 mg) by mouth 2 times daily 180 tablet 0     VITAMIN D, CHOLECALCIFEROL, PO Take 2,000 Units by mouth daily        hypromellose (ARTIFICIAL TEARS) 0.5 % SOLN Place 1 drop into both eyes 4 times daily as needed       aspirin 81 MG tablet Take by mouth daily 30 tablet 0     Allergies   Allergen Reactions     Hydrea [Hydroxyurea] Rash       Reviewed and updated as needed this visit by clinical staff       Reviewed and updated as needed this visit by Provider         ROS:  CONSTITUTIONAL: NEGATIVE for fever, chills, change in weight  ENT/MOUTH: NEGATIVE for ear, mouth and throat problems  RESP: NEGATIVE for significant cough or SOB  CV: NEGATIVE for chest pain, palpitations or peripheral edema    OBJECTIVE:     /61 (Cuff Size: Adult Regular)  Pulse 55  Temp 98.1  F (36.7  C) (Tympanic)  Resp 18  Ht 5' 5\" (1.651 m)  Wt 104 lb 10 oz (47.5 kg)  SpO2 100%  Breastfeeding? No  BMI 17.41 kg/m2  Body mass index is 17.41 kg/(m^2).  GENERAL: healthy, alert and no distress  NECK: no adenopathy, no asymmetry, masses, or scars and thyroid normal to palpation  RESP: lungs wit bilateral rhonchi.  Oxygen sats look good.    CV: regular rate and rhythm, normal S1 S2, no S3 or S4, no murmur, click or rub, no peripheral edema and peripheral pulses strong  ABDOMEN: soft, nontender, no hepatosplenomegaly, no masses and bowel sounds normal  MS: no gross musculoskeletal defects noted, no edema        ASSESSMENT/PLAN:             1. Cough  Suspected bronchitis.  cxr is clear.   - XR Chest 2 Views; " Future  - amoxicillin (AMOXIL) 500 MG capsule; Take 1 capsule (500 mg) by mouth 2 times daily  Dispense: 20 capsule; Refill: 0    ASSESSMENT/PLAN:      ICD-10-CM    1. Cough R05 XR Chest 2 Views     amoxicillin (AMOXIL) 500 MG capsule       Patient Instructions   1.  You have a little chest congestion today    2. Use the antibiotic for ten days    3.  Come back if not better.           Dorian Jensen MD  [facility]

## 2018-04-13 NOTE — LETTER
4/13/2018    Dorian Jensen MD  [address] [facility] [address]    RE: Maggie Baires       Dear Colleague,    I had the pleasure of seeing Maggie Baires in the [facility].    HPI and Plan:   See dictation    Orders Placed This Encounter   Procedures     Follow-Up with Cardiologist       No orders of the defined types were placed in this encounter.      Medications Discontinued During This Encounter   Medication Reason     metoprolol (LOPRESSOR) 25 MG tablet          Encounter Diagnoses   Name Primary?     Coronary artery disease involving native coronary artery of native heart without angina pectoris Yes     Cardiac pacemaker in situ        CURRENT MEDICATIONS:  Current Outpatient Prescriptions   Medication Sig Dispense Refill     amoxicillin (AMOXIL) 500 MG capsule Take 1 capsule (500 mg) by mouth 2 times daily 20 capsule 0     levothyroxine (SYNTHROID/LEVOTHROID) 75 MCG tablet TAKE ONE TABLET BY MOUTH ONE TIME DAILY 30 tablet 0     AGRYLIN 0.5 MG capsule Take 1 capsule 0.5 mg po qd Mon-Friday, 2 capsules (1 mg) po qd Sat, Sun 40 capsule 3     atenolol (TENORMIN) 25 MG tablet n  TAKE 1 TABLET (25 MG) BY MOUTH DAILY NEEDS BP RECHECK FOR FURTHER REFILLS 30 tablet 0     VITAMIN D, CHOLECALCIFEROL, PO Take 2,000 Units by mouth daily        hypromellose (ARTIFICIAL TEARS) 0.5 % SOLN Place 1 drop into both eyes 4 times daily as needed       aspirin 81 MG tablet Take by mouth daily 30 tablet 0       ALLERGIES     Allergies   Allergen Reactions     Hydrea [Hydroxyurea] Rash       PAST MEDICAL HISTORY:  Past Medical History:   Diagnosis Date     ACS (acute coronary syndrome) (H) 11/1/2013     ET (essential thrombocythemia) (H) 4/20/2010     Generalized osteoarthrosis, unspecified site      Hyperlipidemia LDL goal <70 11/12/2013     HYPOTHYROIDISM NOS 1/11/2007     Malignant neoplasm (H)      Other specified cardiac dysrhythmias(427.89)      Primary localized osteoarthrosis,  lower leg      Qualitative platelet defects (H)      Thyrotoxicosis without mention of goiter or other cause, without mention of thyrotoxic crisis or storm      Unspecified cataract 5/2004    left eye     Unspecified cataract 6/2004    RIGHT EYE     Vitamin D deficiency 1/29/2010       PAST SURGICAL HISTORY:  Past Surgical History:   Procedure Laterality Date     CARDIAC CATHERIZATION  11/1/13    NSTEMI     COLONOSCOPY  12-19-95    Normal Colonosocpy with Normal Barium enema     SURGICAL HISTORY OF -       left cataract     SURGICAL HISTORY OF -       hemorrhoids     SURGICAL HISTORY OF -       left cataract     SURGICAL HISTORY OF -       right cataract     SURGICAL HISTORY OF -       hernia       FAMILY HISTORY:  Family History   Problem Relation Age of Onset     CANCER Mother      uterine?     CANCER Brother      throat     CANCER Sister      in her 70s, unknown type     DIABETES Sister        SOCIAL HISTORY:  Social History     Social History     Marital status:      Spouse name: N/A     Number of children: N/A     Years of education: N/A     Social History Main Topics     Smoking status: Former Smoker     Quit date: 12/28/1965     Smokeless tobacco: Never Used     Alcohol use No     Drug use: No     Sexual activity: No      Comment:  passed away 10-07     Other Topics Concern     Not on file     Social History Narrative       Review of Systems:  Skin:  Positive for itching     Eyes:  Positive for glasses;double vision    ENT:  Positive for hearing loss;hoarseness;sinus trouble    Respiratory:  Negative       Cardiovascular:  Negative Positive for;dizziness    Gastroenterology: Positive for constipation;abdominal pain    Genitourinary:  Negative      Musculoskeletal:  Positive for joint pain;joint swelling;joint stiffness;arthritis    Neurologic:  Positive for numbness or tingling of feet    Psychiatric:  Negative      Heme/Lymph/Imm:  Negative      Endocrine:  Positive for thyroid disorder       Physical Exam:  Vitals: /69 (BP Location: Right arm, Patient Position: Sitting, Cuff Size: Adult Regular)  Pulse 57  Wt 47.2 kg (104 lb)  SpO2 98%  BMI 17.31 kg/m2    Constitutional:  cooperative;in no acute distress        Skin:  warm and dry to the touch          Head:  normocephalic        Eyes:  sclera white        Lymph:No Cervical lymphadenopathy present     ENT:  no pallor or cyanosis        Neck:  not assessed this visit        Respiratory:  clear to auscultation         Cardiac: normal S1 and S2   S4            pulses full and equal                                        GI:  abdomen soft        Extremities and Muscular Skeletal:  no edema              Neurological:  affect appropriate        Psych:  Alert and Oriented x 3        CC  No referring provider defined for this encounter.                Thank you for allowing me to participate in the care of your patient.      Sincerely,     Yaron Arango MD     [facility]    cc:   No referring provider defined for this encounter.

## 2018-04-13 NOTE — PROGRESS NOTES
HPI and Plan:   See dictation    Orders Placed This Encounter   Procedures     Follow-Up with Cardiologist       No orders of the defined types were placed in this encounter.      Medications Discontinued During This Encounter   Medication Reason     metoprolol (LOPRESSOR) 25 MG tablet          Encounter Diagnoses   Name Primary?     Coronary artery disease involving native coronary artery of native heart without angina pectoris Yes     Cardiac pacemaker in situ        CURRENT MEDICATIONS:  Current Outpatient Prescriptions   Medication Sig Dispense Refill     amoxicillin (AMOXIL) 500 MG capsule Take 1 capsule (500 mg) by mouth 2 times daily 20 capsule 0     levothyroxine (SYNTHROID/LEVOTHROID) 75 MCG tablet TAKE ONE TABLET BY MOUTH ONE TIME DAILY 30 tablet 0     AGRYLIN 0.5 MG capsule Take 1 capsule 0.5 mg po qd Mon-Friday, 2 capsules (1 mg) po qd Sat, Sun 40 capsule 3     atenolol (TENORMIN) 25 MG tablet n  TAKE 1 TABLET (25 MG) BY MOUTH DAILY NEEDS BP RECHECK FOR FURTHER REFILLS 30 tablet 0     VITAMIN D, CHOLECALCIFEROL, PO Take 2,000 Units by mouth daily        hypromellose (ARTIFICIAL TEARS) 0.5 % SOLN Place 1 drop into both eyes 4 times daily as needed       aspirin 81 MG tablet Take by mouth daily 30 tablet 0       ALLERGIES     Allergies   Allergen Reactions     Hydrea [Hydroxyurea] Rash       PAST MEDICAL HISTORY:  Past Medical History:   Diagnosis Date     ACS (acute coronary syndrome) (H) 11/1/2013     ET (essential thrombocythemia) (H) 4/20/2010     Generalized osteoarthrosis, unspecified site      Hyperlipidemia LDL goal <70 11/12/2013     HYPOTHYROIDISM NOS 1/11/2007     Malignant neoplasm (H)      Other specified cardiac dysrhythmias(427.89)      Primary localized osteoarthrosis, lower leg      Qualitative platelet defects (H)      Thyrotoxicosis without mention of goiter or other cause, without mention of thyrotoxic crisis or storm      Unspecified cataract 5/2004    left eye     Unspecified cataract  6/2004    RIGHT EYE     Vitamin D deficiency 1/29/2010       PAST SURGICAL HISTORY:  Past Surgical History:   Procedure Laterality Date     CARDIAC CATHERIZATION  11/1/13    NSTEMI     COLONOSCOPY  12-19-95    Normal Colonosocpy with Normal Barium enema     SURGICAL HISTORY OF -       left cataract     SURGICAL HISTORY OF -       hemorrhoids     SURGICAL HISTORY OF -       left cataract     SURGICAL HISTORY OF -       right cataract     SURGICAL HISTORY OF -       hernia       FAMILY HISTORY:  Family History   Problem Relation Age of Onset     CANCER Mother      uterine?     CANCER Brother      throat     CANCER Sister      in her 70s, unknown type     DIABETES Sister        SOCIAL HISTORY:  Social History     Social History     Marital status:      Spouse name: N/A     Number of children: N/A     Years of education: N/A     Social History Main Topics     Smoking status: Former Smoker     Quit date: 12/28/1965     Smokeless tobacco: Never Used     Alcohol use No     Drug use: No     Sexual activity: No      Comment:  passed away 10-07     Other Topics Concern     Not on file     Social History Narrative       Review of Systems:  Skin:  Positive for itching     Eyes:  Positive for glasses;double vision    ENT:  Positive for hearing loss;hoarseness;sinus trouble    Respiratory:  Negative       Cardiovascular:  Negative Positive for;dizziness    Gastroenterology: Positive for constipation;abdominal pain    Genitourinary:  Negative      Musculoskeletal:  Positive for joint pain;joint swelling;joint stiffness;arthritis    Neurologic:  Positive for numbness or tingling of feet    Psychiatric:  Negative      Heme/Lymph/Imm:  Negative      Endocrine:  Positive for thyroid disorder      Physical Exam:  Vitals: /69 (BP Location: Right arm, Patient Position: Sitting, Cuff Size: Adult Regular)  Pulse 57  Wt 47.2 kg (104 lb)  SpO2 98%  BMI 17.31 kg/m2    Constitutional:  cooperative;in no acute distress         Skin:  warm and dry to the touch          Head:  normocephalic        Eyes:  sclera white        Lymph:No Cervical lymphadenopathy present     ENT:  no pallor or cyanosis        Neck:  not assessed this visit        Respiratory:  clear to auscultation         Cardiac: normal S1 and S2   S4            pulses full and equal                                        GI:  abdomen soft        Extremities and Muscular Skeletal:  no edema              Neurological:  affect appropriate        Psych:  Alert and Oriented x 3        CC  No referring provider defined for this encounter.

## 2018-04-13 NOTE — MR AVS SNAPSHOT
After Visit Summary   4/13/2018    Maggie Baires    MRN: [id number]           Patient Information     Date Of Birth          1/10/1928        Visit Information        Provider Department      4/13/2018 10:30 AM Yaron Arango MD [facility]        Today's Diagnoses     Coronary artery disease involving native coronary artery of native heart without angina pectoris    -  1    Cardiac pacemaker in situ          Care Instructions    [facility]~[address]~[place], [place]~[address]  Thank you for your  Heart Care visit today. If you have questions regarding your visit, please contact your cardiology RN's, Kylie Maldonado or Lona Montoya, at [phone]. Your provider has recommended the following:  Medication Changes:  1. Metoprolol and Atenolol were listed on your medication sheet. You should not take both medications. Metoprolol was removed. You should stay on Atenolol.  Recommendations:  1. If your chest congestion does not improve in the next 5-7 days, please make sure you follow up with your primary MD to evaluate.  Follow-up:  See Dr. Arango for cardiology follow up in one year. Sooner if needed.  To schedule a future appointment, we kindly ask that you call cardiology scheduling at [phone] three months prior to requested revisit date.               Reminder:  For your safety, we ask that you bring in your current medication(s) or an updated list of your medications with you to EACH office visit. Include the medication name, dose of pill on bottle and how you are taking it. Include over-the-counter medications or supplements. Your provider will review this at each visit and plan your care based on your current information.               Follow-ups after your visit        Additional Services     Follow-Up with Cardiologist                 Your next 10 appointments already scheduled     Jun 06,  2018  2:30 PM CDT   Phone Device Check with [place] [facility]   [facility] ([facility])    [address] [address]   [phone]            Jul 05, 2018 11:00 AM CDT   LAB with [facility]   [facility] ([facility])    [address] [place] [address]   [phone]           Please do not eat 10-12 hours before your appointment if you are coming in fasting for labs on lipids, cholesterol, or glucose (sugar). This does not apply to pregnant women. Water, hot tea and black coffee (with nothing added) are okay. Do not drink other fluids, diet soda or chew gum.            Jul 09, 2018 11:30 AM CDT   Return Visit with Annabel Pelaez MD   [facility] ([facility])    [facility]  [address]  [facility] [address]   [phone]              Future tests that were ordered for you today     Open Future Orders        Priority Expected Expires Ordered    Follow-Up with Cardiologist Routine 4/13/2019 4/14/2019 4/13/2018            Who to contact     If you have questions or need follow up information about today's clinic visit or your schedule please contact [facility] directly at [phone].  Normal or non-critical lab and imaging results will be communicated to you by ChangeTiphart, letter or phone within 4 business days after the clinic has received the results. If you do not hear from us within 7 days, please contact the clinic through ChangeTiphart or phone. If you have a critical or abnormal lab result, we will notify you by phone as soon as possible.  Submit refill requests through alaTest or call your pharmacy and they will forward the refill request to us. Please allow 3 business days for your refill to be completed.          Additional Information About Your Visit        alaTest Information     alaTest lets you send messages to  "your doctor, view your test results, renew your prescriptions, schedule appointments and more. To sign up, go to [email or web address] . Click on \"Log in\" on the left side of the screen, which will take you to the Welcome page. Then click on \"Sign up Now\" on the right side of the page.     You will be asked to enter the access code listed below, as well as some personal information. Please follow the directions to create your username and password.     Your access code is: [email or web address]  Expires: 2018 10:58 AM     Your access code will  in 90 days. If you need help or a new code, please call your [place] clinic or [phone].        Care EveryWhere ID     This is your Care EveryWhere ID. This could be used by other organizations to access your [place] medical records  [id number]        Your Vitals Were     Pulse Pulse Oximetry BMI (Body Mass Index)             57 98% 17.31 kg/m2          Blood Pressure from Last 3 Encounters:   18 149/69   18 141/61   18 155/61    Weight from Last 3 Encounters:   18 47.2 kg (104 lb)   18 47.5 kg (104 lb 10 oz)   18 47.4 kg (104 lb 9.6 oz)                 Today's Medication Changes          These changes are accurate as of 18 10:56 AM.  If you have any questions, ask your nurse or doctor.               Stop taking these medicines if you haven't already. Please contact your care team if you have questions.     metoprolol tartrate 25 MG tablet   Commonly known as:  LOPRESSOR                    Primary Care Provider Office Phone # Fax #    Dorian Jensen [phone]       [address] [place], [place] [address]        Equal Access to Services     [facility] ERIC : Alonso Ferrer, carolina whyte, liam cabrera. [facility] [phone].    ATENCIÓN: Si habla español, tiene a singleton disposición servicios gratuitos de asistencia lingüística. " Whitney franco [phone].    We comply with applicable federal civil rights laws and [place] laws. We do not discriminate on the basis of race, color, national origin, age, disability, sex, sexual orientation, or gender identity.            Thank you!     Thank you for choosing [facility]  for your care. Our goal is always to provide you with excellent care. Hearing back from our patients is one way we can continue to improve our services. Please take a few minutes to complete the written survey that you may receive in the mail after your visit with us. Thank you!             Your Updated Medication List - Protect others around you: Learn how to safely use, store and throw away your medicines at www.disposemymeds.org.          This list is accurate as of 4/13/18 10:56 AM.  Always use your most recent med list.                   Brand Name Dispense Instructions for use Diagnosis    AGRYLIN 0.5 MG capsule   Generic drug:  anagrelide     40 capsule    Take 1 capsule 0.5 mg po qd Mon-Friday, 2 capsules (1 mg) po qd Tesha Gillette    ET (essential thrombocythemia) (H)       amoxicillin 500 MG capsule    AMOXIL    20 capsule    Take 1 capsule (500 mg) by mouth 2 times daily    Cough       aspirin 81 MG tablet     30 tablet    Take by mouth daily        atenolol 25 MG tablet    TENORMIN    30 tablet    n TAKE 1 TABLET (25 MG) BY MOUTH DAILY NEEDS BP RECHECK FOR FURTHER REFILLS    ACS (acute coronary syndrome) (H)       hypromellose 0.5 % Soln ophthalmic solution    ARTIFICIAL TEARS     Place 1 drop into both eyes 4 times daily as needed        levothyroxine 75 MCG tablet    SYNTHROID/LEVOTHROID    30 tablet    TAKE ONE TABLET BY MOUTH ONE TIME DAILY    Hypothyroidism, unspecified type       VITAMIN D (CHOLECALCIFEROL) PO      Take 2,000 Units by mouth daily

## 2018-04-13 NOTE — LETTER
2018      Dorian Jensen MD  [address] [place] [id number]      RE: Jaspreet Baires       Dear Colleague,    I had the pleasure of seeing Jaspreet Baires in the [facility].    Service Date: 2018      HISTORY OF PRESENT ILLNESS:  Ms. Baires is a pleasant 89-year-old female with a history of supraventricular tachycardia treated with a beta blocker, permanent pacemaker implantation for sick sinus syndrome, and nonobstructive coronary artery disease, and intolerance to multiple statins who returns in annual followup late.  I saw her last 18 months ago.      The patient continues to do well from a cardiovascular standpoint, denying any chest pain, pressure, shortness of breath, orthopnea or paroxysmal nocturnal dyspnea.      Her most recent telephone device check was unremarkable.      In reviewing her chart, she has both atenolol and metoprolol listed.  The daughter who accompanies the patient at the visit states that she is not taking metoprolol at the present time.      Please see my separate note with her full physical examination.      IMPRESSION AND PLAN:  Ms. Baires is a pleasant 89-year-old female with stable coronary artery disease and no anginal symptoms at the present time.  Unfortunately, she cannot take statins as detailed in prior notes.  She will remain on aspirin 81 mg a day unchanged for secondary prevention.  I have asked the daughter to go home and make sure the patient is not currently taking metoprolol.  She should remain on her atenolol unchanged for her SVT.  She will also continue to follow in the Device Clinic as directed by the Device Clinic.  I will plan to see the patient back in routine clinical followup in 1 year.         VAN ELY MD             D: 2018   T: 2018   MT: SHASTA      Name:     JASPREET BAIRES   MRN:      [id number]        Account:      [id number]   :      01/10/1928           Service Date:  04/13/2018      Document: [id number]         Outpatient Encounter Prescriptions as of 4/13/2018   Medication Sig Dispense Refill     AGRYLIN 0.5 MG capsule Take 1 capsule 0.5 mg po qd Mon-Friday, 2 capsules (1 mg) po qd Sat, Sun 40 capsule 3     amoxicillin (AMOXIL) 500 MG capsule Take 1 capsule (500 mg) by mouth 2 times daily 20 capsule 0     aspirin 81 MG tablet Take by mouth daily 30 tablet 0     atenolol (TENORMIN) 25 MG tablet n  TAKE 1 TABLET (25 MG) BY MOUTH DAILY NEEDS BP RECHECK FOR FURTHER REFILLS 30 tablet 0     hypromellose (ARTIFICIAL TEARS) 0.5 % SOLN Place 1 drop into both eyes 4 times daily as needed       levothyroxine (SYNTHROID/LEVOTHROID) 75 MCG tablet TAKE ONE TABLET BY MOUTH ONE TIME DAILY 30 tablet 0     VITAMIN D, CHOLECALCIFEROL, PO Take 2,000 Units by mouth daily        [DISCONTINUED] metoprolol (LOPRESSOR) 25 MG tablet Take 1 tablet (25 mg) by mouth 2 times daily 180 tablet 0     No facility-administered encounter medications on file as of 4/13/2018.      Again, thank you for allowing me to participate in the care of your patient.      Sincerely,    Yaron Arango MD     [facility]

## 2018-04-13 NOTE — PATIENT INSTRUCTIONS
[facility]  [facility]~[address]~[place], [place]~[address]  Thank you for your [facility] visit today. If you have questions regarding your visit, please contact your cardiology RN's, Kylie Maldonado or Lona Montoya, at [phone]. Your provider has recommended the following:  Medication Changes:  1. Metoprolol and Atenolol were listed on your medication sheet. You should not take both medications. Metoprolol was removed. You should stay on Atenolol.  Recommendations:  1. If your chest congestion does not improve in the next 5-7 days, please make sure you follow up with your primary MD to evaluate.  Follow-up:  See Dr. Arango for cardiology follow up in one year. Sooner if needed.  To schedule a future appointment, we kindly ask that you call cardiology scheduling at [phone] three months prior to requested revisit date.               Reminder:  For your safety, we ask that you bring in your current medication(s) or an updated list of your medications with you to EACH office visit. Include the medication name, dose of pill on bottle and how you are taking it. Include over-the-counter medications or supplements. Your provider will review this at each visit and plan your care based on your current information.

## 2018-04-13 NOTE — PROGRESS NOTES
Service Date: 2018      HISTORY OF PRESENT ILLNESS:  Ms. Baires is a pleasant 89-year-old female with a history of supraventricular tachycardia treated with a beta blocker, permanent pacemaker implantation for sick sinus syndrome, and nonobstructive coronary artery disease, and intolerance to multiple statins who returns in annual followup late.  I saw her last 18 months ago.      The patient continues to do well from a cardiovascular standpoint, denying any chest pain, pressure, shortness of breath, orthopnea or paroxysmal nocturnal dyspnea.      Her most recent telephone device check was unremarkable.      In reviewing her chart, she has both atenolol and metoprolol listed.  The daughter who accompanies the patient at the visit states that she is not taking metoprolol at the present time.      Please see my separate note with her full physical examination.      IMPRESSION AND PLAN:  Ms. Baires is a pleasant 89-year-old female with stable coronary artery disease and no anginal symptoms at the present time.  Unfortunately, she cannot take statins as detailed in prior notes.  She will remain on aspirin 81 mg a day unchanged for secondary prevention.  I have asked the daughter to go home and make sure the patient is not currently taking metoprolol.  She should remain on her atenolol unchanged for her SVT.  She will also continue to follow in the Device Clinic as directed by the Device Clinic.  I will plan to see the patient back in routine clinical followup in 1 year.         VAN ELY MD             D: 2018   T: 2018   MT: SHASTA      Name:     JASPREET BAIRES   MRN:      [id number]        Account:      [id number]   :      01/10/1928           Service Date: 2018      Document: [id number]

## 2018-04-18 NOTE — TELEPHONE ENCOUNTER
Maggie is calling with some questions on her Atenolol. Please advise.    Dione P[place]

## 2018-04-25 NOTE — PROGRESS NOTES
SUBJECTIVE:   Maggie Baires is a 89 year old female who presents to clinic today for the following health issues:      Abdominal Pain  Severe pain with motion in the RLQ.       Duration: Has been ongoing for the past week    Description (location/character/radiation): patient will get shooting pain with certain movements lower right quad.       Associated flank pain: None    Intensity:  moderate    Accompanying signs and symptoms:        Fever/Chills: no        Gas/Bloating: YES       Nausea/vomitting: no        Diarrhea: no        Dysuria or Hematuria: no     History (previous similar pain/trauma/previous testing): None    Precipitating or alleviating factors:       Pain worse with eating/BM/urination: No       Pain relieved by BM: no     Therapies tried and outcome: None    LMP:  not applicable          Problem list and histories reviewed & adjusted, as indicated.  Additional history: as documented    Patient Active Problem List   Diagnosis     Other specified cardiac dysrhythmias(427.89)     Hypothyroidism     Generalized osteoarthrosis, unspecified site     Vitamin D deficiency     ET (essential thrombocythemia) (H)     Advanced directives, counseling/discussion     ACS (acute coronary syndrome) (H)     Hyperlipidemia LDL goal <70     Primary pulmonary hypertension (H)     Past Surgical History:   Procedure Laterality Date     CARDIAC CATHERIZATION  11/1/13    NSTEMI     COLONOSCOPY  12-19-95    Normal Colonosocpy with Normal Barium enema     SURGICAL HISTORY OF -       left cataract     SURGICAL HISTORY OF -       hemorrhoids     SURGICAL HISTORY OF -       left cataract     SURGICAL HISTORY OF -       right cataract     SURGICAL HISTORY OF -       hernia       Social History   Substance Use Topics     Smoking status: Former Smoker     Quit date: 12/28/1965     Smokeless tobacco: Never Used     Alcohol use No     Family History   Problem Relation Age of Onset     CANCER Mother      uterine?     CANCER Brother   "    throat     CANCER Sister      in her 70s, unknown type     DIABETES Sister          Current Outpatient Prescriptions   Medication Sig Dispense Refill     AGRYLIN 0.5 MG capsule Take 1 capsule 0.5 mg po qd Mon-Friday, 2 capsules (1 mg) po qd Sat, Sun 40 capsule 3     aspirin 81 MG tablet Take by mouth daily 30 tablet 0     atenolol (TENORMIN) 25 MG tablet n  TAKE 1 TABLET (25 MG) BY MOUTH DAILY NEEDS BP RECHECK FOR FURTHER REFILLS 30 tablet 0     hypromellose (ARTIFICIAL TEARS) 0.5 % SOLN Place 1 drop into both eyes 4 times daily as needed       levothyroxine (SYNTHROID/LEVOTHROID) 75 MCG tablet TAKE ONE TABLET BY MOUTH ONE TIME DAILY 30 tablet 0     VITAMIN D, CHOLECALCIFEROL, PO Take 2,000 Units by mouth daily        amoxicillin (AMOXIL) 500 MG capsule Take 1 capsule (500 mg) by mouth 2 times daily (Patient not taking: Reported on 4/25/2018) 20 capsule 0       Reviewed and updated as needed this visit by clinical staff  Tobacco  Allergies  Meds  Med Hx  Surg Hx  Fam Hx  Soc Hx      Reviewed and updated as needed this visit by Provider         ROS:  CONSTITUTIONAL: NEGATIVE for fever, chills, change in weight  ENT/MOUTH: NEGATIVE for ear, mouth and throat problems  RESP: NEGATIVE for significant cough or SOB  CV: NEGATIVE for chest pain, palpitations or peripheral edema    OBJECTIVE:     /68  Pulse 59  Temp 98.9  F (37.2  C) (Tympanic)  Resp 16  Ht 5' 5.24\" (1.657 m)  Wt 104 lb 6.4 oz (47.4 kg)  BMI 17.25 kg/m2  Body mass index is 17.25 kg/(m^2).  GENERAL: healthy, alert and no distress  NECK: no adenopathy, no asymmetry, masses, or scars and thyroid normal to palpation  RESP: lungs clear to auscultation - no rales, rhonchi or wheezes  CV: regular rate and rhythm, normal S1 S2, no S3 or S4, no murmur, click or rub, no peripheral edema and peripheral pulses strong  ABDOMEN: MARKED TENDERNESS TO PALPATION.  NO REBOUND.   MS: no gross musculoskeletal defects noted, no edema        ASSESSMENT/PLAN: "     ASSESSMENT/PLAN: WILL GET CT TO RULE OUT ACUTE APPENDICITIS       ICD-10-CM    1. Abdominal pain, right lower quadrant R10.31 CT Abdomen Pelvis w Contrast                         Dorian Jensen MD  [facility]

## 2018-04-25 NOTE — MR AVS SNAPSHOT
After Visit Summary   4/25/2018    Maggie Baires    MRN: [id number]           Patient Information     Date Of Birth          1/10/1928        Visit Information        Provider Department      4/25/2018 1:00 PM Dorian Jensen MD [facility]        Today's Diagnoses     Abdominal pain, right lower quadrant    -  1       Follow-ups after your visit        Your next 10 appointments already scheduled     Apr 25, 2018  2:30 PM CDT   (Arrive by 2:15 PM)   CT ABDOMEN PELVIS W CONTRAST with [facility]   [facility] ([facility])    [address] [facility] [address]   [phone]           Please bring any scans or X-rays taken at other hospitals, if similar tests were done. Also bring a list of your medicines, including vitamins, minerals and over-the-counter drugs. It is safest to leave personal items at home.  Be sure to tell your doctor:   If you have any allergies.   If there s any chance you are pregnant.   If you are breastfeeding.  How to prepare:   Do not eat or drink for 2 hours before your exam. If you need to take medicine, you may take it with small sips of water. (We may ask you to take liquid medicine as well.)   Please wear loose clothing, such as a sweat suit or jogging clothes. Avoid snaps, zippers and other metal. We may ask you to undress and put on a hospital gown.  Please arrive 30 minutes early for your CT. Once in the department you might be asked to drink water 15-20 minutes prior to your exam.  If indicated you may be asked to drink an oral contrast in advance of your CT.  If this is the case, the imaging team will let you know or be in contact with you prior to your appointment  Patients over 70 or patients with diabetes or kidney problems:   If you haven t had a blood test (creatinine test) within the last 30 days, the Cardiologist/Radiologist may require you to get this test prior to your exam.  If you have diabetes:    Continue to take your metformin medication on the day of your exam  If you have any questions, please call the Imaging Department where you will have your exam.            Jun 06, 2018  2:30 PM CDT   Phone Device Check with [facility]   [facility] ([facility])    [address] [facility] [address]   [phone]            Jul 05, 2018 11:00 AM CDT   LAB with [facility] LAB   [facility] ([facility])    [address] [place] [address]   [phone]           Please do not eat 10-12 hours before your appointment if you are coming in fasting for labs on lipids, cholesterol, or glucose (sugar). This does not apply to pregnant women. Water, hot tea and black coffee (with nothing added) are okay. Do not drink other fluids, diet soda or chew gum.            Jul 09, 2018 11:30 AM CDT   Return Visit with Annabel Pelaez MD   [facility] ([facility])    [facility]  [address] [facility] [address]  [facility] [address]   [phone]              Future tests that were ordered for you today     Open Future Orders        Priority Expected Expires Ordered    CT Abdomen Pelvis w Contrast STAT  4/25/2019 4/25/2018            Who to contact     If you have questions or need follow up information about today's clinic visit or your schedule please contact [facility] directly at [phone].  Normal or non-critical lab and imaging results will be communicated to you by MyChart, letter or phone within 4 business days after the clinic has received the results. If you do not hear from us within 7 days, please contact the clinic through MyChart or phone. If you have a critical or abnormal lab result, we will notify you by phone as soon as possible.  Submit refill requests through [employer] or call your pharmacy and they will forward the refill request to us. Please allow 3 business days for  "your refill to be completed.          Additional Information About Your Visit        [employer] Information     [employer] lets you send messages to your doctor, view your test results, renew your prescriptions, schedule appointments and more. To sign up, go to [email or web address] . Click on \"Log in\" on the left side of the screen, which will take you to the Welcome page. Then click on \"Sign up Now\" on the right side of the page.     You will be asked to enter the access code listed below, as well as some personal information. Please follow the directions to create your username and password.     Your access code is: [email or web address]  Expires: 2018 10:58 AM     Your access code will  in 90 days. If you need help or a new code, please call your [facility] clinic or [phone].        Care EveryWhere ID     This is your [facility] EveryWhere ID. This could be used by other organizations to access your [facility] medical records  [id number]        Your Vitals Were     Pulse Temperature Respirations Height BMI (Body Mass Index)       59 98.9  F (37.2  C) (Tympanic) 16 5' 5.24\" (1.657 m) 17.25 kg/m2        Blood Pressure from Last 3 Encounters:   18 146/68   18 149/69   18 141/61    Weight from Last 3 Encounters:   18 104 lb 6.4 oz (47.4 kg)   18 104 lb (47.2 kg)   18 104 lb 10 oz (47.5 kg)               Primary Care Provider Office Phone # Fax #    Dorian Jensen [phone]       [address] [place], [place] [address]        Equal Access to Services     [facility] ERIC : Alonso Ferrer, carolina whyte, иван cisneros, liam marx. [facility] [phone].    ATENCIÓN: Si habla español, tiene a singleton disposición servicios gratuitos de asistencia lingüística. Whitney al [phone].    We comply with applicable federal civil rights laws and [place] laws. We do not discriminate on the basis of race, color, " national origin, age, disability, sex, sexual orientation, or gender identity.            Thank you!     Thank you for choosing [facility]  for your care. Our goal is always to provide you with excellent care. Hearing back from our patients is one way we can continue to improve our services. Please take a few minutes to complete the written survey that you may receive in the mail after your visit with us. Thank you!             Your Updated Medication List - Protect others around you: Learn how to safely use, store and throw away your medicines at www.disposemymeds.org.          This list is accurate as of 4/25/18  1:56 PM.  Always use your most recent med list.                   Brand Name Dispense Instructions for use Diagnosis    AGRYLIN 0.5 MG capsule   Generic drug:  anagrelide     40 capsule    Take 1 capsule 0.5 mg po qd Mon-Friday, 2 capsules (1 mg) po qd Sat, Tesha    ET (essential thrombocythemia) (H)       amoxicillin 500 MG capsule    AMOXIL    20 capsule    Take 1 capsule (500 mg) by mouth 2 times daily    Cough       aspirin 81 MG tablet     30 tablet    Take by mouth daily        atenolol 25 MG tablet    TENORMIN    30 tablet    n TAKE 1 TABLET (25 MG) BY MOUTH DAILY NEEDS BP RECHECK FOR FURTHER REFILLS    ACS (acute coronary syndrome) (H)       hypromellose 0.5 % Soln ophthalmic solution    ARTIFICIAL TEARS     Place 1 drop into both eyes 4 times daily as needed        levothyroxine 75 MCG tablet    SYNTHROID/LEVOTHROID    30 tablet    TAKE ONE TABLET BY MOUTH ONE TIME DAILY    Hypothyroidism, unspecified type       VITAMIN D (CHOLECALCIFEROL) PO      Take 2,000 Units by mouth daily

## 2018-04-25 NOTE — NURSING NOTE
"Chief Complaint   Patient presents with     Abdominal Pain     Right lower quadrant- has been ongoing for about a week, pain will happen with certain movements       Initial /68  Pulse 59  Temp 98.9  F (37.2  C) (Tympanic)  Resp 16  Ht 5' 5.24\" (1.657 m)  Wt 104 lb 6.4 oz (47.4 kg)  BMI 17.25 kg/m2 Estimated body mass index is 17.25 kg/(m^2) as calculated from the following:    Height as of this encounter: 5' 5.24\" (1.657 m).    Weight as of this encounter: 104 lb 6.4 oz (47.4 kg).  Medication Reconciliation: complete    "

## 2018-06-04 NOTE — TELEPHONE ENCOUNTER
"Requested Prescriptions   Pending Prescriptions Disp Refills     atenolol (TENORMIN) 25 MG tablet 30 tablet 0     Sig: n  TAKE 1 TABLET (25 MG) BY MOUTH DAILY NEEDS BP RECHECK FOR FURTHER REFILLS    Beta-Blockers Protocol Failed    6/4/2018  9:31 AM       Failed - Blood pressure under 140/90 in past 12 months    BP Readings from Last 3 Encounters:   04/25/18 146/68   04/13/18 149/69   04/05/18 141/61                Passed - Patient is age 6 or older       Passed - Recent (12 mo) or future (30 days) visit within the authorizing provider's specialty    Patient had office visit in the last 12 months or has a visit in the next 30 days with authorizing provider or within the authorizing provider's specialty.  See \"Patient Info\" tab in inbasket, or \"Choose Columns\" in Meds & Orders section of the refill encounter.            Last Written Prescription Date:  2/7/18  Last Fill Quantity: 30,  # refills: 0   Last office visit: 4/25/2018 with prescribing provider:  Mikey   Future Office Visit:   Next 5 appointments (look out 90 days)     Jul 09, 2018 11:30 AM CDT   Return Visit with Annabel Pelaez MD   [facility] ([facility])    [facility]  [address] [address]   [phone]                   "

## 2018-06-06 NOTE — PROGRESS NOTES
~~90 DAY PHONE PPM TELETRACE~~Normal pacemaker function. Appropriate AP/VS (60 ppm) at time of check. 6 second strips of pre-magnet, magnet and post-magnet were saved. Magnet response WNL. F/U scheduled for annual in clinic thresholds w/ Device RN on 9/11/18 @ 1:00pm.

## 2018-07-07 NOTE — IP AVS SNAPSHOT
MRN:[id number]                      After Visit Summary   7/7/2018    Maggie Baires    MRN: [id number]           Thank you!     Thank you for choosing [facility] for your care. Our goal is always to provide you with excellent care. Hearing back from our patients is one way we can continue to improve our services. Please take a few minutes to complete the written survey that you may receive in the mail after you visit with us. Thank you!        Patient Information     Date Of Birth          1/10/1928        Designated Caregiver       Most Recent Value    Caregiver    Will someone help with your care after discharge? yes    Name of designated caregiver Kaylee - daughter    Phone number of caregiver [phone]    Caregiver address [place]      About your hospital stay     You were admitted on:  July 8, 2018 You last received care in the:  [facility]    You were discharged on:  July 8, 2018       Who to Call     For medical emergencies, please call 911.  For non-urgent questions about your medical care, please call your primary care provider or clinic, [phone]          Attending Provider     Provider Specialty    Dorian Saldivar,  Emergency Medicine    Esdras De La Vega MD Internal Medicine    [facility]       Primary Care Provider Office Phone # Fax #    Dorian Jensen [phone]      Your next 10 appointments already scheduled     Jul 09, 2018 11:30 AM CDT   Return Visit with Annabel Pelaez MD   [facility] ([facility])    [facility]  [address] [facility] [address]  [facility] [address]   [phone]            Sep 11, 2018  1:00 PM CDT   Pacemaker Check with Wy Device [place]   [facility] ([facility])    [address] [facility] [address]   [phone]              Further instructions from your care team       Your pacemaker was  "interrogated and it shows no problems.  Your platelet count is higher than previous-it is possible that this is part of the problem.  See Dr Pelaez at your up coming appointment to discuss this.  If you continue to have dizzy spells --see your primary doctor in the clinic.    Pending Results     Date and Time Order Name Status Description    7/8/2018 0600 Troponin I In process             Statement of Approval     Ordered          07/08/18 1029  I have reviewed and agree with all the recommendations and orders detailed in this document.  EFFECTIVE NOW     Approved and electronically signed by:  Ramin Posada MD             Admission Information     Date & Time Provider Department Dept. Phone    7/7/2018 Ramin Posada MD [facility] [phone]      Your Vitals Were     Blood Pressure Temperature Respirations Height Weight Pulse Oximetry    164/62 (BP Location: Left arm) 98.2  F (36.8  C) (Oral) 18 1.6 m (5' 3\") 47.4 kg (104 lb 8 oz) 97%    BMI (Body Mass Index)                   18.51 kg/m2           Care EveryWhere ID     This is your Care EveryWhere ID. This could be used by other organizations to access your [facility] medical records  [id number]        Equal Access to Services     NICOLASA REYES AH: Hadii le Ferrer, waaxda lubabakadaha, qaybta kaalmada adetonida, liam marx. So [facility] [phone].    ATENCIÓN: Si habla español, tiene a singleton disposición servicios gratuitos de asistencia lingüística. Whitney al [phone].    We comply with applicable federal civil rights laws and [place] laws. We do not discriminate on the basis of race, color, national origin, age, disability, sex, sexual orientation, or gender identity.               Review of your medicines      CONTINUE these medicines which have NOT CHANGED        Dose / Directions    AGRYLIN 0.5 MG capsule   Used for:  ET (essential thrombocythemia) (H)   Generic drug:  anagrelide        " Take 1 capsule 0.5 mg po qd Mon-Friday, 2 capsules (1 mg) po qd Sat, Sun   Quantity:  40 capsule   Refills:  3       aspirin 81 MG tablet        Take by mouth daily   Quantity:  30 tablet   Refills:  0       atenolol 25 MG tablet   Commonly known as:  TENORMIN   Used for:  ACS (acute coronary syndrome) (H)        Dose:  25 mg   Take 1 tablet (25 mg) by mouth daily   Quantity:  30 tablet   Refills:  3       hypromellose 0.5 % Soln ophthalmic solution   Commonly known as:  ARTIFICIAL TEARS        Dose:  1 drop   Place 1 drop into both eyes 4 times daily as needed   Refills:  0       levothyroxine 75 MCG tablet   Commonly known as:  SYNTHROID/LEVOTHROID   Used for:  Hypothyroidism, unspecified type        TAKE ONE TABLET BY MOUTH ONE TIME DAILY   Quantity:  90 tablet   Refills:  3       VITAMIN D (CHOLECALCIFEROL) PO        Dose:  2000 Units   Take 2,000 Units by mouth daily   Refills:  0                Protect others around you: Learn how to safely use, store and throw away your medicines at www.disposemymeds.org.             Medication List: This is a list of all your medications and when to take them. Check marks below indicate your daily home schedule. Keep this list as a reference.      Medications           Morning Afternoon Evening Bedtime As Needed    AGRYLIN 0.5 MG capsule   Take 1 capsule 0.5 mg po qd Mon-Friday, 2 capsules (1 mg) po qd Sat, Sun   Generic drug:  anagrelide   Next Dose Due:  Today 7/8/18                                   aspirin 81 MG tablet   Take by mouth daily   Last time this was given:  7/7/18   Next Dose Due:  Today 7/8/18                                   atenolol 25 MG tablet   Commonly known as:  TENORMIN   Take 1 tablet (25 mg) by mouth daily   Next Dose Due:  Today 7/8/18                                   hypromellose 0.5 % Soln ophthalmic solution   Commonly known as:  ARTIFICIAL TEARS   Place 1 drop into both eyes 4 times daily as needed                                    levothyroxine 75 MCG tablet   Commonly known as:  SYNTHROID/LEVOTHROID   TAKE ONE TABLET BY MOUTH ONE TIME DAILY   Next Dose Due:  Today 7/8/18                                   VITAMIN D (CHOLECALCIFEROL) PO   Take 2,000 Units by mouth daily   Next Dose Due:  Today 7/8/18

## 2018-07-07 NOTE — IP AVS SNAPSHOT
[facility]    [address] [facility] [address]    Phone:  [phone]    Fax:  [phone]                                       After Visit Summary   7/7/2018    Maggie Baires    MRN: [id number]           After Visit Summary Signature Page     I have received my discharge instructions, and my questions have been answered. I have discussed any challenges I see with this plan with the nurse or doctor.    ..........................................................................................................................................  Patient/Patient Representative Signature      ..........................................................................................................................................  Patient Representative Print Name and Relationship to Patient    ..................................................               ................................................  Date                                            Time    ..........................................................................................................................................  Reviewed by Signature/Title    ...................................................              ..............................................  Date                                                            Time

## 2018-07-08 PROBLEM — R55 NEAR SYNCOPE: Status: ACTIVE | Noted: 2018-01-01

## 2018-07-08 NOTE — ED NOTES
"Patient has  [place] to Observation  order. Patient has been given the Observation brochure -  What does Observation mean to me.\"  Patient has been given the opportunity to ask questions about observation status and their plan of care.      PHYLICIA SLOAN    "

## 2018-07-08 NOTE — PLAN OF CARE
Problem: Patient Care Overview  Goal: Plan of Care/Patient Progress Review  Outcome: No Change  Patient afebrile, room air. Denies pain, no palpitations felt, denies lightheadedness. General weakness, SBA to bathroom. Tele in place. Patient states she does not drink much water, encouraged to take sips throughout the night. Her children have been encouraging her to drink Ensure with meals. Lives at home alone and does not use walker/cane for ambulation, does not feel need to use them at this point. Is very independent for her age.

## 2018-07-08 NOTE — PROGRESS NOTES
ALEXANDRA YOUNGBLOODG DISCHARGE NOTE    Patient discharged to home at 12:25  PM via wheel chair. Accompanied by daughter and staff. Discharge instructions reviewed with patient and daughter, opportunity offered to ask questions. Prescriptions - None ordered for discharge. All belongings sent with patient.    Sandra Ordonez

## 2018-07-08 NOTE — DISCHARGE SUMMARY
"Admit Date:     07/07/2018   Discharge Date:           CHIEF COMPLAINT:  Dizzy spells.      HISTORY OF PRESENT ILLNESS:  Maggie Baires is a 89-year-old female with a history of hypothyroidism, hyperlipidemia, pulmonary hypertension, essential thrombocytosis, coronary disease, previous SVT, and pacemaker placement for sick sinus syndrome.  The patient lives independently in [place].  She has a daughter that looks after her.  She has been noticing dizzy spells over the last few weeks.  She was very vague with me.  I got a different history than Dr. Saldivar took in the ER.  She tells me that she has been having episodes of dizziness that occur mostly while sitting, where she gets a wave that \"goes across her eyes.\"  She sometimes gets some flashing lights in her eyes then she gets a brief feeling like she might pass out and her legs shake.  She says that it lasts only a second.  Apparently, she had a number of episodes on the day of admission including one when she was going upstairs, which she thought was somewhat unusual.  She really did not report prolonged symptoms with that to me, but did to Dr. Saldivar.      She does have a history of SVT.  She is on atenolol for that.  She has a single-chamber [employer] pacemaker that was placed in 11/2012.  This was interrogated in the emergency room and the only atrial abnormality was a 9-beat run of SVT.  They are unable to monitor her for ventricular events.      Cardiology was consulted on phone regarding the patient's symptoms.  Dr. Mcintyre from [facility] recommended telemetry observation and outpatient cardiac followup potentially.      When I saw the patient, she said that she had had no more episodes.  She had no syncope.  She had no chest pain or shortness of breath.  She notes that she gets chronic leg cramps and they were bothering her during the night.      PAST MEDICAL HISTORY:   1.  Sick sinus syndrome treated with pacemaker.   2.  SVT.   3.  Osteoarthrosis.   4. "  Vitamin deficiency.   5.  Essential thrombocytosis.   6.  Hyperlipidemia.   7.  Pulmonary hypertension.   8.  Hypothyroidism.   9.  Acute coronary syndrome as listed.  I do not have any details of this.      PAST SURGICAL HISTORY:  Includes cataract surgery, hemorrhoid surgery, some type of hernia surgery, and cardiac catheterization in 2013 for a non-ST elevation myocardial  infarction.      FAMILY MEDICAL HISTORY:  Mother with uterine cancer.  Sister with some type of cancer, type unknown.  Another sister with diabetes.  Brother with throat cancer.      HABITS:  She quit smoking in 1965.  No alcohol use.      SOCIAL HISTORY:  She lives independently.  She does have a daughter.      ALLERGIES:  HYDREA PRODUCED RASH.      REVIEW OF SYSTEMS:  No fever or chills.  She has had the above dizzy spells, possibly some associated palpitations, no chest pain, no shortness of breath, no abdominal symptoms.  No urinary tract symptoms, no skin symptoms.  No other neurological symptoms.  The rest of 10-point review of systems is negative.      PHYSICAL EXAMINATION:     GENERAL:  She is alert, talkative, bright, oriented, articulate.   VITALS:  Temperature 98.2, heart rate 63 and regular, blood pressure 155/63, respiratory rate 16, SAT 97%.   HEENT:  Ears negative.  Oral negative.  Eyes:  Pupils round, react to light.  No nystagmus.   NECK:  Supple, nontender, no adenopathy.   LUNGS:  Clear.   COR:  S1, S2, without click, rub or murmur.  Pacemaker is in place in the chest.   ABDOMEN:  Soft, benign, flat, nontender, without guarding, rebound, rigidity or mass.   GENITALIA:  Grossly normal.   EXTREMITIES:  Without edema.   NEUROLOGICAL:  Cranial nerves, motor, reflexes all within normal limits.      LABORATORY DATA:  EKG showed negative precordial T waves, sinus rhythm.  Chest x-ray was negative.  Troponins x 2 less than 0.015, magnesium 2.1.  Comprehensive metabolic panel normal except for glucose of 139 and GFR of 55.  CBC  showed white count of 9400, hemoglobin 11.6, platelet count 1,019,000.      ASSESSMENT:   1.  Somewhat vague episodes of brief dizziness, etiology unclear.   2.  History of supraventricular tachycardia with no significant history of SVT based on pacemaker interrogation.   3.  Sick sinus syndrome with pacemaker placement.   4.  History of coronary disease and previous non-STEMI.   5.  Essential thrombocytosis with higher than previous platelet count.     6.  Abnormal 12-lead EKG, but normal troponins.      PLAN:  The patient appears stable.  She cannot have an MRI because of her pacemaker.  The pacemaker was interrogated and showed no significant problems.  I do not think this is due to SVT.  I do question whether her essential thrombocytosis and high platelet count could be involved with this.  She apparently has an appointment to see Dr. Pelaez tomorrow for this.  I think she can be discharged.  If she continues to have episodes like this, I think she could see her primary doctor and they could determine if they want her to see Cardiology or Neurology.         MARGUERITE SANDOVAL MD             D: 2018   T: 2018   MT: [id number]      Name:     JASPREET CLAY   MRN:      [id number]        Account:        [id number]   :      01/10/1928           Admit Date:     2018                                  Discharge Date:       Document: [id number]

## 2018-07-08 NOTE — DISCHARGE INSTRUCTIONS
Your pacemaker was interrogated and it shows no problems.  Your platelet count is higher than previous-it is possible that this is part of the problem.  See Dr Pelaez at your up coming appointment to discuss this.  If you continue to have dizzy spells --see your primary doctor in the clinic.

## 2018-07-08 NOTE — ED PROVIDER NOTES
"  History     Chief Complaint   Patient presents with     Palpitations     HPI  Maggie Baires is a 89 year old female with past medical history which includes hypothyroidism, hyperlipidemia pulmonary hypertension, essential thrombocytopenia, CAD, previous episodes of SVT, and pacemaker placement for sick sinus syndrome who presents for complaint of intermittent episodes of lightheadedness and palpitations.  Patient explains that over the past few weeks she has had 1-2 episodes of heart palpitations associated with lightheadedness and feeling near faint, often last only a few minutes in duration.  She adamantly denies fall, injury, loss of consciousness or fainting spell.  However today she estimates she has had 5 or 6 such episodes, the longest episode lasting around 30 minutes in duration but still without loss of consciousness.  The lightheadedness was associated with palpitations as well as generalized weakness and fatigue described as severe.  Patient lives alone but believes she has been safe despite these episodes.  She denies recent fever, chills, chest pain, back pain, abdominal pain, nausea/vomiting, diarrhea, or other concerning symptoms.  She is certain she has been compliant with all of her prescription medications as they are mailed to her.  No active symptoms.  No known exacerbating or alleviating factors.  Of note, patient is prescribed atenolol for SVT and had a generator replacement of her single-chamber [employer] pacemaker on 11/14/12.    Problem List:    Patient Active Problem List    Diagnosis Date Noted     Near syncope 07/08/2018     Priority: Medium     Primary pulmonary hypertension (H) 08/26/2014     Priority: Medium     Hyperlipidemia LDL goal <70 11/12/2013     Priority: Medium     ACS (acute coronary syndrome) (H) 11/01/2013     Priority: Medium     Advanced directives, counseling/discussion 12/14/2011     Priority: Medium     12-13-11 \"No thank you, I don't full around with that, what " "happens happens\"       ET (essential thrombocythemia) (H) 04/20/2010     Priority: Medium     Vitamin D deficiency 01/29/2010     Priority: Medium     Hypothyroidism 01/11/2007     Priority: Medium     Problem list name updated by automated process. Provider to review       Generalized osteoarthrosis, unspecified site 01/11/2007     Priority: Medium     Other specified cardiac dysrhythmias(427.89) 12/28/2005     Priority: Medium     On her 6th pacemaker          Past Medical History:    Past Medical History:   Diagnosis Date     ACS (acute coronary syndrome) (H) 11/1/2013     ET (essential thrombocythemia) (H) 4/20/2010     Generalized osteoarthrosis, unspecified site      Hyperlipidemia LDL goal <70 11/12/2013     HYPOTHYROIDISM NOS 1/11/2007     Malignant neoplasm (H)      Other specified cardiac dysrhythmias(427.89)      Primary localized osteoarthrosis, lower leg      Qualitative platelet defects (H)      Thyrotoxicosis without mention of goiter or other cause, without mention of thyrotoxic crisis or storm      Unspecified cataract 5/2004     Unspecified cataract 6/2004     Vitamin D deficiency 1/29/2010       Past Surgical History:    Past Surgical History:   Procedure Laterality Date     CARDIAC CATHERIZATION  11/1/13    NSTEMI     COLONOSCOPY  12-19-95    Normal Colonosocpy with Normal Barium enema     SURGICAL HISTORY OF -       left cataract     SURGICAL HISTORY OF -       hemorrhoids     SURGICAL HISTORY OF -       left cataract     SURGICAL HISTORY OF -       right cataract     SURGICAL HISTORY OF -       hernia       Family History:    Family History   Problem Relation Age of Onset     Cancer Mother      uterine?     Cancer Brother      throat     Cancer Sister      in her 70s, unknown type     Diabetes Sister        Social History:  Marital Status:   [5]  Social History   Substance Use Topics     Smoking status: Former Smoker     Quit date: 12/28/1965     Smokeless tobacco: Never Used     Alcohol " use No        Medications:      No current outpatient prescriptions on file.      Review of Systems  Constitutional:  Negative for fever or recent illness.  Eye:  Negative for visual changes from baseline.  Cardiovascular: Positive for palpitations.  Negative for chest pain.  Respiratory:  Negative for cough or shortness of breath.  Gastrointestinal:  Negative for abdominal pain, nausea, vomiting, or diarrhea.  Baseline constipation.  Genitourinary:  Negative for dysuria.  Musculoskeletal:  Negative for neck pain, back pain, recent fall or injury.   Neurological: Positive for episodes of lightheadedness described as near fainting.  Negative for headache, sensory deficits or focal weakness.    All others reviewed and are negative.      Physical Exam   BP: 166/85  Heart Rate: 60  Temp: 98  F (36.7  C)  Resp: 18  SpO2: 99 %      Physical Exam  Constitutional:  Well developed, well nourished.  Appears nontoxic and in no acute distress.  Resting comfortably on the gurney.  HENT:  Normocephalic and atraumatic.  Symmetric in appearance.  Eyes:  Conjunctivae are normal.  Neck:  Neck supple.  Cardiovascular:  No cyanosis.  RRR.  No audible murmurs noted.  No lower extremity edema or asymmetry.   Respiratory:  Effort normal without sign of respiratory distress.  CTAB without diminished regions.   Gastrointestinal:  Soft, nondistended abdomen.  Nontender and without guarding.  No rigidity or rebound tenderness.  Negative Randolph's sign.  Negative McBurney's point.  No palpable pulsatile mass.   Genitourinary:  Noncontributory.   Musculoskeletal:  Moves extremities spontaneously.  Neurological:  Patient is alert.  Skin:  Skin is warm and dry.  Psychiatric:  Normal mood and affect.      ED Course     ED Course     Procedures                 EKG Interpretation:      Interpreted by: Dorian Saldivar  Time reviewed: Upon arrival     Symptoms at time of EKG: Asymptomatic   Rhythm: Suspect atrially paced   Rate: Normal at 60  bpm  Conduction: None atypical   ST Segments/ T Waves: No pathologic ST-elevations or T-wave abnormalities.  Q Waves: None  Comparison to prior: Similar morphology to previous     Clinical Impression: No sign of ischemia         Critical Care time:  none               Results for orders placed or performed during the hospital encounter of 07/07/18 (from the past 24 hour(s))   CBC with platelets differential   Result Value Ref Range    WBC 9.4 4.0 - 11.0 10e9/L    RBC Count 4.00 3.8 - 5.2 10e12/L    Hemoglobin 11.6 (L) 11.7 - 15.7 g/dL    Hematocrit 36.6 35.0 - 47.0 %    MCV 92 78 - 100 fl    MCH 29.0 26.5 - 33.0 pg    MCHC 31.7 31.5 - 36.5 g/dL    RDW 14.7 10.0 - 15.0 %    Platelet Count 1019 (HH) 150 - 450 10e9/L    Diff Method Automated Method     % Neutrophils 60.0 %    % Lymphocytes 24.3 %    % Monocytes 10.8 %    % Eosinophils 3.8 %    % Basophils 0.5 %    % Immature Granulocytes 0.6 %    Nucleated RBCs 0 0 /100    Absolute Neutrophil 5.6 1.6 - 8.3 10e9/L    Absolute Lymphocytes 2.3 0.8 - 5.3 10e9/L    Absolute Monocytes 1.0 0.0 - 1.3 10e9/L    Absolute Eosinophils 0.4 0.0 - 0.7 10e9/L    Absolute Basophils 0.1 0.0 - 0.2 10e9/L    Abs Immature Granulocytes 0.1 0 - 0.4 10e9/L    Absolute Nucleated RBC 0.0    Comprehensive metabolic panel   Result Value Ref Range    Sodium 135 133 - 144 mmol/L    Potassium 4.1 3.4 - 5.3 mmol/L    Chloride 101 94 - 109 mmol/L    Carbon Dioxide 28 20 - 32 mmol/L    Anion Gap 6 3 - 14 mmol/L    Glucose 139 (H) 70 - 99 mg/dL    Urea Nitrogen 20 7 - 30 mg/dL    Creatinine 0.96 0.52 - 1.04 mg/dL    GFR Estimate 55 (L) >60 mL/min/1.7m2    GFR Estimate If Black 66 >60 mL/min/1.7m2    Calcium 9.0 8.5 - 10.1 mg/dL    Bilirubin Total 0.3 0.2 - 1.3 mg/dL    Albumin 3.7 3.4 - 5.0 g/dL    Protein Total 7.5 6.8 - 8.8 g/dL    Alkaline Phosphatase 49 40 - 150 U/L    ALT 19 0 - 50 U/L    AST 28 0 - 45 U/L   Troponin I   Result Value Ref Range    Troponin I ES <0.015 0.000 - 0.045 ug/L   Magnesium    Result Value Ref Range    Magnesium 2.1 1.6 - 2.3 mg/dL   XR Chest 2 Views    Narrative    CHEST TWO VIEWS  7/7/2018 10:13 PM     HISTORY: Palpitations, lightheaded.     COMPARISON: 4/5/2018.      Impression    IMPRESSION: Pacemaker in the heart. Otherwise normal. No change.     GIOVANNA COLLINS MD       Medications   naloxone (NARCAN) injection 0.1-0.4 mg (not administered)   acetaminophen (TYLENOL) tablet 650 mg (not administered)   ondansetron (ZOFRAN-ODT) ODT tab 4 mg (not administered)     Or   ondansetron (ZOFRAN) injection 4 mg (not administered)   aspirin chewable tablet 162 mg (162 mg Oral Given 7/7/18 6512)       Assessments & Plan (with Medical Decision Making)   Maggie Baires is a 89 year old female who presented to the department for evaluation of intermittent episodes of palpitations with fatigue and lightheadedness described as near fainting.  She adamantly denies having suffered from syncopal episode or loss of consciousness.  The symptoms have been almost daily for the past few weeks but she has not discussed with primary provider or cardiologist.  The reason for her department arrival this evening is due to the increased frequency and duration of symptoms today.  She has had 5-6 episodes today and the longest lasting nearly 30 minutes.  By living alone, she has not fallen or suffered an injury.  Patient's EKG appears regular at rate of 60 bpm and likely consistent with atrial pacing although pacer spikes not obvious.  No sign of ischemia and troponin within reference range.  She remained comfortable throughout her stay in the department without recurrence of symptoms or identification of arrhythmia via telemetry.  Low suspicion for pulmonary embolism or ischemic stroke.  Representative from [employer] came to the department to interrogate patient's atrial pacemaker, there has been no atrial event since 7/2/18 which simply appeared to be a short 9 beat run of SVT.  However her single lead atrial  pacemaker is unable to monitor ventricular events.  Consulted on-call cardiologist Dr. Mcintyre of [facility] regarding patient's presenting symptoms as well as workup thus far.  He recommends observation telemetry admission at [facility] for monitoring, but if no arrhythmia or other identifiable source discovered then he would subsequently recommend outpatient cardiology clinic follow-up.  Hospitalist Dr. De La Vega has accepted ongoing care for the patient at this time.  No further recommendations.  Temporary transition orders were placed per hospital protocol to prevent any potential delay in patient care.    The patient and her daughter have been informed of her results and the recommendation for admission.  They have verbalized an understanding, all questions answered, and they are in agreement with the plan at this time.      Disclaimer:  This note consists of symbols derived from keyboarding, dictation, and/or voice recognition software.  As a result, there may be errors in the script that have gone undetected.  Please consider this when interpreting information found in the chart.        I have reviewed the nursing notes.    I have reviewed the findings, diagnosis, plan and need for follow up with the patient.       Current Discharge Medication List          Final diagnoses:   Palpitations   Lightheadedness   Near syncope       7/7/2018   [facility] EMERGENCY DEPARTMENT     Dorian Saldivar DO  07/08/18 0207

## 2018-07-08 NOTE — PROGRESS NOTES
This writer was present during the initial skin assessment and agrees with the documented findings.

## 2018-07-08 NOTE — PROGRESS NOTES
Patient denies dizzy/light headed/SOB/weakness this morning.   Daughter called to notify of discharge earlier this morning - message left. Daughter returned call and currently on her way to pick patient up for dc.

## 2018-07-08 NOTE — PROGRESS NOTES
[facility] ADMISSION NOTE    Patient admitted to room 2305 at approximately 0130 via cart from emergency room. Patient was accompanied by transport tech.     Verbal SBAR report received from Gaviota TAI in ER prior to patient arrival.     Patient ambulated to bed with one assist. Patient alert and oriented X 3. The patient is not having any pain.  . Admission vital signs: Blood pressure 155/63, temperature 97.9  F (36.6  C), temperature source Oral, resp. rate 16, SpO2 98 %, not currently breastfeeding. Patient was oriented to plan of care, call light, bed controls, tv, telephone, bathroom and visiting hours.     Risk Assessment    The following safety risks were identified during admission: fall. Yellow risk band applied: YES.     Skin Initial Assessment    This writer admitted this patient and completed a full skin assessment and Steven score in the Adult PCS flowsheet. Appropriate interventions initiated as needed.     Secondary skin check completed by Cecelia Molina RN.    Skin  Inspection of bony prominences: Full  Skin WDL:  WDL except, characteristics  Skin Temperature: warm  Skin Moisture: dry  Skin Elasticity: quick return to original state  Skin Integrity: bruise(s), scar(s)    Steven Risk Assessment  Sensory Perception: 4-->no impairment  Moisture: 3-->occasionally moist  Activity: 3-->walks occasionally  Mobility: 3-->slightly limited  Nutrition: 3-->adequate  Friction and Shear: 3-->no apparent problem  Steven Score: 19    Dian Kahn RN

## 2018-07-08 NOTE — ED NOTES
Bed: ED02  Expected date:   Expected time:   Means of arrival: Ambulance  Comments:  Weakness paplitations

## 2018-07-08 NOTE — ED NOTES
DATE:  7/7/2018   TIME OF RECEIPT FROM LAB:  2232  LAB TEST:  plts  LAB VALUE:  1019  RESULTS GIVEN WITH READ-BACK TO (PROVIDER):  Dr. Saldivar  TIME LAB VALUE REPORTED TO PROVIDER:   0717

## 2018-07-09 NOTE — TELEPHONE ENCOUNTER
ED/UC/IP follow up phone call: [facility] discharge 7/8/18  Palpitations    RN please call to follow up.    Number of ED visits in past 12 months = 1

## 2018-07-09 NOTE — MR AVS SNAPSHOT
After Visit Summary   7/9/2018    Maggie Baires    MRN: [id number]           Patient Information     Date Of Birth          1/10/1928        Visit Information        Provider Department      7/9/2018 11:30 AM Annabel Pelaez MD [facility]        Today's Diagnoses     MGUS (monoclonal gammopathy of unknown significance)    -  1    ET (essential thrombocythemia) (H)        Anemia, unspecified type          Care Instructions    Dr. Pelaez would like you to hold baby Asprin and increase Agrylin. We would like you to have weekly labs for the next month and will call you with results and plan for follow up.        A prescription has been sent to [id number] - [place], [place] - [address]  When you are in need of a refill, please call your pharmacy and they will send us a request.      Copy of appointments, and after visit summary (AVS) given to patient.      If you have any questions please call Violeta Soliz RN, BSN Oncology Hematology  [facility] [phone]. For questions after business hours, or on holidays/weekends, please call our after hours Nurse Triage line [phone]. Thank you.           Hold baby ASA; increase Agrylin, wkly cbc diff x 4.           Follow-ups after your visit        Your next 10 appointments already scheduled     Jul 16, 2018 10:15 AM CDT   LAB with NB LAB   [facility]    [address] [place] [address] [phone]           Please do not eat 10-12 hours before your appointment if you are coming in fasting for labs on lipids, cholesterol, or glucose (sugar). This does not apply to pregnant women. Water, hot tea and black coffee (with nothing added) are okay. Do not drink other fluids, diet soda or chew gum.            Jul 23, 2018 10:30 AM CDT   LAB with NB LAB   [facility]    [address] [place] [address]  [facility] [address]   [phone]           Please do not eat 10-12 hours before your appointment if you are coming in fasting for labs on lipids, cholesterol, or glucose (sugar). This does not apply to pregnant women. Water, hot tea and black coffee (with nothing added) are okay. Do not drink other fluids, diet soda or chew gum.            Jul 30, 2018 10:30 AM CDT   LAB with NB LAB   [facility]    [address] [place] [address]   [phone]           Please do not eat 10-12 hours before your appointment if you are coming in fasting for labs on lipids, cholesterol, or glucose (sugar). This does not apply to pregnant women. Water, hot tea and black coffee (with nothing added) are okay. Do not drink other fluids, diet soda or chew gum.            Aug 06, 2018 10:15 AM CDT   LAB with NB LAB   [facility]    [address] [place] [address]   [phone]           Please do not eat 10-12 hours before your appointment if you are coming in fasting for labs on lipids, cholesterol, or glucose (sugar). This does not apply to pregnant women. Water, hot tea and black coffee (with nothing added) are okay. Do not drink other fluids, diet soda or chew gum.            Sep 11, 2018  1:00 PM CDT   Pacemaker Check with Wy Device Rn   [facility] ([facility])    [address] [facility] [address]   [phone]              Future tests that were ordered for you today     Open Standing Orders        Priority Remaining Interval Expires Ordered    CBC with platelets differential Routine 4/4 weekly x 4 7/9/2019 7/9/2018            Who to contact     If you have questions or need follow up information about today's clinic visit or your schedule please contact [facility] directly at [phone].  Normal or non-critical lab and imaging results will be  "communicated to you by MyChart, letter or phone within 4 business days after the clinic has received the results. If you do not hear from us within 7 days, please contact the clinic through MyChart or phone. If you have a critical or abnormal lab result, we will notify you by phone as soon as possible.  Submit refill requests through [employer] or call your pharmacy and they will forward the refill request to us. Please allow 3 business days for your refill to be completed.          Additional Information About Your Visit        Care EveryWhere ID     This is your Care EveryWhere ID. This could be used by other organizations to access your [place] medical records  [id number]        Your Vitals Were     Pulse Temperature Respirations Height Pulse Oximetry Breastfeeding?    79 97.7  F (36.5  C) (Tympanic) 16 1.6 m (5' 2.99\") 96% No    BMI (Body Mass Index)                   18.43 kg/m2            Blood Pressure from Last 3 Encounters:   07/09/18 145/64   07/08/18 164/62   04/25/18 146/68    Weight from Last 3 Encounters:   07/09/18 47.2 kg (104 lb)   07/08/18 47.4 kg (104 lb 8 oz)   04/25/18 47.4 kg (104 lb 6.4 oz)                 Today's Medication Changes          These changes are accurate as of 7/9/18 11:47 AM.  If you have any questions, ask your nurse or doctor.               These medicines have changed or have updated prescriptions.        Dose/Directions    AGRYLIN 0.5 MG capsule   This may have changed:  additional instructions   Used for:  ET (essential thrombocythemia) (H)   Generic drug:  anagrelide   Changed by:  Annabel Pelaez MD        Take 1 capsule 0.5 mg po qd Sat/sun,  2 capsules (1 mg) po qd Mon -Fri   Quantity:  50 capsule   Refills:  3            Where to get your medicines      These medications were sent to [id number] - [place], [place] - [address] [facility]  [address] [facility] [address]    Hours:  Closed 10-16-08 business to [facility] Phone:  [phone]     AGRYLIN " 0.5 MG capsule                Primary Care Provider Office Phone # Fax #    Dorian Jensen [phone]       [address] [place] [address]        Equal Access to Services     NICOLASA REYES : Alonso hood danika Solorena, waaxda luqadaha, qaybta kaalmada adejesúsyada, liam colon kofi marx. So [facility] [phone].    ATENCIÓN: Si habla español, tiene a singleton disposición servicios gratuitos de asistencia lingüística. [facility] [phone].    We comply with applicable federal civil rights laws and [place] laws. We do not discriminate on the basis of race, color, national origin, age, disability, sex, sexual orientation, or gender identity.            Thank you!     Thank you for choosing [facility]  for your care. Our goal is always to provide you with excellent care. Hearing back from our patients is one way we can continue to improve our services. Please take a few minutes to complete the written survey that you may receive in the mail after your visit with us. Thank you!             Your Updated Medication List - Protect others around you: Learn how to safely use, store and throw away your medicines at www.disposemymeds.org.          This list is accurate as of 7/9/18 11:47 AM.  Always use your most recent med list.                   Brand Name Dispense Instructions for use Diagnosis    AGRYLIN 0.5 MG capsule   Generic drug:  anagrelide     50 capsule    Take 1 capsule 0.5 mg po qd Sat/sun,  2 capsules (1 mg) po qd Mon -Fri    ET (essential thrombocythemia) (H)       aspirin 81 MG tablet     30 tablet    Take by mouth daily        atenolol 25 MG tablet    TENORMIN    30 tablet    Take 1 tablet (25 mg) by mouth daily    ACS (acute coronary syndrome) (H)       hypromellose 0.5 % Soln ophthalmic solution    ARTIFICIAL TEARS     Place 1 drop into both eyes 4 times daily as needed        levothyroxine 75 MCG tablet    SYNTHROID/LEVOTHROID    90 tablet     TAKE ONE TABLET BY MOUTH ONE TIME DAILY    Hypothyroidism, unspecified type       VITAMIN D (CHOLECALCIFEROL) PO      Take 2,000 Units by mouth daily

## 2018-07-09 NOTE — PATIENT INSTRUCTIONS
Dr. Pelaez would like you to hold baby Asprin and increase Agrylin. We would like you to have weekly labs for the next month and will call you with results and plan for follow up.        A prescription has been sent to [id number] [place] [id number] [place]  When you are in need of a refill, please call your pharmacy and they will send us a request.      Copy of appointments, and after visit summary (AVS) given to patient.      If you have any questions please call Violeta Soliz RN, BSN Oncology Hematology  [facility] [phone]. For questions after business hours, or on holidays/weekends, please call our after hours Nurse Triage line [phone]. Thank you.           Hold baby ASA; increase Agrylin, wkly cbc diff x 4.

## 2018-07-09 NOTE — TELEPHONE ENCOUNTER
"ED/Discharge Protocol    \"Hi, my name is Kristi Warren, a registered nurse, and I am calling on behalf of Dr. Jensen's office at [place].  I am calling to follow up and see how things are going for you after your recent visit.\"    \"I see that you were in the (ER/UC/IP) on 7-7-18.    How are you doing now that you are home?\" good, but a little tired    Is patient experiencing symptoms that may require a hospital visit?  no    Discharge Instructions    \"Let's review your discharge instructions.  What is/are the follow-up recommendations?  Pt. Response: follow up with Dr Pelaez    \"Were you instructed to make a follow-up appointment?\"  Pt. Response: Yes.  Has appointment been made?   Yes      \"When you see the provider, I would recommend that you bring your discharge instructions with you.    Medications    \"How many new medications are you on since your hospitalization/ED visit?\"    0-1  \"How many of your current medicines changed (dose, timing, name, etc.) while you were in the hospital/ED visit?\"   0-1  \"Do you have questions about your medications?\"   No  \"Were you newly diagnosed with heart failure, COPD, diabetes or did you have a heart attack?\"   No  For patients on insulin: \"Did you start on insulin in the hospital or did you have your insulin dose changed?\"   No    Medication reconciliation completed? No    Was MTM referral placed (*Make sure to put transitions as reason for referral)?   No    Call Summary    \"Do you have any questions or concerns about your condition or care plan at the moment?\"    No  Triage nurse advice given: call if questions    Patient was in ER 1 in the past year (assess appropriateness of ER visits.)      \"If you have questions or things don't continue to improve, we encourage you contact us through the main clinic number,  [phone].  Even if the clinic is not open, triage nurses are available 24/7 to help you.     We would like you to know that our clinic has extended " "hours (provide information).  We also have urgent care (provide details on closest location and hours/contact info)\"      \"Thank you for your time and take care!\"        "

## 2018-07-09 NOTE — PROGRESS NOTES
CHIEF COMPLAINT AND REASON FOR VISIT:   ET on anagrelide,   IgG kappa M protein     HISTORY OF PRESENT ILLNESS: She was diagnosed with essential thrombocytosis since 2004. She has been getting anagrelide treatment for years and her platelet count has been fluctuating between 1.3 million to around 500. She tolerates anagrelide fair. She had extreme thrombocytosis.   She was on anagrelide 1.5 mg a day, 2 of the 0.5 mg tablets in the morning, 0.5 mg tabletsin the evening, this was started mid 10/20/2009. The dose was adjusted prior to that. She has no thrombosis history. Then dose of anagrelide is 1 mg p.o. q.a.m. and 0.5 mg p.o. q.p.m. Monday through Friday and 0.5 mg p.o. bid Saturday and Sunday.   The dose was again reduced in 10/2013 due to dropping PL so she is on 0.5 mg po bid. She refuses to change to other med since it has been working well for her. She starts to have anemia in 2016. Does has been adjusted due to anemia and thrombocytosis.    Also accidental  M IgG kappa protein 0.3 gram in 2009, has MGUS and is on follow up.     OTHER MEDICAL PROBLEMS: Hypothyroidism, osteoarthritis, ET, cardiac dysrhythmia (SSS), pacemaker years ago, cataract. P. HTN. CAD in 11/2013, heart attack 8/2014    MEDICATIONS: Reviewed in Epic system.     ALLERGIES: Caliea had extensive skin reaction.      SOCIAL HISTORY: Retired, very active lady. Lives in [place].  She lives in an apartment. She is . Her daughter is close by.     REVIEW OF SYSTEMS:   She is in her usual state of health.   she is faithfully taking anagrelide.   She denied any bleeding episode, any thrombosis episode, any paresthesia of her fingertips and toes, any bone pain, constipation.   She volunteers in the community.   She reports dizziness when she changes position occasionally.     PHYSICAL EXAMINATION:   VITAL SIGNS: Blood pressure 145/64, pulse 79, temperature 97.7  F (36.5  C), temperature source Tympanic, resp. rate 16, height 1.6 m  "(5' 2.99\"), weight 47.2 kg (104 lb), SpO2 96 %, not currently breastfeeding.  GENERAL APPEARANCE: Elderly lady, looks much younger than her stated age, not in acute distress. She always looks very thin but healthy.   EXTREMITIES: Multiple varicose veins and joints deformity.  HEENT: The patient is normocephalic, atraumatic. Pupils are equal react to light. Sclerae are anicteric. Moist oral mucosa. Negative pharynx. No oral thrush.   NECK: Supple. No jugular venous distention. Thyroid is not palpable.   LYMPH NODES: Superficial lymphadenopathy is not appreciable in the bilateral cervical, supraclavicular, axillary or inguinal adenopathy.   CARDIOVASCULAR: S1, S2 regular with no murmurs or gallops. No carotid or abdominal bruits. Left upper CW pacemaker.   PULMONARY: Lungs are clear to auscultation and percussion bilaterally. There is no wheezing or rhonchi.   GASTROINTESTINAL: Abdomen is soft, nontender. No hepatosplenomegaly. No signs of ascites. No mass appreciable.   NEUROLOGIC: Cranial nerves II-XII are grossly intact. Sensation intact. Muscle strength and muscle tone symmetrical all through 5/5.   BACK: No spinal or paraspinal tenderness. No CVA tenderness.   SKIN: bulging erythematous rash on nasol upper bridge.    CURRENT LAB DATA REVIEWED  Hb 11.6 from 10.8 from 11.4, wbc/diff nl,PL > 1019 from 950,   500 + from  900+ from 700-800  CMP is fine.  spep 0.2 in 7/2018, 0.3 in 3/2018 from 0.2 from 0.3   LCA 2.34 in 7/2018,  nl in 3/2018    Current Image Data reviewed  CT a/p 4/2018 - no acute pathology.     OLD DATA REVIEW IN SUMMARY:  US 8/2017 - nl liver and spleen size.     Serum protein electrophoresis 0.3 in 4/2016, in 10/2015 stabilized 0.2 gm stable     Immunofixation indicating 0.3 gm IgG kappa in 10/2009. She was thrombocytopenic in the later part of 2009, platelets between 100 and 130 when anagrelide dose was adjusted.     ASSESSMENT AND PLAN:   1. Essential thrombocythemia (ET) since 2004. She has been " anagrelide for yrs, dose has been adjusted due to anemia and thrombocytosis.   Due to increased PL and improved Hb, she is advised to try 0.5 g s/S, and do and 1 g qd on Mon -Fri.   She is advised to hold baby ASA till further notice with her wkly cbc diff.     She understand ET is one form of myeloproliferative disease and she needs to be watched for transforming into leukemia and she needs to be on this platelet agent for the rest of her life and her disease may change. She needs to see hematology for the rest of her life.     2. Monoclonal gammopathy of unknown significance (MGUS).   Monitor her for end organ function and at this point.    3.normacytic anemia new in 2016. Likely anagrelide side effects.   We discussed the proper diet. Will check her nutrition status.

## 2018-07-09 NOTE — LETTER
7/9/2018         RE: Maggie Baires  [address]  [facility] [address]        Dear Colleague,    Thank you for referring your patient, Maggie Baires, to the [facility]. Please see a copy of my visit note below.    CHIEF COMPLAINT AND REASON FOR VISIT:   ET on anagrelide,   IgG kappa M protein     HISTORY OF PRESENT ILLNESS: She was diagnosed with essential thrombocytosis since 2004. She has been getting anagrelide treatment for years and her platelet count has been fluctuating between 1.3 million to around 500. She tolerates anagrelide fair. She had extreme thrombocytosis.   She was on anagrelide 1.5 mg a day, 2 of the 0.5 mg tablets in the morning, 0.5 mg tabletsin the evening, this was started mid 10/20/2009. The dose was adjusted prior to that. She has no thrombosis history. Then dose of anagrelide is 1 mg p.o. q.a.m. and 0.5 mg p.o. q.p.m. Monday through Friday and 0.5 mg p.o. bid Saturday and Sunday.   The dose was again reduced in 10/2013 due to dropping PL so she is on 0.5 mg po bid. She refuses to change to other med since it has been working well for her. She starts to have anemia in 2016. Does has been adjusted due to anemia and thrombocytosis.    Also accidental  M IgG kappa protein 0.3 gram in 2009, has MGUS and is on follow up.     OTHER MEDICAL PROBLEMS: Hypothyroidism, osteoarthritis, ET, cardiac dysrhythmia (SSS), pacemaker years ago, cataract. P. HTN. CAD in 11/2013, heart attack 8/2014    MEDICATIONS: Reviewed in Epic system.     ALLERGIES: Hydrea had extensive skin reaction.      SOCIAL HISTORY: Retired, very active lady. Lives in [place].  She lives in an apartment. She is . Her daughter is close by.     REVIEW OF SYSTEMS:   She is in her usual state of health.   she is faithfully taking anagrelide.   She denied any bleeding episode, any thrombosis episode, any paresthesia of her fingertips and toes, any bone pain, constipation.   She volunteers in the  "community.   She reports dizziness when she changes position occasionally.     PHYSICAL EXAMINATION:   VITAL SIGNS: Blood pressure 145/64, pulse 79, temperature 97.7  F (36.5  C), temperature source Tympanic, resp. rate 16, height 1.6 m (5' 2.99\"), weight 47.2 kg (104 lb), SpO2 96 %, not currently breastfeeding.  GENERAL APPEARANCE: Elderly lady, looks much younger than her stated age, not in acute distress. She always looks very thin but healthy.   EXTREMITIES: Multiple varicose veins and joints deformity.  HEENT: The patient is normocephalic, atraumatic. Pupils are equal react to light. Sclerae are anicteric. Moist oral mucosa. Negative pharynx. No oral thrush.   NECK: Supple. No jugular venous distention. Thyroid is not palpable.   LYMPH NODES: Superficial lymphadenopathy is not appreciable in the bilateral cervical, supraclavicular, axillary or inguinal adenopathy.   CARDIOVASCULAR: S1, S2 regular with no murmurs or gallops. No carotid or abdominal bruits. Left upper CW pacemaker.   PULMONARY: Lungs are clear to auscultation and percussion bilaterally. There is no wheezing or rhonchi.   GASTROINTESTINAL: Abdomen is soft, nontender. No hepatosplenomegaly. No signs of ascites. No mass appreciable.   NEUROLOGIC: Cranial nerves II-XII are grossly intact. Sensation intact. Muscle strength and muscle tone symmetrical all through 5/5.   BACK: No spinal or paraspinal tenderness. No CVA tenderness.   SKIN: bulging erythematous rash on nasol upper bridge.    CURRENT LAB DATA REVIEWED  Hb 11.6 from 10.8 from 11.4, wbc/diff nl,PL > 1019 from 950,   500 + from  900+ from 700-800  CMP is fine.  spep 0.2 in 7/2018, 0.3 in 3/2018 from 0.2 from 0.3   LCA 2.34 in 7/2018,  nl in 3/2018    Current Image Data reviewed  CT a/p 4/2018 - no acute pathology.     OLD DATA REVIEW IN SUMMARY:  US 8/2017 - nl liver and spleen size.     Serum protein electrophoresis 0.3 in 4/2016, in 10/2015 stabilized 0.2 gm stable     Immunofixation " indicating 0.3 gm IgG kappa in 10/2009. She was thrombocytopenic in the later part of 2009, platelets between 100 and 130 when anagrelide dose was adjusted.     ASSESSMENT AND PLAN:   1. Essential thrombocythemia (ET) since 2004. She has been anagrelide for yrs, dose has been adjusted due to anemia and thrombocytosis.   Due to increased PL and improved Hb, she is advised to try 0.5 g s/S, and do and 1 g qd on Mon -Fri.   She is advised to hold baby ASA till further notice with her wkly cbc diff.     She understand ET is one form of myeloproliferative disease and she needs to be watched for transforming into leukemia and she needs to be on this platelet agent for the rest of her life and her disease may change. She needs to see hematology for the rest of her life.     2. Monoclonal gammopathy of unknown significance (MGUS).   Monitor her for end organ function and at this point.    3.normacytic anemia new in 2016. Likely anagrelide side effects.   We discussed the proper diet. Will check her nutrition status.     Again, thank you for allowing me to participate in the care of your patient.        Sincerely,        Annabel Pelaez MD, MD

## 2018-07-16 NOTE — PROGRESS NOTES
Called and reviewed results with the patient per Dr. Pelaez. Patient had no further questions or concerns at this time and also verbalized understanding. Direct line provided if any further questions/concerns arise.

## 2018-08-28 NOTE — TELEPHONE ENCOUNTER
Dr. Pelaez reviewed pt lab results, continue to look good and be stable. Pt is to have labs done monthly. Orders have been updated.     Updated pt, pt will call and make her next lab appt.     Violeta Soliz RN on 8/28/2018 at 12:39 PM

## 2018-09-11 NOTE — PROGRESS NOTES
[employer] (AAI) Pacemaker Device Check/ [place]  AP: 88 %   Mode: AAIR        Underlying Rhythm: SB, rate in the 40's  Heart Rate: Histogram is stable and adequate; no complaints  Sensing: Stable    Pacing Threshold: Stable   Impedance: WNL  Battery Status: 9-10 years estimated longevity  Device Site: No issues  Atrial Arrhythmia: none  Ventricular Arrhythmia: 2 VHR episodes; appear to be PAT  < 10 seconds  Setting Change: NONE    Care Plan: Home teletrace in 3 months. sml

## 2018-09-11 NOTE — ADDENDUM NOTE
Encounter addended by: Lora Correa on: 9/11/2018  2:38 PM<BR>     Actions taken:  activity accessed, Charge Capture section accepted

## 2018-10-04 NOTE — PATIENT INSTRUCTIONS
1. Lets keep the Dr SANCHEZ appointment    2. I think the legs are muscle cramps.    3. Lets on meds for that now.    4. Call if not better.

## 2018-10-04 NOTE — MR AVS SNAPSHOT
After Visit Summary   10/4/2018    Maggie Baires    MRN: [id number]           Patient Information     Date Of Birth          1/10/1928        Visit Information        Provider Department      10/4/2018 1:20 PM Dorian Jensen MD [facility]        Care Instructions    1. Lets keep the Dr SANCHEZ appointment    2. I think the legs are muscle cramps.    3. Lets on meds for that now.    4. Call if not better.          Follow-ups after your visit        Your next 10 appointments already scheduled     Nov 05, 2018  1:40 PM CST   LAB with [facility] ([facility])    [address] [address]   [phone]           Please do not eat 10-12 hours before your appointment if you are coming in fasting for labs on lipids, cholesterol, or glucose (sugar). This does not apply to pregnant women. Water, hot tea and black coffee (with nothing added) are okay. Do not drink other fluids, diet soda or chew gum.            Nov 05, 2018  2:15 PM CST   Return Visit with Annabel Pelaez MD   [facility] ([facility])    [facility]  [address]  [facility] [address]   [phone]            Dec 12, 2018  9:00 AM CST   30 Day Phone Check with [place] [facility]   [facility] ([facility])    [address] [address]   [phone]              Who to contact     If you have questions or need follow up information about today's clinic visit or your schedule please contact [facility] directly at [phone].  Normal or non-critical lab and imaging results will be communicated to you by MyChart, letter or phone within 4 business days after the clinic has received the results. If you do not hear from us within 7 days, please contact the clinic through MyChart or phone. If you have a critical or abnormal lab result, we  will notify you by phone as soon as possible.  Submit refill requests through [employer] or call your pharmacy and they will forward the refill request to us. Please allow 3 business days for your refill to be completed.          Additional Information About Your Visit        Care EveryWhere ID     This is your Care EveryWhere ID. This could be used by other organizations to access your [place] medical records  [id number]        Your Vitals Were     Pulse Temperature Respirations BMI (Body Mass Index)          72 99.3  F (37.4  C) (Tympanic) 16 17.93 kg/m2         Blood Pressure from Last 3 Encounters:   10/04/18 142/62   07/09/18 145/64   07/08/18 164/62    Weight from Last 3 Encounters:   10/04/18 101 lb 3.2 oz (45.9 kg)   07/09/18 104 lb (47.2 kg)   07/08/18 104 lb 8 oz (47.4 kg)              Today, you had the following     No orders found for display       Primary Care Provider Office Phone # Fax #    Dorian Jensen [phone]       [address] [place] [address]        Equal Access to Services     [facility] : Hadii le ku hadasho Solaquitaali, waaxda luqadaha, qaybta kaalmada ademichael, liam kirby . So [facility] [phone].    ATENCIÓN: Si habla español, tiene a singleton disposición servicios gratuitos de asistencia lingüística. [facility] [phone].    We comply with applicable federal civil rights laws and [place] laws. We do not discriminate on the basis of race, color, national origin, age, disability, sex, sexual orientation, or gender identity.            Thank you!     Thank you for choosing [facility]  for your care. Our goal is always to provide you with excellent care. Hearing back from our patients is one way we can continue to improve our services. Please take a few minutes to complete the written survey that you may receive in the mail after your visit with us. Thank you!             Your Updated Medication List -  Protect others around you: Learn how to safely use, store and throw away your medicines at www.disposemymeds.org.          This list is accurate as of 10/4/18  1:51 PM.  Always use your most recent med list.                   Brand Name Dispense Instructions for use Diagnosis    AGRYLIN 0.5 MG capsule   Generic drug:  anagrelide     50 capsule    Take 1 capsule 0.5 mg po qd Sat/sun,  2 capsules (1 mg) po qd Mon -Fri    ET (essential thrombocythemia) (H)       aspirin 81 MG tablet     30 tablet    Take by mouth daily        atenolol 25 MG tablet    TENORMIN    30 tablet    Take 1 tablet (25 mg) by mouth daily    ACS (acute coronary syndrome) (H)       hypromellose 0.5 % Soln ophthalmic solution    ARTIFICIAL TEARS     Place 1 drop into both eyes 4 times daily as needed        levothyroxine 75 MCG tablet    SYNTHROID/LEVOTHROID    90 tablet    TAKE ONE TABLET BY MOUTH ONE TIME DAILY    Hypothyroidism, unspecified type       VITAMIN D (CHOLECALCIFEROL) PO      Take 2,000 Units by mouth daily

## 2018-10-04 NOTE — NURSING NOTE
"Chief Complaint   Patient presents with     Depression       Initial /62 (BP Location: Right arm, Cuff Size: Adult Regular)  Pulse 72  Temp 99.3  F (37.4  C) (Tympanic)  Resp 16  Wt 101 lb 3.2 oz (45.9 kg)  BMI 17.93 kg/m2 Estimated body mass index is 17.93 kg/(m^2) as calculated from the following:    Height as of 7/9/18: 5' 2.99\" (1.6 m).    Weight as of this encounter: 101 lb 3.2 oz (45.9 kg).    Patient presents to the clinic using No DME    Health Maintenance that is potentially due pending provider review:  NONE    n/a    Is there anyone who you would like to be able to receive your results? Not Applicable  If yes have patient fill out DILAN  Rosamaria Espana M.A.        "

## 2018-10-04 NOTE — PROGRESS NOTES
SUBJECTIVE:   Maggie Baires is a 89 year old female who presents to clinic today for the following health issues:    Concern -Grief adjustment   HERE AFTER LOSE OF SON.   Onset: couple weeks     Description:   Patients son passed away and has been hard on her.      Intensity: mild    Progression of Symptoms:  same    Accompanying Signs & Symptoms:  Na      Previous history of similar problem:   Na     Precipitating factors:   Worsened by: na     Alleviating factors:  Improved by: na     Therapies Tried and outcome: none    Muscle weakness       Duration: couple weeks     Description (location/character/radiation): Has noticied weakness in legs and pain in toes.  Slightly unsteady on feet, as needs to touch wall while walking.      Intensity:  mild    Accompanying signs and symptoms: none    History (similar episodes/previous evaluation): Has fallen previously a few times in last 6 months     Precipitating or alleviating factors: Just had to drive to [place] for sons     Therapies tried and outcome: None     Discuss patient driving.     Problem list and histories reviewed & adjusted, as indicated.  Additional history: as documented    Patient Active Problem List   Diagnosis     Other specified cardiac dysrhythmias(427.89)     Hypothyroidism     Generalized osteoarthrosis, unspecified site     Vitamin D deficiency     ET (essential thrombocythemia) (H)     Advanced directives, counseling/discussion     ACS (acute coronary syndrome) (H)     Hyperlipidemia LDL goal <70     Primary pulmonary hypertension (H)     Near syncope     Past Surgical History:   Procedure Laterality Date     CARDIAC CATHERIZATION  13    NSTEMI     COLONOSCOPY  89    Normal Colonosocpy with Normal Barium enema     SURGICAL HISTORY OF -       left cataract     SURGICAL HISTORY OF -       hemorrhoids     SURGICAL HISTORY OF -       left cataract     SURGICAL HISTORY OF -       right cataract     SURGICAL HISTORY OF -       hernia        Social History   Substance Use Topics     Smoking status: Former Smoker     Quit date: 12/28/1965     Smokeless tobacco: Never Used     Alcohol use No     Family History   Problem Relation Age of Onset     Cancer Mother      uterine?     Cancer Brother      throat     Cancer Sister      in her 70s, unknown type     Diabetes Sister          Current Outpatient Prescriptions   Medication Sig Dispense Refill     AGRYLIN 0.5 MG capsule Take 1 capsule 0.5 mg po qd Sat/sun,  2 capsules (1 mg) po qd Mon -Fri 50 capsule 3     aspirin 81 MG tablet Take by mouth daily 30 tablet 0     atenolol (TENORMIN) 25 MG tablet Take 1 tablet (25 mg) by mouth daily 30 tablet 3     hypromellose (ARTIFICIAL TEARS) 0.5 % SOLN Place 1 drop into both eyes 4 times daily as needed       levothyroxine (SYNTHROID/LEVOTHROID) 75 MCG tablet TAKE ONE TABLET BY MOUTH ONE TIME DAILY 90 tablet 3     VITAMIN D, CHOLECALCIFEROL, PO Take 2,000 Units by mouth daily        Allergies   Allergen Reactions     Hydrea [Hydroxyurea] Rash       Reviewed and updated as needed this visit by clinical staff       Reviewed and updated as needed this visit by Provider         ROS:  CONSTITUTIONAL: NEGATIVE for fever, chills, change in weight  ENT/MOUTH: NEGATIVE for ear, mouth and throat problems  RESP: NEGATIVE for significant cough or SOB  CV: NEGATIVE for chest pain, palpitations or peripheral edema    OBJECTIVE:     /62 (BP Location: Right arm, Cuff Size: Adult Regular)  Pulse 72  Temp 99.3  F (37.4  C) (Tympanic)  Resp 16  Wt 101 lb 3.2 oz (45.9 kg)  BMI 17.93 kg/m2  Body mass index is 17.93 kg/(m^2).  GENERAL: healthy, alert and no distress  NECK: no adenopathy, no asymmetry, masses, or scars and thyroid normal to palpation  RESP: lungs clear to auscultation - no rales, rhonchi or wheezes  CV: regular rate and rhythm, normal S1 S2, no S3 or S4, no murmur, click or rub, no peripheral edema and peripheral pulses strong  ABDOMEN: soft, nontender, no  hepatosplenomegaly, no masses and bowel sounds normal  MS: no gross musculoskeletal defects noted, no edema        ASSESSMENT/PLAN:     ASSESSMENT/PLAN:      ICD-10-CM    1. ET (essential thrombocythemia) (H) D47.3    2. Grief reaction F43.21        Patient Instructions   1. Lets keep the Dr SANCHEZ appointment    2. I think the legs are muscle cramps.    3. Lets on meds for that now.    4. Call if not better.              There are no diagnoses linked to this encounter.        Dorian Jensen MD  [facility]

## 2018-10-07 NOTE — TELEPHONE ENCOUNTER
"Requested Prescriptions   Pending Prescriptions Disp Refills     atenolol (TENORMIN) 25 MG tablet   Last Written Prescription Date:  6/4/18  Last Fill Quantity: 30,  # refills: 3   Last office visit: 10/4/2018 with prescribing provider:  CJ Jensen   Future Office Visit:   Next 5 appointments (look out 90 days)     Nov 05, 2018  2:15 PM CST   Return Visit with Annabel Pelaez MD   [facility] ([facility])    [facility]  [address]  [facility] [address]   [phone]                  30 tablet 2     Sig: TAKE ONE TABLET BY MOUTH ONE TIME DAILY    Beta-Blockers Protocol Failed    10/6/2018 11:38 AM       Failed - Blood pressure under 140/90 in past 12 months    BP Readings from Last 3 Encounters:   10/04/18 142/62   07/09/18 145/64   07/08/18 164/62                Passed - Patient is age 6 or older       Passed - Recent (12 mo) or future (30 days) visit within the authorizing provider's specialty    Patient had office visit in the last 12 months or has a visit in the next 30 days with authorizing provider or within the authorizing provider's specialty.  See \"Patient Info\" tab in inbasket, or \"Choose Columns\" in Meds & Orders section of the refill encounter.              "

## 2018-11-05 NOTE — LETTER
11/5/2018         RE: Maggie Baires  [address]  [place] [place] [facility] [place] [address]        Dear Colleague,    Thank you for referring your patient, Maggie Baires, to the [facility]. Please see a copy of my visit note below.    CHIEF COMPLAINT AND REASON FOR VISIT:   ET on anagrelide,   IgG kappa M protein     HISTORY OF PRESENT ILLNESS: She was diagnosed with essential thrombocytosis since 2004. She has been getting anagrelide treatment for years and her platelet count has been fluctuating between 1.3 million to around 500. She tolerates anagrelide fair. She had extreme thrombocytosis.   She was on anagrelide 1.5 mg a day, 2 of the 0.5 mg tablets in the morning, 0.5 mg tabletsin the evening, this was started mid 10/20/2009. The dose was adjusted prior to that. She has no thrombosis history. Then dose of anagrelide is 1 mg p.o. q.a.m. and 0.5 mg p.o. q.p.m. Monday through Friday and 0.5 mg p.o. bid Saturday and Sunday.   The dose was again reduced in 10/2013 due to dropping PL so she is on 0.5 mg po bid. She refuses to change to other med since it has been working well for her. She starts to have anemia in 2016. Does has been adjusted due to anemia and thrombocytosis.    Also accidental  M IgG kappa protein 0.3 gram in 2009, has MGUS and is on follow up.     OTHER MEDICAL PROBLEMS: Hypothyroidism, osteoarthritis, ET, cardiac dysrhythmia (SSS), pacemaker years ago, cataract. P. HTN. CAD in 11/2013, heart attack 8/2014    MEDICATIONS: Reviewed in Epic system.     ALLERGIES: Hydrea had extensive skin reaction.      SOCIAL HISTORY: Retired, very active lady. Lives in [place].  She lives in an apartment. She is . Her daughter is close by.     REVIEW OF SYSTEMS:   She has URI and pain in her legs.   she is faithfully taking anagrelide.   She denied any bleeding episode, any thrombosis episode, any paresthesia of her fingertips and toes, any bone pain, constipation.   She volunteers in the  "community.   She reports dizziness when she changes position occasionally.     PHYSICAL EXAMINATION:   VITAL SIGNS: Blood pressure 139/57, pulse 61, temperature 98.8  F (37.1  C), temperature source Tympanic, resp. rate 16, height 1.588 m (5' 2.5\"), weight 45.9 kg (101 lb 3.2 oz), SpO2 99 %, not currently breastfeeding.   ECOG 0.     GENERAL APPEARANCE: Elderly lady, looks much younger than her stated age, not in acute distress. She always looks very thin but healthy.   EXTREMITIES: Multiple varicose veins and joints deformity.  HEENT: The patient is normocephalic, atraumatic. Pupils are equal react to light. Sclerae are anicteric. Moist oral mucosa. Negative pharynx. No oral thrush.   NECK: Supple. No jugular venous distention. Thyroid is not palpable.   LYMPH NODES: Superficial lymphadenopathy is not appreciable in the bilateral cervical, supraclavicular, axillary or inguinal adenopathy.   CARDIOVASCULAR: S1, S2 regular with no murmurs or gallops. No carotid or abdominal bruits. Left upper CW pacemaker.   PULMONARY: Lungs are clear to auscultation and percussion bilaterally. There is no wheezing or rhonchi.   GASTROINTESTINAL: Abdomen is soft, nontender. No hepatosplenomegaly. No signs of ascites. No mass appreciable.   NEUROLOGIC: Cranial nerves II-XII are grossly intact. Sensation intact. Muscle strength and muscle tone symmetrical all through 5/5.   BACK: No spinal or paraspinal tenderness. No CVA tenderness.   SKIN: bulging erythematous rash on nasol upper bridge.    CURRENT LAB DATA REVIEWED  Hb 11.6 from 10.8 from 11.4, wbc/diff nl,  PL at 518 in 10/2018, from 950,   500 + from  900+ from 700-800  CMP is fine.  spep 0.2 in 7/2018, 0.3 in 3/2018 from 0.2 from 0.3   LCA 2.34 in 7/2018,  nl in 3/2018    Current Image Data reviewed  CT a/p 4/2018 - no acute pathology.     OLD DATA REVIEW IN SUMMARY:  US 8/2017 - nl liver and spleen size.     Serum protein electrophoresis 0.3 in 4/2016, in 10/2015 stabilized 0.2 " gm stable     Immunofixation indicating 0.3 gm IgG kappa in 10/2009. She was thrombocytopenic in the later part of 2009, platelets between 100 and 130 when anagrelide dose was adjusted.     ASSESSMENT AND PLAN:   1. Essential thrombocythemia (ET) since 2004. She has been anagrelide for yrs, dose has been adjusted due to anemia and thrombocytosis.   Due to increased PL and improved Hb, she is advised to try 0.5 g S/S, and do and 1 g qd on Mon -Fri.   She is also on baby ASA.    She understand ET is one form of myeloproliferative disease and she needs to be watched for transforming into leukemia and she needs to be on this platelet agent for the rest of her life and her disease may change. She needs to see hematology for the rest of her life.     2. Monoclonal gammopathy of unknown significance (MGUS).   Monitor her for end organ function and at this point.    3.normacytic anemia new in 2016. Likely anagrelide side effects.   We discussed the proper diet. Will check her nutrition status.     Again, thank you for allowing me to participate in the care of your patient.        Sincerely,        Annabel Pelaez MD, MD

## 2018-11-05 NOTE — MR AVS SNAPSHOT
After Visit Summary   11/5/2018    Maggie Baires    MRN: [id number]           Patient Information     Date Of Birth          1/10/1928        Visit Information        Provider Department      11/5/2018 2:15 PM Annabel Pelaez MD [facility]        Today's Diagnoses     ET (essential thrombocythemia) (H)    -  1    MGUS (monoclonal gammopathy of unknown significance)        Anemia, unspecified type          Care Instructions    Dr. Pelaez recommends continuing anagrelide. We would like to see you back in clinic with Dr. Pelaez with 3 months with labs.      Your prescription (anagrelide) has been sent to:   [id number] [address]  [address] [address]  Phone: [phone] Fax: [phone]  When you are in need of a refill, please call your pharmacy and they will send us a request.      Copy of appointments, and after visit summary (AVS) given to patient.      If you have any questions during business hours (M-F 8 AM- 4PM), please call Gisele Mak RN, BSN, OCN Oncology Hematology /Breast Cancer Navigator at [facility] [phone].       For questions after business hours, or on holidays/weekends, please call our after hours Nurse Triage line [phone]. Thank you.        Continue anagrelide. 3 months f/u with labs          Follow-ups after your visit        Your next 10 appointments already scheduled     Dec 12, 2018  9:00 AM CST   30 Day Phone Check with [place] [facility]   [facility] ([facility])    [address] [address] [phone]            Jan 28, 2019 10:00 AM CST   LAB with [facility]   [facility] ([facility])    [address] [place] [address] [phone]           Please do not eat 10-12 hours before your appointment if you are coming in fasting for labs on lipids, cholesterol, or glucose  "(sugar). This does not apply to pregnant women. Water, hot tea and black coffee (with nothing added) are okay. Do not drink other fluids, diet soda or chew gum.            Feb 04, 2019  1:45 PM CST   Return Visit with Annabel Pelaez MD   [facility] ([facility])    [facility]  [address]  [facility] [address] [phone]              Future tests that were ordered for you today     Open Future Orders        Priority Expected Expires Ordered    Basic metabolic panel Routine 2/1/2019 2/28/2019 11/5/2018    CBC with platelets differential Routine 2/1/2019 2/28/2019 11/5/2018    Kappa and lambda light chain Routine 2/1/2019 2/28/2019 11/5/2018    Protein electrophoresis Routine 2/1/2019 2/28/2019 11/5/2018            Who to contact     If you have questions or need follow up information about today's clinic visit or your schedule please contact [facility] directly at [phone].  Normal or non-critical lab and imaging results will be communicated to you by MyChart, letter or phone within 4 business days after the clinic has received the results. If you do not hear from us within 7 days, please contact the clinic through MyChart or phone. If you have a critical or abnormal lab result, we will notify you by phone as soon as possible.  Submit refill requests through [employer] or call your pharmacy and they will forward the refill request to us. Please allow 3 business days for your refill to be completed.          Additional Information About Your Visit        Care EveryWhere ID     This is your Care EveryWhere ID. This could be used by other organizations to access your [place] medical records  [id number]        Your Vitals Were     Pulse Temperature Respirations Height Pulse Oximetry Breastfeeding?    61 98.8  F (37.1  C) (Tympanic) 16 1.588 m (5' 2.5\") 99% No    BMI (Body Mass Index)                   18.21 kg/m2            Blood Pressure from Last " 3 Encounters:   11/05/18 139/57   10/04/18 142/62   07/09/18 145/64    Weight from Last 3 Encounters:   11/05/18 45.9 kg (101 lb 3.2 oz)   10/04/18 45.9 kg (101 lb 3.2 oz)   07/09/18 47.2 kg (104 lb)                 Where to get your medicines      These medications were sent to [id number] - [place], [place] - [address]  [address] [address]    Hours:  Closed 10-16-08 business to [facility] Phone:  [phone]     AGRYLIN 0.5 MG capsule          Primary Care Provider Office Phone # Fax #    Dorian Jensen [phone]       [address] [place] [address]        Equal Access to Services     [facility]: Hadhernan garnica Solorena, waaxda pato, qaybta kaalmaanalilia cisneros, liam kirby . So [facility] [phone].    ATENCIÓN: Si habla español, tiene a singleton disposición servicios gratuitos de asistencia lingüística. [facility] [phone].    We comply with applicable federal civil rights laws and [place] laws. We do not discriminate on the basis of race, color, national origin, age, disability, sex, sexual orientation, or gender identity.            Thank you!     Thank you for choosing [facility]  for your care. Our goal is always to provide you with excellent care. Hearing back from our patients is one way we can continue to improve our services. Please take a few minutes to complete the written survey that you may receive in the mail after your visit with us. Thank you!             Your Updated Medication List - Protect others around you: Learn how to safely use, store and throw away your medicines at www.disposemymeds.org.          This list is accurate as of 11/5/18  2:43 PM.  Always use your most recent med list.                   Brand Name Dispense Instructions for use Diagnosis    AGRYLIN 0.5 MG capsule   Generic drug:  anagrelide     50 capsule    Take 1 capsule 0.5 mg po qd Sat/sun,  2  capsules (1 mg) po qd Mon -Fri    ET (essential thrombocythemia) (H)       aspirin 81 MG tablet     30 tablet    Take by mouth daily        atenolol 25 MG tablet    TENORMIN    90 tablet    TAKE ONE TABLET BY MOUTH ONE TIME DAILY    ACS (acute coronary syndrome) (H)       hypromellose 0.5 % Soln ophthalmic solution    ARTIFICIAL TEARS     Place 1 drop into both eyes 4 times daily as needed        levothyroxine 75 MCG tablet    SYNTHROID/LEVOTHROID    90 tablet    TAKE ONE TABLET BY MOUTH ONE TIME DAILY    Hypothyroidism, unspecified type       VITAMIN D (CHOLECALCIFEROL) PO      Take 2,000 Units by mouth daily

## 2018-11-05 NOTE — PROGRESS NOTES
CHIEF COMPLAINT AND REASON FOR VISIT:   ET on anagrelide,   IgG kappa M protein     HISTORY OF PRESENT ILLNESS: She was diagnosed with essential thrombocytosis since 2004. She has been getting anagrelide treatment for years and her platelet count has been fluctuating between 1.3 million to around 500. She tolerates anagrelide fair. She had extreme thrombocytosis.   She was on anagrelide 1.5 mg a day, 2 of the 0.5 mg tablets in the morning, 0.5 mg tabletsin the evening, this was started mid 10/20/2009. The dose was adjusted prior to that. She has no thrombosis history. Then dose of anagrelide is 1 mg p.o. q.a.m. and 0.5 mg p.o. q.p.m. Monday through Friday and 0.5 mg p.o. bid Saturday and Sunday.   The dose was again reduced in 10/2013 due to dropping PL so she is on 0.5 mg po bid. She refuses to change to other med since it has been working well for her. She starts to have anemia in 2016. Does has been adjusted due to anemia and thrombocytosis.    Also accidental  M IgG kappa protein 0.3 gram in 2009, has MGUS and is on follow up.     OTHER MEDICAL PROBLEMS: Hypothyroidism, osteoarthritis, ET, cardiac dysrhythmia (SSS), pacemaker years ago, cataract. P. HTN. CAD in 11/2013, heart attack 8/2014    MEDICATIONS: Reviewed in Epic system.     ALLERGIES: [place] had extensive skin reaction.      SOCIAL HISTORY: Retired, very active lady. Lives in [place].  She lives in an apartment. She is . Her daughter is close by.     REVIEW OF SYSTEMS:   She has URI and pain in her legs.   she is faithfully taking anagrelide.   She denied any bleeding episode, any thrombosis episode, any paresthesia of her fingertips and toes, any bone pain, constipation.   She volunteers in the community.   She reports dizziness when she changes position occasionally.     PHYSICAL EXAMINATION:   VITAL SIGNS: Blood pressure 139/57, pulse 61, temperature 98.8  F (37.1  C), temperature source Tympanic, resp. rate 16, height 1.588 m (5'  "2.5\"), weight 45.9 kg (101 lb 3.2 oz), SpO2 99 %, not currently breastfeeding.   ECOG 0.     GENERAL APPEARANCE: Elderly lady, looks much younger than her stated age, not in acute distress. She always looks very thin but healthy.   EXTREMITIES: Multiple varicose veins and joints deformity.  HEENT: The patient is normocephalic, atraumatic. Pupils are equal react to light. Sclerae are anicteric. Moist oral mucosa. Negative pharynx. No oral thrush.   NECK: Supple. No jugular venous distention. Thyroid is not palpable.   LYMPH NODES: Superficial lymphadenopathy is not appreciable in the bilateral cervical, supraclavicular, axillary or inguinal adenopathy.   CARDIOVASCULAR: S1, S2 regular with no murmurs or gallops. No carotid or abdominal bruits. Left upper CW pacemaker.   PULMONARY: Lungs are clear to auscultation and percussion bilaterally. There is no wheezing or rhonchi.   GASTROINTESTINAL: Abdomen is soft, nontender. No hepatosplenomegaly. No signs of ascites. No mass appreciable.   NEUROLOGIC: Cranial nerves II-XII are grossly intact. Sensation intact. Muscle strength and muscle tone symmetrical all through 5/5.   BACK: No spinal or paraspinal tenderness. No CVA tenderness.   SKIN: bulging erythematous rash on nasol upper bridge.    CURRENT LAB DATA REVIEWED  Hb 11.6 from 10.8 from 11.4, wbc/diff nl,  PL at 518 in 10/2018, from 950,   500 + from  900+ from 700-800  CMP is fine.  spep 0.2 in 7/2018, 0.3 in 3/2018 from 0.2 from 0.3   LCA 2.34 in 7/2018,  nl in 3/2018    Current Image Data reviewed  CT a/p 4/2018 - no acute pathology.     OLD DATA REVIEW IN SUMMARY:  US 8/2017 - nl liver and spleen size.     Serum protein electrophoresis 0.3 in 4/2016, in 10/2015 stabilized 0.2 gm stable     Immunofixation indicating 0.3 gm IgG kappa in 10/2009. She was thrombocytopenic in the later part of 2009, platelets between 100 and 130 when anagrelide dose was adjusted.     ASSESSMENT AND PLAN:   1. Essential thrombocythemia " (ET) since 2004. She has been anagrelide for yrs, dose has been adjusted due to anemia and thrombocytosis.   Due to increased PL and improved Hb, she is advised to try 0.5 g S/S, and do and 1 g qd on Mon -Fri.   She is also on baby ASA.    She understand ET is one form of myeloproliferative disease and she needs to be watched for transforming into leukemia and she needs to be on this platelet agent for the rest of her life and her disease may change. She needs to see hematology for the rest of her life.     2. Monoclonal gammopathy of unknown significance (MGUS).   Monitor her for end organ function and at this point.    3.normacytic anemia new in 2016. Likely anagrelide side effects.   We discussed the proper diet. Will check her nutrition status.

## 2018-11-05 NOTE — NURSING NOTE
"Oncology Rooming Note    November 5, 2018 2:15 PM   Maggie Baires is a 89 year old female who presents for:    Chief Complaint   Patient presents with     Hematology     Follow up Anemia. Review lab results.      Initial Vitals: /57 (BP Location: Right arm, Patient Position: Sitting, Cuff Size: Adult Small)  Pulse 61  Temp 98.8  F (37.1  C) (Tympanic)  Resp 16  Ht 1.588 m (5' 2.5\")  Wt 45.9 kg (101 lb 3.2 oz)  SpO2 99%  Breastfeeding? No  BMI 18.21 kg/m2 Estimated body mass index is 18.21 kg/(m^2) as calculated from the following:    Height as of this encounter: 1.588 m (5' 2.5\").    Weight as of this encounter: 45.9 kg (101 lb 3.2 oz). Body surface area is 1.42 meters squared.  No Pain (0) Comment: Data Unavailable   No LMP recorded. Patient is postmenopausal.  Allergies reviewed: Yes  Medications reviewed: Yes    Medications: Medication refills not needed today.  Pharmacy name entered into [facility]: [facility][id number] - [facility] [address]    Clinical concerns: Follow up Anemia.     8 minutes for nursing intake (face to face time)     Jessica Feliz CMA            "

## 2018-11-05 NOTE — PATIENT INSTRUCTIONS
Dr. Pelaez recommends continuing anagrelide. We would like to see you back in clinic with Dr. Pelaez with 3 months with labs.      Your prescription (anagrelide) has been sent to:   [facility] #[id number] - [facility] [address]  [address] [address]  Phone: [phone] Fax: [phone]  When you are in need of a refill, please call your pharmacy and they will send us a request.      Copy of appointments, and after visit summary (AVS) given to patient.      If you have any questions during business hours (M-F 8 AM- 4PM), please call Gisele Mak RN, BSN, OCN Oncology Hematology /Breast Cancer Navigator at [facility] [phone].       For questions after business hours, or on holidays/weekends, please call our after hours Nurse Triage line [phone]. Thank you.        Continue anagrelide. 3 months f/u with labs

## 2019-01-01 ENCOUNTER — HOSPITAL ENCOUNTER (OUTPATIENT)
Facility: CLINIC | Age: 84
Setting detail: OBSERVATION
Discharge: NURSING, REHAB OR LONG-TERM CARE | End: 2019-02-15
Attending: EMERGENCY MEDICINE | Admitting: INTERNAL MEDICINE
Payer: MEDICARE

## 2019-01-01 ENCOUNTER — ONCOLOGY VISIT (OUTPATIENT)
Dept: ONCOLOGY | Facility: CLINIC | Age: 84
End: 2019-01-01
Attending: INTERNAL MEDICINE
Payer: MEDICARE

## 2019-01-01 ENCOUNTER — NURSE TRIAGE (OUTPATIENT)
Dept: NURSING | Facility: CLINIC | Age: 84
End: 2019-01-01

## 2019-01-01 ENCOUNTER — NURSING HOME VISIT (OUTPATIENT)
Dept: GERIATRICS | Facility: CLINIC | Age: 84
End: 2019-01-01
Payer: MEDICARE

## 2019-01-01 ENCOUNTER — APPOINTMENT (OUTPATIENT)
Dept: CT IMAGING | Facility: CLINIC | Age: 84
End: 2019-01-01
Attending: EMERGENCY MEDICINE
Payer: MEDICARE

## 2019-01-01 ENCOUNTER — APPOINTMENT (OUTPATIENT)
Dept: PHYSICAL THERAPY | Facility: CLINIC | Age: 84
End: 2019-01-01
Payer: MEDICARE

## 2019-01-01 ENCOUNTER — OFFICE VISIT (OUTPATIENT)
Dept: FAMILY MEDICINE | Facility: CLINIC | Age: 84
End: 2019-01-01
Payer: MEDICARE

## 2019-01-01 ENCOUNTER — PATIENT OUTREACH (OUTPATIENT)
Dept: CARE COORDINATION | Facility: CLINIC | Age: 84
End: 2019-01-01

## 2019-01-01 ENCOUNTER — TELEPHONE (OUTPATIENT)
Dept: FAMILY MEDICINE | Facility: CLINIC | Age: 84
End: 2019-01-01

## 2019-01-01 ENCOUNTER — ANCILLARY PROCEDURE (OUTPATIENT)
Dept: GENERAL RADIOLOGY | Facility: CLINIC | Age: 84
End: 2019-01-01
Payer: MEDICARE

## 2019-01-01 ENCOUNTER — TELEPHONE (OUTPATIENT)
Dept: INFUSION THERAPY | Facility: CLINIC | Age: 84
End: 2019-01-01

## 2019-01-01 ENCOUNTER — TELEPHONE (OUTPATIENT)
Dept: GERIATRICS | Facility: CLINIC | Age: 84
End: 2019-01-01

## 2019-01-01 ENCOUNTER — APPOINTMENT (OUTPATIENT)
Dept: GENERAL RADIOLOGY | Facility: CLINIC | Age: 84
End: 2019-01-01
Attending: EMERGENCY MEDICINE
Payer: MEDICARE

## 2019-01-01 ENCOUNTER — TELEPHONE (OUTPATIENT)
Dept: ONCOLOGY | Facility: CLINIC | Age: 84
End: 2019-01-01

## 2019-01-01 ENCOUNTER — MEDICAL CORRESPONDENCE (OUTPATIENT)
Dept: HEALTH INFORMATION MANAGEMENT | Facility: CLINIC | Age: 84
End: 2019-01-01

## 2019-01-01 VITALS
RESPIRATION RATE: 16 BRPM | SYSTOLIC BLOOD PRESSURE: 182 MMHG | WEIGHT: 93.25 LBS | HEIGHT: 63 IN | HEART RATE: 60 BPM | TEMPERATURE: 97.3 F | OXYGEN SATURATION: 97 % | DIASTOLIC BLOOD PRESSURE: 76 MMHG | BODY MASS INDEX: 16.52 KG/M2

## 2019-01-01 VITALS
SYSTOLIC BLOOD PRESSURE: 134 MMHG | BODY MASS INDEX: 16.83 KG/M2 | WEIGHT: 95 LBS | HEIGHT: 63 IN | DIASTOLIC BLOOD PRESSURE: 60 MMHG | HEART RATE: 64 BPM

## 2019-01-01 VITALS
DIASTOLIC BLOOD PRESSURE: 71 MMHG | RESPIRATION RATE: 20 BRPM | HEIGHT: 63 IN | OXYGEN SATURATION: 96 % | BODY MASS INDEX: 16.64 KG/M2 | TEMPERATURE: 98.6 F | HEART RATE: 72 BPM | SYSTOLIC BLOOD PRESSURE: 131 MMHG | WEIGHT: 93.9 LBS

## 2019-01-01 VITALS
DIASTOLIC BLOOD PRESSURE: 60 MMHG | SYSTOLIC BLOOD PRESSURE: 130 MMHG | BODY MASS INDEX: 17.01 KG/M2 | WEIGHT: 96 LBS | HEART RATE: 76 BPM | TEMPERATURE: 99.4 F | HEIGHT: 63 IN | RESPIRATION RATE: 20 BRPM | OXYGEN SATURATION: 97 %

## 2019-01-01 VITALS
DIASTOLIC BLOOD PRESSURE: 66 MMHG | RESPIRATION RATE: 24 BRPM | WEIGHT: 96.4 LBS | HEIGHT: 63 IN | SYSTOLIC BLOOD PRESSURE: 144 MMHG | TEMPERATURE: 99.4 F | OXYGEN SATURATION: 97 % | BODY MASS INDEX: 17.08 KG/M2 | HEART RATE: 66 BPM

## 2019-01-01 DIAGNOSIS — K59.09 CONSTIPATION, CHRONIC: ICD-10-CM

## 2019-01-01 DIAGNOSIS — M89.8X9 MALIGNANT BONE PAIN: ICD-10-CM

## 2019-01-01 DIAGNOSIS — G93.40 ACUTE ENCEPHALOPATHY: ICD-10-CM

## 2019-01-01 DIAGNOSIS — M89.8X9 MALIGNANT BONE PAIN: Primary | ICD-10-CM

## 2019-01-01 DIAGNOSIS — D47.2 MGUS (MONOCLONAL GAMMOPATHY OF UNKNOWN SIGNIFICANCE): ICD-10-CM

## 2019-01-01 DIAGNOSIS — M54.50 LUMBAR PAIN: ICD-10-CM

## 2019-01-01 DIAGNOSIS — K92.1 BLACK STOOLS: Primary | ICD-10-CM

## 2019-01-01 DIAGNOSIS — D47.3 ET (ESSENTIAL THROMBOCYTHEMIA) (H): ICD-10-CM

## 2019-01-01 DIAGNOSIS — R05.9 COUGH: ICD-10-CM

## 2019-01-01 DIAGNOSIS — C34.32 MALIGNANT NEOPLASM OF LOWER LOBE OF LEFT LUNG (H): Primary | ICD-10-CM

## 2019-01-01 DIAGNOSIS — D47.3 ET (ESSENTIAL THROMBOCYTHEMIA) (H): Primary | ICD-10-CM

## 2019-01-01 DIAGNOSIS — N63.0 BREAST MASS: ICD-10-CM

## 2019-01-01 DIAGNOSIS — E03.9 HYPOTHYROIDISM, UNSPECIFIED TYPE: ICD-10-CM

## 2019-01-01 DIAGNOSIS — R41.0 CONFUSION: ICD-10-CM

## 2019-01-01 DIAGNOSIS — C79.51 METASTATIC CANCER TO BONE (H): ICD-10-CM

## 2019-01-01 DIAGNOSIS — Z51.5 HOSPICE CARE PATIENT: ICD-10-CM

## 2019-01-01 DIAGNOSIS — S39.012A STRAIN OF LUMBAR REGION, INITIAL ENCOUNTER: ICD-10-CM

## 2019-01-01 DIAGNOSIS — E87.1 HYPONATREMIA: ICD-10-CM

## 2019-01-01 DIAGNOSIS — G89.3 MALIGNANT BONE PAIN: ICD-10-CM

## 2019-01-01 DIAGNOSIS — M54.5 ACUTE BILATERAL LOW BACK PAIN, WITH SCIATICA PRESENCE UNSPECIFIED: Primary | ICD-10-CM

## 2019-01-01 DIAGNOSIS — J18.9 PNEUMONIA OF LEFT UPPER LOBE DUE TO INFECTIOUS ORGANISM: Primary | ICD-10-CM

## 2019-01-01 DIAGNOSIS — G89.3 MALIGNANT BONE PAIN: Primary | ICD-10-CM

## 2019-01-01 DIAGNOSIS — D64.9 ANEMIA, UNSPECIFIED TYPE: ICD-10-CM

## 2019-01-01 DIAGNOSIS — R22.2 MASS IN CHEST: ICD-10-CM

## 2019-01-01 LAB
ALBUMIN SERPL-MCNC: 3.5 G/DL (ref 3.4–5)
ALBUMIN SERPL-MCNC: 3.8 G/DL (ref 3.4–5)
ALBUMIN UR-MCNC: NEGATIVE MG/DL
ALP SERPL-CCNC: 59 U/L (ref 40–150)
ALP SERPL-CCNC: 67 U/L (ref 40–150)
ALT SERPL W P-5'-P-CCNC: 14 U/L (ref 0–50)
ALT SERPL W P-5'-P-CCNC: 16 U/L (ref 0–50)
ANION GAP SERPL CALCULATED.3IONS-SCNC: 10 MMOL/L (ref 3–14)
ANION GAP SERPL CALCULATED.3IONS-SCNC: 6 MMOL/L (ref 3–14)
ANION GAP SERPL CALCULATED.3IONS-SCNC: 8 MMOL/L (ref 3–14)
APPEARANCE UR: CLEAR
AST SERPL W P-5'-P-CCNC: 21 U/L (ref 0–45)
AST SERPL W P-5'-P-CCNC: 22 U/L (ref 0–45)
BASOPHILS # BLD AUTO: 0 10E9/L (ref 0–0.2)
BASOPHILS # BLD AUTO: 0 10E9/L (ref 0–0.2)
BASOPHILS NFR BLD AUTO: 0.3 %
BASOPHILS NFR BLD AUTO: 0.3 %
BILIRUB SERPL-MCNC: 0.4 MG/DL (ref 0.2–1.3)
BILIRUB SERPL-MCNC: 0.4 MG/DL (ref 0.2–1.3)
BILIRUB UR QL STRIP: NEGATIVE
BUN SERPL-MCNC: 17 MG/DL (ref 7–30)
BUN SERPL-MCNC: 23 MG/DL (ref 7–30)
BUN SERPL-MCNC: 30 MG/DL (ref 7–30)
CALCIUM SERPL-MCNC: 10.1 MG/DL (ref 8.5–10.1)
CALCIUM SERPL-MCNC: 8.7 MG/DL (ref 8.5–10.1)
CALCIUM SERPL-MCNC: 9.6 MG/DL (ref 8.5–10.1)
CHLORIDE SERPL-SCNC: 91 MMOL/L (ref 94–109)
CHLORIDE SERPL-SCNC: 97 MMOL/L (ref 94–109)
CHLORIDE SERPL-SCNC: 97 MMOL/L (ref 94–109)
CO2 SERPL-SCNC: 24 MMOL/L (ref 20–32)
CO2 SERPL-SCNC: 26 MMOL/L (ref 20–32)
CO2 SERPL-SCNC: 30 MMOL/L (ref 20–32)
COLOR UR AUTO: YELLOW
CREAT SERPL-MCNC: 0.86 MG/DL (ref 0.52–1.04)
CREAT SERPL-MCNC: 0.89 MG/DL (ref 0.52–1.04)
CREAT SERPL-MCNC: 1.13 MG/DL (ref 0.52–1.04)
DIFFERENTIAL METHOD BLD: ABNORMAL
DIFFERENTIAL METHOD BLD: ABNORMAL
EOSINOPHIL # BLD AUTO: 0.3 10E9/L (ref 0–0.7)
EOSINOPHIL # BLD AUTO: 0.3 10E9/L (ref 0–0.7)
EOSINOPHIL NFR BLD AUTO: 1.8 %
EOSINOPHIL NFR BLD AUTO: 2.7 %
ERYTHROCYTE [DISTWIDTH] IN BLOOD BY AUTOMATED COUNT: 14.3 % (ref 10–15)
ERYTHROCYTE [DISTWIDTH] IN BLOOD BY AUTOMATED COUNT: 14.6 % (ref 10–15)
ERYTHROCYTE [DISTWIDTH] IN BLOOD BY AUTOMATED COUNT: 14.7 % (ref 10–15)
GFR SERPL CREATININE-BSD FRML MDRD: 42 ML/MIN/{1.73_M2}
GFR SERPL CREATININE-BSD FRML MDRD: 57 ML/MIN/{1.73_M2}
GFR SERPL CREATININE-BSD FRML MDRD: 59 ML/MIN/{1.73_M2}
GLUCOSE SERPL-MCNC: 120 MG/DL (ref 70–99)
GLUCOSE SERPL-MCNC: 89 MG/DL (ref 70–99)
GLUCOSE SERPL-MCNC: 92 MG/DL (ref 70–99)
GLUCOSE UR STRIP-MCNC: NEGATIVE MG/DL
HCT VFR BLD AUTO: 33.6 % (ref 35–47)
HCT VFR BLD AUTO: 34.7 % (ref 35–47)
HCT VFR BLD AUTO: 38.4 % (ref 35–47)
HEMOCCULT STL QL: NEGATIVE
HGB BLD-MCNC: 10.9 G/DL (ref 11.7–15.7)
HGB BLD-MCNC: 11.8 G/DL (ref 11.7–15.7)
HGB BLD-MCNC: 12.1 G/DL (ref 11.7–15.7)
HGB UR QL STRIP: NEGATIVE
IMM GRANULOCYTES # BLD: 0.1 10E9/L (ref 0–0.4)
IMM GRANULOCYTES # BLD: 0.1 10E9/L (ref 0–0.4)
IMM GRANULOCYTES NFR BLD: 1 %
IMM GRANULOCYTES NFR BLD: 1 %
KETONES UR STRIP-MCNC: 5 MG/DL
LACTATE BLD-SCNC: 1.7 MMOL/L (ref 0.7–2)
LEUKOCYTE ESTERASE UR QL STRIP: NEGATIVE
LYMPHOCYTES # BLD AUTO: 1.2 10E9/L (ref 0.8–5.3)
LYMPHOCYTES # BLD AUTO: 1.6 10E9/L (ref 0.8–5.3)
LYMPHOCYTES NFR BLD AUTO: 11.5 %
LYMPHOCYTES NFR BLD AUTO: 11.8 %
MCH RBC QN AUTO: 28.8 PG (ref 26.5–33)
MCH RBC QN AUTO: 28.8 PG (ref 26.5–33)
MCH RBC QN AUTO: 29.3 PG (ref 26.5–33)
MCHC RBC AUTO-ENTMCNC: 31.5 G/DL (ref 31.5–36.5)
MCHC RBC AUTO-ENTMCNC: 32.4 G/DL (ref 31.5–36.5)
MCHC RBC AUTO-ENTMCNC: 34 G/DL (ref 31.5–36.5)
MCV RBC AUTO: 86 FL (ref 78–100)
MCV RBC AUTO: 89 FL (ref 78–100)
MCV RBC AUTO: 91 FL (ref 78–100)
MONOCYTES # BLD AUTO: 1.3 10E9/L (ref 0–1.3)
MONOCYTES # BLD AUTO: 1.4 10E9/L (ref 0–1.3)
MONOCYTES NFR BLD AUTO: 10.2 %
MONOCYTES NFR BLD AUTO: 11.7 %
NEUTROPHILS # BLD AUTO: 10.2 10E9/L (ref 1.6–8.3)
NEUTROPHILS # BLD AUTO: 7.9 10E9/L (ref 1.6–8.3)
NEUTROPHILS NFR BLD AUTO: 72.8 %
NEUTROPHILS NFR BLD AUTO: 74.9 %
NITRATE UR QL: NEGATIVE
NRBC # BLD AUTO: 0 10*3/UL
NRBC # BLD AUTO: 0 10*3/UL
NRBC BLD AUTO-RTO: 0 /100
NRBC BLD AUTO-RTO: 0 /100
PH UR STRIP: 5 PH (ref 5–7)
PLATELET # BLD AUTO: 650 10E9/L (ref 150–450)
PLATELET # BLD AUTO: 790 10E9/L (ref 150–450)
PLATELET # BLD AUTO: 871 10E9/L (ref 150–450)
POTASSIUM SERPL-SCNC: 3.8 MMOL/L (ref 3.4–5.3)
POTASSIUM SERPL-SCNC: 4.1 MMOL/L (ref 3.4–5.3)
POTASSIUM SERPL-SCNC: 4.7 MMOL/L (ref 3.4–5.3)
PROT SERPL-MCNC: 7.1 G/DL (ref 6.8–8.8)
PROT SERPL-MCNC: 7.7 G/DL (ref 6.8–8.8)
RBC # BLD AUTO: 3.78 10E12/L (ref 3.8–5.2)
RBC # BLD AUTO: 4.03 10E12/L (ref 3.8–5.2)
RBC # BLD AUTO: 4.2 10E12/L (ref 3.8–5.2)
RBC #/AREA URNS AUTO: 0 /HPF (ref 0–2)
SODIUM SERPL-SCNC: 127 MMOL/L (ref 133–144)
SODIUM SERPL-SCNC: 131 MMOL/L (ref 133–144)
SODIUM SERPL-SCNC: 131 MMOL/L (ref 133–144)
SOURCE: ABNORMAL
SP GR UR STRIP: 1.02 (ref 1–1.03)
TROPONIN I SERPL-MCNC: <0.015 UG/L (ref 0–0.04)
TSH SERPL DL<=0.005 MIU/L-ACNC: 2.13 MU/L (ref 0.4–4)
UROBILINOGEN UR STRIP-MCNC: 2 MG/DL (ref 0–2)
WBC # BLD AUTO: 10.6 10E9/L (ref 4–11)
WBC # BLD AUTO: 10.8 10E9/L (ref 4–11)
WBC # BLD AUTO: 13.7 10E9/L (ref 4–11)
WBC #/AREA URNS AUTO: <1 /HPF (ref 0–5)

## 2019-01-01 PROCEDURE — G0378 HOSPITAL OBSERVATION PER HR: HCPCS

## 2019-01-01 PROCEDURE — 25000132 ZZH RX MED GY IP 250 OP 250 PS 637: Mod: GY | Performed by: PHYSICIAN ASSISTANT

## 2019-01-01 PROCEDURE — 99213 OFFICE O/P EST LOW 20 MIN: CPT | Performed by: FAMILY MEDICINE

## 2019-01-01 PROCEDURE — 99358 PROLONG SERVICE W/O CONTACT: CPT | Performed by: NURSE PRACTITIONER

## 2019-01-01 PROCEDURE — 96360 HYDRATION IV INFUSION INIT: CPT | Performed by: EMERGENCY MEDICINE

## 2019-01-01 PROCEDURE — 25000131 ZZH RX MED GY IP 250 OP 636 PS 637: Mod: GY | Performed by: NURSE PRACTITIONER

## 2019-01-01 PROCEDURE — 36415 COLL VENOUS BLD VENIPUNCTURE: CPT | Performed by: EMERGENCY MEDICINE

## 2019-01-01 PROCEDURE — A9270 NON-COVERED ITEM OR SERVICE: HCPCS | Mod: GY | Performed by: PHYSICIAN ASSISTANT

## 2019-01-01 PROCEDURE — G0463 HOSPITAL OUTPT CLINIC VISIT: HCPCS

## 2019-01-01 PROCEDURE — 97161 PT EVAL LOW COMPLEX 20 MIN: CPT | Mod: GP | Performed by: PHYSICAL THERAPIST

## 2019-01-01 PROCEDURE — 71250 CT THORAX DX C-: CPT

## 2019-01-01 PROCEDURE — 71046 X-RAY EXAM CHEST 2 VIEWS: CPT

## 2019-01-01 PROCEDURE — 25800030 ZZH RX IP 258 OP 636: Performed by: EMERGENCY MEDICINE

## 2019-01-01 PROCEDURE — 85025 COMPLETE CBC W/AUTO DIFF WBC: CPT | Performed by: EMERGENCY MEDICINE

## 2019-01-01 PROCEDURE — 80053 COMPREHEN METABOLIC PANEL: CPT | Performed by: NURSE PRACTITIONER

## 2019-01-01 PROCEDURE — 25000132 ZZH RX MED GY IP 250 OP 250 PS 637: Mod: GY | Performed by: EMERGENCY MEDICINE

## 2019-01-01 PROCEDURE — 12000000 ZZH R&B MED SURG/OB

## 2019-01-01 PROCEDURE — 99310 SBSQ NF CARE HIGH MDM 45: CPT | Mod: GW | Performed by: NURSE PRACTITIONER

## 2019-01-01 PROCEDURE — 96361 HYDRATE IV INFUSION ADD-ON: CPT | Performed by: EMERGENCY MEDICINE

## 2019-01-01 PROCEDURE — A9270 NON-COVERED ITEM OR SERVICE: HCPCS | Mod: GY | Performed by: EMERGENCY MEDICINE

## 2019-01-01 PROCEDURE — 93005 ELECTROCARDIOGRAM TRACING: CPT | Performed by: EMERGENCY MEDICINE

## 2019-01-01 PROCEDURE — 70450 CT HEAD/BRAIN W/O DYE: CPT

## 2019-01-01 PROCEDURE — 99207 ZZC APP CREDIT; MD BILLING SHARED VISIT: CPT | Performed by: PHYSICIAN ASSISTANT

## 2019-01-01 PROCEDURE — 83605 ASSAY OF LACTIC ACID: CPT | Performed by: EMERGENCY MEDICINE

## 2019-01-01 PROCEDURE — 81001 URINALYSIS AUTO W/SCOPE: CPT | Performed by: EMERGENCY MEDICINE

## 2019-01-01 PROCEDURE — 99285 EMERGENCY DEPT VISIT HI MDM: CPT | Mod: 25 | Performed by: EMERGENCY MEDICINE

## 2019-01-01 PROCEDURE — 25000132 ZZH RX MED GY IP 250 OP 250 PS 637: Mod: GY | Performed by: FAMILY MEDICINE

## 2019-01-01 PROCEDURE — 40000193 ZZH STATISTIC PT WARD VISIT: Performed by: PHYSICAL THERAPIST

## 2019-01-01 PROCEDURE — 99220 ZZC INITIAL OBSERVATION CARE,LEVL III: CPT | Performed by: INTERNAL MEDICINE

## 2019-01-01 PROCEDURE — 72100 X-RAY EXAM L-S SPINE 2/3 VWS: CPT

## 2019-01-01 PROCEDURE — 84443 ASSAY THYROID STIM HORMONE: CPT | Performed by: EMERGENCY MEDICINE

## 2019-01-01 PROCEDURE — 36415 COLL VENOUS BLD VENIPUNCTURE: CPT | Performed by: NURSE PRACTITIONER

## 2019-01-01 PROCEDURE — 25000132 ZZH RX MED GY IP 250 OP 250 PS 637: Mod: GY | Performed by: NURSE PRACTITIONER

## 2019-01-01 PROCEDURE — 99224 ZZC SUBSEQUENT OBSERVATION CARE,LEVEL I: CPT | Performed by: NURSE PRACTITIONER

## 2019-01-01 PROCEDURE — 80053 COMPREHEN METABOLIC PANEL: CPT | Performed by: EMERGENCY MEDICINE

## 2019-01-01 PROCEDURE — 80048 BASIC METABOLIC PNL TOTAL CA: CPT | Performed by: EMERGENCY MEDICINE

## 2019-01-01 PROCEDURE — 93010 ELECTROCARDIOGRAM REPORT: CPT | Mod: Z6 | Performed by: EMERGENCY MEDICINE

## 2019-01-01 PROCEDURE — 99214 OFFICE O/P EST MOD 30 MIN: CPT | Performed by: INTERNAL MEDICINE

## 2019-01-01 PROCEDURE — 25000132 ZZH RX MED GY IP 250 OP 250 PS 637: Performed by: PHYSICIAN ASSISTANT

## 2019-01-01 PROCEDURE — 99219 ZZC INITIAL OBSERVATION CARE,LEVL II: CPT | Performed by: NURSE PRACTITIONER

## 2019-01-01 PROCEDURE — A9270 NON-COVERED ITEM OR SERVICE: HCPCS | Mod: GY | Performed by: NURSE PRACTITIONER

## 2019-01-01 PROCEDURE — 84484 ASSAY OF TROPONIN QUANT: CPT | Performed by: EMERGENCY MEDICINE

## 2019-01-01 PROCEDURE — 82272 OCCULT BLD FECES 1-3 TESTS: CPT | Performed by: FAMILY MEDICINE

## 2019-01-01 PROCEDURE — 99217 ZZC OBSERVATION CARE DISCHARGE: CPT | Performed by: INTERNAL MEDICINE

## 2019-01-01 PROCEDURE — 25000128 H RX IP 250 OP 636: Performed by: EMERGENCY MEDICINE

## 2019-01-01 PROCEDURE — 99214 OFFICE O/P EST MOD 30 MIN: CPT | Performed by: NURSE PRACTITIONER

## 2019-01-01 PROCEDURE — 36415 COLL VENOUS BLD VENIPUNCTURE: CPT | Performed by: FAMILY MEDICINE

## 2019-01-01 PROCEDURE — 96361 HYDRATE IV INFUSION ADD-ON: CPT

## 2019-01-01 PROCEDURE — 99356 ZZC PROLONGED SERV,INPATIENT,1ST HR: CPT | Performed by: NURSE PRACTITIONER

## 2019-01-01 PROCEDURE — A9270 NON-COVERED ITEM OR SERVICE: HCPCS | Mod: GY | Performed by: FAMILY MEDICINE

## 2019-01-01 PROCEDURE — 85027 COMPLETE CBC AUTOMATED: CPT | Performed by: FAMILY MEDICINE

## 2019-01-01 RX ORDER — ATENOLOL 25 MG/1
25 TABLET ORAL DAILY
Status: DISCONTINUED | OUTPATIENT
Start: 2019-01-01 | End: 2019-01-01 | Stop reason: HOSPADM

## 2019-01-01 RX ORDER — SODIUM CHLORIDE 9 MG/ML
1000 INJECTION, SOLUTION INTRAVENOUS CONTINUOUS
Status: DISCONTINUED | OUTPATIENT
Start: 2019-01-01 | End: 2019-01-01

## 2019-01-01 RX ORDER — ANAGRELIDE HYDROCHLORIDE 0.5 MG/1
CAPSULE ORAL
Qty: 50 CAPSULE | Refills: 3 | Status: ON HOLD | OUTPATIENT
Start: 2019-01-01 | End: 2019-01-01

## 2019-01-01 RX ORDER — DEXAMETHASONE 1 MG
1 TABLET ORAL
Qty: 30 TABLET | Refills: 3 | DISCHARGE
Start: 2019-01-01 | End: 2019-03-18

## 2019-01-01 RX ORDER — LEVOFLOXACIN 500 MG/1
500 TABLET, FILM COATED ORAL DAILY
Qty: 10 TABLET | Refills: 0 | Status: ON HOLD | OUTPATIENT
Start: 2019-01-01 | End: 2019-01-01

## 2019-01-01 RX ORDER — LIDOCAINE 4 G/G
1-2 PATCH TOPICAL EVERY 24 HOURS
Qty: 60 PATCH | Refills: 3 | DISCHARGE
Start: 2019-01-01 | End: 2019-03-17

## 2019-01-01 RX ORDER — AMOXICILLIN 250 MG
2 CAPSULE ORAL 2 TIMES DAILY PRN
Status: DISCONTINUED | OUTPATIENT
Start: 2019-01-01 | End: 2019-01-01 | Stop reason: HOSPADM

## 2019-01-01 RX ORDER — ANAGRELIDE 0.5 MG/1
.5-1 CAPSULE ORAL DAILY
Status: DISCONTINUED | OUTPATIENT
Start: 2019-01-01 | End: 2019-01-01 | Stop reason: CLARIF

## 2019-01-01 RX ORDER — HYDROCODONE BITARTRATE AND ACETAMINOPHEN 5; 325 MG/1; MG/1
1 TABLET ORAL
Qty: 10 TABLET | Refills: 0 | Status: ON HOLD | OUTPATIENT
Start: 2019-01-01 | End: 2019-01-01

## 2019-01-01 RX ORDER — ACETAMINOPHEN 325 MG/1
975 TABLET ORAL
Status: DISCONTINUED | OUTPATIENT
Start: 2019-01-01 | End: 2019-01-01 | Stop reason: HOSPADM

## 2019-01-01 RX ORDER — ONDANSETRON 4 MG/1
4 TABLET, ORALLY DISINTEGRATING ORAL EVERY 6 HOURS PRN
Status: DISCONTINUED | OUTPATIENT
Start: 2019-01-01 | End: 2019-01-01

## 2019-01-01 RX ORDER — MORPHINE SULFATE 100 MG/5ML
2.5 SOLUTION ORAL EVERY 8 HOURS
Qty: 42 ML
Start: 2019-01-01

## 2019-01-01 RX ORDER — ACETAMINOPHEN 325 MG/1
650 TABLET ORAL EVERY 4 HOURS PRN
Status: DISCONTINUED | OUTPATIENT
Start: 2019-01-01 | End: 2019-01-01

## 2019-01-01 RX ORDER — ANAGRELIDE 0.5 MG/1
0.5 CAPSULE ORAL
Status: DISCONTINUED | OUTPATIENT
Start: 2019-01-01 | End: 2019-01-01

## 2019-01-01 RX ORDER — ONDANSETRON 4 MG/1
4-8 TABLET, ORALLY DISINTEGRATING ORAL EVERY 6 HOURS PRN
Status: DISCONTINUED | OUTPATIENT
Start: 2019-01-01 | End: 2019-01-01

## 2019-01-01 RX ORDER — POLYETHYLENE GLYCOL 3350 17 G/17G
17 POWDER, FOR SOLUTION ORAL DAILY
Status: DISCONTINUED | OUTPATIENT
Start: 2019-01-01 | End: 2019-01-01 | Stop reason: HOSPADM

## 2019-01-01 RX ORDER — ANAGRELIDE HYDROCHLORIDE 0.5 MG/1
CAPSULE ORAL
Qty: 50 CAPSULE | Refills: 3 | Status: SHIPPED | OUTPATIENT
Start: 2019-01-01 | End: 2019-01-01

## 2019-01-01 RX ORDER — POLYETHYLENE GLYCOL 3350 17 G/17G
17 POWDER, FOR SOLUTION ORAL DAILY PRN
Status: DISCONTINUED | OUTPATIENT
Start: 2019-01-01 | End: 2019-01-01

## 2019-01-01 RX ORDER — TRAMADOL HYDROCHLORIDE 50 MG/1
25 TABLET ORAL EVERY 6 HOURS PRN
Qty: 20 TABLET | Refills: 0 | Status: SHIPPED | OUTPATIENT
Start: 2019-01-01

## 2019-01-01 RX ORDER — ONDANSETRON 2 MG/ML
4 INJECTION INTRAMUSCULAR; INTRAVENOUS EVERY 6 HOURS PRN
Status: DISCONTINUED | OUTPATIENT
Start: 2019-01-01 | End: 2019-01-01

## 2019-01-01 RX ORDER — POLYETHYLENE GLYCOL 3350 17 G/17G
17 POWDER, FOR SOLUTION ORAL DAILY
Qty: 30 PACKET | Refills: 3 | DISCHARGE
Start: 2019-01-01 | End: 2019-03-18

## 2019-01-01 RX ORDER — NALOXONE HYDROCHLORIDE 0.4 MG/ML
.1-.4 INJECTION, SOLUTION INTRAMUSCULAR; INTRAVENOUS; SUBCUTANEOUS
Status: DISCONTINUED | OUTPATIENT
Start: 2019-01-01 | End: 2019-01-01 | Stop reason: HOSPADM

## 2019-01-01 RX ORDER — LACTOBACILLUS RHAMNOSUS GG 10B CELL
1 CAPSULE ORAL 2 TIMES DAILY
Qty: 60 CAPSULE | Refills: 0 | Status: SHIPPED | OUTPATIENT
Start: 2019-01-01 | End: 2019-03-17

## 2019-01-01 RX ORDER — ONDANSETRON 2 MG/ML
4-8 INJECTION INTRAMUSCULAR; INTRAVENOUS EVERY 6 HOURS PRN
Status: DISCONTINUED | OUTPATIENT
Start: 2019-01-01 | End: 2019-01-01

## 2019-01-01 RX ORDER — BISACODYL 10 MG
10 SUPPOSITORY, RECTAL RECTAL DAILY PRN
Start: 2019-01-01 | End: 2019-03-22

## 2019-01-01 RX ORDER — BENZONATATE 100 MG/1
100 CAPSULE ORAL 3 TIMES DAILY PRN
Status: DISCONTINUED | OUTPATIENT
Start: 2019-01-01 | End: 2019-01-01 | Stop reason: HOSPADM

## 2019-01-01 RX ORDER — ONDANSETRON 4 MG/1
4 TABLET, ORALLY DISINTEGRATING ORAL EVERY 6 HOURS PRN
Status: DISCONTINUED | OUTPATIENT
Start: 2019-01-01 | End: 2019-01-01 | Stop reason: HOSPADM

## 2019-01-01 RX ORDER — LIDOCAINE 4 G/G
1 PATCH TOPICAL
Status: DISCONTINUED | OUTPATIENT
Start: 2019-01-01 | End: 2019-01-01

## 2019-01-01 RX ORDER — LEVOTHYROXINE SODIUM 50 UG/1
50 TABLET ORAL DAILY
Qty: 90 TABLET | Refills: 3
Start: 2019-01-01 | End: 2020-02-20

## 2019-01-01 RX ORDER — HYDROCODONE BITARTRATE AND ACETAMINOPHEN 5; 325 MG/1; MG/1
1 TABLET ORAL EVERY 6 HOURS PRN
Status: DISCONTINUED | OUTPATIENT
Start: 2019-01-01 | End: 2019-01-01

## 2019-01-01 RX ORDER — ACETAMINOPHEN 325 MG/1
975 TABLET ORAL 3 TIMES DAILY
Qty: 270 TABLET | Refills: 3 | DISCHARGE
Start: 2019-01-01 | End: 2019-03-17

## 2019-01-01 RX ORDER — LEVOTHYROXINE SODIUM 75 UG/1
75 TABLET ORAL
Status: DISCONTINUED | OUTPATIENT
Start: 2019-01-01 | End: 2019-01-01 | Stop reason: HOSPADM

## 2019-01-01 RX ORDER — ANAGRELIDE 0.5 MG/1
1 CAPSULE ORAL
Status: DISCONTINUED | OUTPATIENT
Start: 2019-01-01 | End: 2019-01-01

## 2019-01-01 RX ORDER — LEVOFLOXACIN 500 MG/1
500 TABLET, FILM COATED ORAL DAILY
Qty: 10 TABLET | Refills: 0 | Status: SHIPPED | OUTPATIENT
Start: 2019-01-01 | End: 2019-01-01

## 2019-01-01 RX ORDER — ONDANSETRON 2 MG/ML
4 INJECTION INTRAMUSCULAR; INTRAVENOUS EVERY 6 HOURS PRN
Status: DISCONTINUED | OUTPATIENT
Start: 2019-01-01 | End: 2019-01-01 | Stop reason: HOSPADM

## 2019-01-01 RX ORDER — DEXAMETHASONE 0.5 MG/1
1 TABLET ORAL
Status: DISCONTINUED | OUTPATIENT
Start: 2019-01-01 | End: 2019-01-01 | Stop reason: HOSPADM

## 2019-01-01 RX ORDER — ASPIRIN 81 MG/1
81 TABLET ORAL DAILY
Status: DISCONTINUED | OUTPATIENT
Start: 2019-01-01 | End: 2019-01-01 | Stop reason: HOSPADM

## 2019-01-01 RX ORDER — SODIUM CHLORIDE 9 MG/ML
INJECTION, SOLUTION INTRAVENOUS CONTINUOUS
Status: DISCONTINUED | OUTPATIENT
Start: 2019-01-01 | End: 2019-01-01

## 2019-01-01 RX ORDER — LACTOBACILLUS RHAMNOSUS GG 10B CELL
1 CAPSULE ORAL 2 TIMES DAILY
Status: DISCONTINUED | OUTPATIENT
Start: 2019-01-01 | End: 2019-01-01 | Stop reason: HOSPADM

## 2019-01-01 RX ORDER — BENZONATATE 100 MG/1
100 CAPSULE ORAL 3 TIMES DAILY PRN
Qty: 42 CAPSULE | Refills: 0 | Status: SHIPPED | OUTPATIENT
Start: 2019-01-01

## 2019-01-01 RX ORDER — ASPIRIN 81 MG/1
81 TABLET, CHEWABLE ORAL DAILY
Status: DISCONTINUED | OUTPATIENT
Start: 2019-01-01 | End: 2019-01-01

## 2019-01-01 RX ORDER — LIDOCAINE 4 G/G
1-2 PATCH TOPICAL
Status: DISCONTINUED | OUTPATIENT
Start: 2019-01-01 | End: 2019-01-01 | Stop reason: HOSPADM

## 2019-01-01 RX ORDER — HALOPERIDOL 2 MG/ML
0.25 SOLUTION ORAL EVERY 8 HOURS
Qty: 35.1 ML | Refills: 3
Start: 2019-01-01 | End: 2020-02-20

## 2019-01-01 RX ORDER — AMOXICILLIN 250 MG
1 CAPSULE ORAL 2 TIMES DAILY PRN
Status: DISCONTINUED | OUTPATIENT
Start: 2019-01-01 | End: 2019-01-01 | Stop reason: HOSPADM

## 2019-01-01 RX ADMIN — ACETAMINOPHEN 975 MG: 325 TABLET, FILM COATED ORAL at 08:39

## 2019-01-01 RX ADMIN — POLYETHYLENE GLYCOL 3350 17 G: 17 POWDER, FOR SOLUTION ORAL at 08:33

## 2019-01-01 RX ADMIN — ACETAMINOPHEN 650 MG: 325 TABLET, FILM COATED ORAL at 04:28

## 2019-01-01 RX ADMIN — ACETAMINOPHEN 975 MG: 325 TABLET, FILM COATED ORAL at 14:24

## 2019-01-01 RX ADMIN — LIDOCAINE 1 PATCH: 560 PATCH PERCUTANEOUS; TOPICAL; TRANSDERMAL at 08:33

## 2019-01-01 RX ADMIN — ACETAMINOPHEN 975 MG: 325 TABLET, FILM COATED ORAL at 18:19

## 2019-01-01 RX ADMIN — DEXAMETHASONE 1 MG: 0.5 TABLET ORAL at 08:39

## 2019-01-01 RX ADMIN — OMEPRAZOLE 20 MG: 20 CAPSULE, DELAYED RELEASE ORAL at 06:54

## 2019-01-01 RX ADMIN — ACETAMINOPHEN 650 MG: 325 TABLET, FILM COATED ORAL at 10:42

## 2019-01-01 RX ADMIN — ANAGRELIDE HYDROCHLORIDE 1 MG: 0.5 CAPSULE ORAL at 07:58

## 2019-01-01 RX ADMIN — LEVOTHYROXINE SODIUM 75 MCG: 75 TABLET ORAL at 06:54

## 2019-01-01 RX ADMIN — SODIUM CHLORIDE 250 ML: 9 INJECTION, SOLUTION INTRAVENOUS at 18:43

## 2019-01-01 RX ADMIN — ATENOLOL 25 MG: 25 TABLET ORAL at 08:39

## 2019-01-01 RX ADMIN — SODIUM CHLORIDE 1000 ML: 9 INJECTION, SOLUTION INTRAVENOUS at 19:55

## 2019-01-01 RX ADMIN — ATENOLOL 25 MG: 25 TABLET ORAL at 07:58

## 2019-01-01 RX ADMIN — SODIUM CHLORIDE: 9 INJECTION, SOLUTION INTRAVENOUS at 22:12

## 2019-01-01 RX ADMIN — ASPIRIN 81 MG 81 MG: 81 TABLET ORAL at 07:58

## 2019-01-01 RX ADMIN — ASPIRIN 81 MG: 81 TABLET, COATED ORAL at 08:39

## 2019-01-01 RX ADMIN — SODIUM CHLORIDE: 9 INJECTION, SOLUTION INTRAVENOUS at 06:58

## 2019-01-01 RX ADMIN — LIDOCAINE 1 PATCH: 560 PATCH PERCUTANEOUS; TOPICAL; TRANSDERMAL at 12:11

## 2019-01-01 RX ADMIN — POLYETHYLENE GLYCOL 3350 17 G: 17 POWDER, FOR SOLUTION ORAL at 12:24

## 2019-01-01 RX ADMIN — Medication 1 CAPSULE: at 08:39

## 2019-01-01 RX ADMIN — LEVOTHYROXINE SODIUM 75 MCG: 75 TABLET ORAL at 05:54

## 2019-01-01 RX ADMIN — TRAMADOL HYDROCHLORIDE 25 MG: 50 TABLET, COATED ORAL at 02:09

## 2019-01-01 RX ADMIN — Medication 1 CAPSULE: at 19:57

## 2019-01-01 ASSESSMENT — ANXIETY QUESTIONNAIRES
7. FEELING AFRAID AS IF SOMETHING AWFUL MIGHT HAPPEN: SEVERAL DAYS
4. TROUBLE RELAXING: MORE THAN HALF THE DAYS
2. NOT BEING ABLE TO STOP OR CONTROL WORRYING: NEARLY EVERY DAY
5. BEING SO RESTLESS THAT IT IS HARD TO SIT STILL: SEVERAL DAYS
GAD7 TOTAL SCORE: 12
3. WORRYING TOO MUCH ABOUT DIFFERENT THINGS: NEARLY EVERY DAY
7. FEELING AFRAID AS IF SOMETHING AWFUL MIGHT HAPPEN: SEVERAL DAYS
1. FEELING NERVOUS, ANXIOUS, OR ON EDGE: MORE THAN HALF THE DAYS
6. BECOMING EASILY ANNOYED OR IRRITABLE: NOT AT ALL

## 2019-01-01 ASSESSMENT — MIFFLIN-ST. JEOR
SCORE: 815.05
SCORE: 807.13
SCORE: 821.4
SCORE: 816.64
SCORE: 810.06

## 2019-01-01 ASSESSMENT — PATIENT HEALTH QUESTIONNAIRE - PHQ9
SUM OF ALL RESPONSES TO PHQ QUESTIONS 1-9: 12
SUM OF ALL RESPONSES TO PHQ QUESTIONS 1-9: 12
10. IF YOU CHECKED OFF ANY PROBLEMS, HOW DIFFICULT HAVE THESE PROBLEMS MADE IT FOR YOU TO DO YOUR WORK, TAKE CARE OF THINGS AT HOME, OR GET ALONG WITH OTHER PEOPLE: VERY DIFFICULT

## 2019-01-01 ASSESSMENT — ACTIVITIES OF DAILY LIVING (ADL)
ADLS_ACUITY_SCORE: 28
ADLS_ACUITY_SCORE: 28
ADLS_ACUITY_SCORE: 25
ADLS_ACUITY_SCORE: 18

## 2019-01-01 ASSESSMENT — PAIN SCALES - GENERAL: PAINLEVEL: NO PAIN (0)

## 2019-01-09 NOTE — PROGRESS NOTES
SUBJECTIVE:   Maggie Baires is a 89 year old female who presents to clinic today for the following health issues:      Gastrointestinal symptoms      Duration: 2 months    Description: Constipation and Diarrhea intermittently           BLEEDING - black/tarry stools      Intensity:  mild    Accompanying signs and symptoms:  diarrhea, loose stools, constipation and painful bowel movements    History  Previous {similar problem: no   Previous evaluation:  none    Aggravating factors: none    Alleviating factors: nothing    Other Therapies tried: None       RESPIRATORY SYMPTOMS      Duration: 3-4 weeks    Description  rhinorrhea, sore throat, cough, hoarse voice and diarrhea, fatigue    Severity: mild    Accompanying signs and symptoms: Black stools    History (predisposing factors):  none    Precipitating or alleviating factors: None    Therapies tried and outcome:  none          Problem list and histories reviewed & adjusted, as indicated.  Additional history: as documented    Patient Active Problem List   Diagnosis     Other specified cardiac dysrhythmias(427.89)     Hypothyroidism     Generalized osteoarthrosis, unspecified site     Vitamin D deficiency     ET (essential thrombocythemia) (H)     Advanced directives, counseling/discussion     ACS (acute coronary syndrome) (H)     Hyperlipidemia LDL goal <70     Primary pulmonary hypertension (H)     Near syncope     Past Surgical History:   Procedure Laterality Date     CARDIAC CATHERIZATION  13    NSTEMI     COLONOSCOPY  95    Normal Colonosocpy with Normal Barium enema     SURGICAL HISTORY OF -       left cataract     SURGICAL HISTORY OF -       hemorrhoids     SURGICAL HISTORY OF -       left cataract     SURGICAL HISTORY OF -       right cataract     SURGICAL HISTORY OF -       hernia       Social History     Tobacco Use     Smoking status: Former Smoker     Last attempt to quit: 1965     Years since quittin.0     Smokeless tobacco: Never  "Used   Substance Use Topics     Alcohol use: No     Family History   Problem Relation Age of Onset     Cancer Mother         uterine?     Cancer Brother         throat     Cancer Sister         in her 70s, unknown type     Diabetes Sister          Current Outpatient Medications   Medication Sig Dispense Refill     aspirin 81 MG tablet Take by mouth daily 30 tablet 0     atenolol (TENORMIN) 25 MG tablet TAKE ONE TABLET BY MOUTH ONE TIME DAILY 90 tablet 1     hypromellose (ARTIFICIAL TEARS) 0.5 % SOLN Place 1 drop into both eyes 4 times daily as needed       levothyroxine (SYNTHROID/LEVOTHROID) 75 MCG tablet TAKE ONE TABLET BY MOUTH ONE TIME DAILY 90 tablet 3     VITAMIN D, CHOLECALCIFEROL, PO Take 2,000 Units by mouth daily        AGRYLIN 0.5 MG capsule Take 1 capsule 0.5 mg po qd Sat/sun,  2 capsules (1 mg) po qd Mon -Fri 50 capsule 3     Allergies   Allergen Reactions     Hydrea [Hydroxyurea] Rash       Reviewed and updated as needed this visit by clinical staff       Reviewed and updated as needed this visit by Provider         ROS:  CONSTITUTIONAL: NEGATIVE for fever, chills, change in weight  ENT/MOUTH: NEGATIVE for ear, mouth and throat problems  RESP: NEGATIVE for significant cough or SOB  CV: NEGATIVE for chest pain, palpitations or peripheral edema    OBJECTIVE:     /60 (BP Location: Right arm, Patient Position: Chair, Cuff Size: Adult Regular)   Pulse 76   Temp 99.4  F (37.4  C) (Tympanic)   Resp 20   Ht 1.588 m (5' 2.5\")   Wt 43.5 kg (96 lb)   SpO2 97%   BMI 17.28 kg/m    Body mass index is 17.28 kg/m .  GENERAL: healthy, alert and no distress  NECK: no adenopathy, no asymmetry, masses, or scars and thyroid normal to palpation  RESP: lungs clear to auscultation - no rales, rhonchi or wheezes  CV: regular rate and rhythm, normal S1 S2, no S3 or S4, no murmur, click or rub, no peripheral edema and peripheral pulses strong  ABDOMEN: soft, nontender, no hepatosplenomegaly, no masses and bowel " sounds normal  MS: no gross musculoskeletal defects noted, no edema  Rectal show negative occult blood.       ASSESSMENT/PLAN:             1. Black stools  Most likely from the [place]  - CBC with platelets  - Occult blood stool    ASSESSMENT/PLAN:      ICD-10-CM    1. Black stools K92.1 CBC with platelets     Occult blood stool     CANCELED: OCCULT BLOOD, STOOL (3 SPECS)     CANCELED: OCCULT BLOOD, STOOL (3 SPECS)       Patient Instructions   1. This is a resolivng viral illness.    2. Black stools are not blood    3. Stay out of crowds for now.          Dorian Jensen MD  [facility]

## 2019-01-09 NOTE — TELEPHONE ENCOUNTER
Reason for call:  Patient reporting a symptom    Symptom or request: Pt is having black stools. This has been going on for a while. She thought it could be the meds she is on for her platlets. She also has a bad cold and her voice is soft. She is feeling weak.  She was taking ensure and it constipated her. She no longer takes.     Duration (how long have symptoms been present): A long time    Have you been treated for this before? No    Additional comments:     Phone Number patient can be reached at:  Home number on file [phone] (home)    Best Time:  any    Can we leave a detailed message on this number:      Call taken on 1/9/2019 at 1:26 PM by Ruthie Shea

## 2019-01-09 NOTE — TELEPHONE ENCOUNTER
Clinic Action Needed: Yes. Please call [facility] at [phone]    Reason for Call: Gloria calling from [facility] [place] reporting patient's medication AGRYLIN 0.5 MG capsule is not covered by insurance. Calling requesting an alternative medication for patient.     Routed to: Annabel Pelaez MD's Nurse Pool.    Pratik Burleson RN  [facility]

## 2019-01-09 NOTE — PROGRESS NOTES
Pt called and reports [facility] is not able to fill the brand name any more. Switched Rx to [facility]ier[facility] who can order the brand name for her.     Violeta Soliz RN on 1/9/2019 at 4:07 PM

## 2019-01-09 NOTE — TELEPHONE ENCOUNTER
"Pt called and appointment made for today. States she is feeling weak, has had black stools for \"awhile\" also has URI. Patricia López RN  "

## 2019-01-09 NOTE — NURSING NOTE
"Chief Complaint   Patient presents with     URI     Sx's x 4 week- hoarse voice, coughing     Rectal Problem     Black/blood stools, both constipation and diarrhea       Initial /60 (BP Location: Right arm, Patient Position: Chair, Cuff Size: Adult Regular)   Pulse 76   Temp 99.4  F (37.4  C) (Tympanic)   Resp 20   Ht 1.588 m (5' 2.5\")   Wt 43.5 kg (96 lb)   SpO2 97%   BMI 17.28 kg/m   Estimated body mass index is 17.28 kg/m  as calculated from the following:    Height as of this encounter: 1.588 m (5' 2.5\").    Weight as of this encounter: 43.5 kg (96 lb).    Patient presents to the clinic using [employer] Maintenance that is potentially due pending provider review:  PHQ9 and GAD7    Possibly completing today per provider review.    Is there anyone who you would like to be able to receive your results? No  If yes have patient fill out DILAN    "

## 2019-01-09 NOTE — PATIENT INSTRUCTIONS
1. This is a resolivng viral illness.    2. Black stools are not blood    3. Stay out of crowds for now.

## 2019-01-09 NOTE — TELEPHONE ENCOUNTER
Clinic Action Needed: Yes. Please call [facility] at [phone]    Reason for Call: Gloria calling from [facility] [place] reporting patient's medication AGRYLIN 0.5 MG capsule is not covered by insurance. Calling requesting an alternative medication for patient.     Routed to: Annabel Pelaez MD's Nurse Pool.    Pratik Burleson RN  [facility]       Reason for Disposition    Caller has NON-URGENT medication question about med that PCP prescribed and triager unable to answer question    Protocols used: MEDICATION QUESTION CALL-ADULT-

## 2019-01-11 NOTE — TELEPHONE ENCOUNTER
"Called and talked to pt about this and she would like to come see Dr. Pelaez to come up with a plan. She does not want to take the generic and feels she pays enough for multiple insurances so she should not have to. She would like to come in and talk to Dr. Pelaez about this and what to do next. She says \"I am 89 and maybe I should just let myself die naturally\". F/U appt made for 01.17.18. Pt verbalized understanding.     Violeta Soliz RN on 1/11/2019 at 9:50 AM    "

## 2019-01-17 NOTE — PATIENT INSTRUCTIONS
Dr. Pelaez would like you to try the generic version due to insurance coverage.     We would like to see you back in 3 months for a follow up appointment with labs prior.     Prescription has been sent to [facility]    When you are in need of a refill, please call your pharmacy and they will send us a request.      Copy of appointments, and after visit summary (AVS) given to patient.      If you have any questions please call Violeta Soliz RN, BSN Oncology Hematology  [facility] [phone]. For questions after business hours, or on holidays/weekends, please call our after hours Nurse Triage line [phone]. Thank you.         Pt is is instructed to try generic due to insurance coverage. Resent the script. F/u 3 mo w/ labs

## 2019-01-17 NOTE — TELEPHONE ENCOUNTER
Form signed faxed and sent to scanning.  [facility]  Clinic [facility] [place]

## 2019-01-17 NOTE — LETTER
"    1/17/2019         RE: Maggie Baires  [address]  [address] [place] [id number]        Dear Colleague,    Thank you for referring your patient, Maggie Baires, to the [facility]. Please see a copy of my visit note below.    Oncology Rooming Note    January 17, 2019 3:00 PM   Maggie Baires is a 89 year old female who presents for:    Chief Complaint   Patient presents with     Hematology     Recheck ET (essential thrombocythemia),MGUS & Anemia   review Labs      Initial Vitals: /66 (BP Location: Right arm, Patient Position: Sitting, Cuff Size: Adult Regular)   Pulse 66   Temp 99.4  F (37.4  C) (Tympanic)   Resp 24   Ht 1.6 m (5' 3\")   Wt 43.7 kg (96 lb 6.4 oz)   SpO2 97%   BMI 17.08 kg/m   Estimated body mass index is 17.08 kg/m  as calculated from the following:    Height as of this encounter: 1.6 m (5' 3\").    Weight as of this encounter: 43.7 kg (96 lb 6.4 oz). Body surface area is 1.39 meters squared.  No Pain (0) Comment: Data Unavailable   No LMP recorded. Patient is postmenopausal.  Allergies reviewed: Yes  Medications reviewed: Yes    Medications: MEDICATION REFILLS NEEDED TODAY. Provider was notified.  Pharmacy name entered into EPIC: Data Unavailable    Clinical concerns: Recheck ET (essential thrombocythemia),MGUS & Anemia   review Labs     7 minutes for nursing intake (face to face time)     Bebe Newsome CMA      CHIEF COMPLAINT AND REASON FOR VISIT:   ET on anagrelide,   IgG kappa M protein     HISTORY OF PRESENT ILLNESS: She was diagnosed with essential thrombocytosis since 2004. She has been getting anagrelide treatment for years and her platelet count has been fluctuating between 1.3 million to around 500. She tolerates anagrelide fair. She had extreme thrombocytosis.   She was on anagrelide 1.5 mg a day, 2 of the 0.5 mg tablets in the morning, 0.5 mg tabletsin the evening, this was started mid 10/20/2009. The dose was adjusted prior to that. She has no thrombosis " "history. Then dose of anagrelide is 1 mg p.o. q.a.m. and 0.5 mg p.o. q.p.m. Monday through Friday and 0.5 mg p.o. bid Saturday and Sunday.   The dose was again reduced in 10/2013 due to dropping PL so she is on 0.5 mg po bid. She refuses to change to other med since it has been working well for her. She starts to have anemia in 2016. Does has been adjusted due to anemia and thrombocytosis.    Also accidental  M IgG kappa protein 0.3 gram in 2009, has MGUS and is on follow up.     OTHER MEDICAL PROBLEMS: Hypothyroidism, osteoarthritis, ET, cardiac dysrhythmia (SSS), pacemaker years ago, cataract. P. HTN. CAD in 11/2013, heart attack 8/2014    MEDICATIONS: Reviewed in Epic system.     ALLERGIES: Hydrea had extensive skin reaction.      SOCIAL HISTORY: Retired, very active lady. Lives in [place].  She lives in an apartment. She is . Her daughter is close by.     REVIEW OF SYSTEMS:   She has URI and pain in her legs.   she is faithfully taking anagrelide.   She denied any bleeding episode, any thrombosis episode, any paresthesia of her fingertips and toes, any bone pain, constipation.   She volunteers in the community.   She reports dizziness when she changes position occasionally.   She is fighting with an viral URI.  She has concerns on the generic form of anagrelide since her insurance stopped cover agrelin.     PHYSICAL EXAMINATION:   VITAL SIGNS: Blood pressure 144/66, pulse 66, temperature 99.4  F (37.4  C), temperature source Tympanic, resp. rate 24, height 1.6 m (5' 3\"), weight 43.7 kg (96 lb 6.4 oz), SpO2 97 %, not currently breastfeeding.     ECOG 0.     GENERAL APPEARANCE: Elderly lady, looks much younger than her stated age, not in acute distress. She always looks very thin but healthy.   EXTREMITIES: Multiple varicose veins and joints deformity.  HEENT: The patient is normocephalic, atraumatic. Pupils are equal react to light. Sclerae are anicteric. Moist oral mucosa. Negative pharynx. No " oral thrush.   NECK: Supple. No jugular venous distention. Thyroid is not palpable.   LYMPH NODES: Superficial lymphadenopathy is not appreciable in the bilateral cervical, supraclavicular, axillary or inguinal adenopathy.   CARDIOVASCULAR: S1, S2 regular with no murmurs or gallops. No carotid or abdominal bruits. Left upper CW pacemaker.   PULMONARY: Lungs are clear to auscultation and percussion bilaterally. There is no wheezing or rhonchi.   GASTROINTESTINAL: Abdomen is soft, nontender. No hepatosplenomegaly. No signs of ascites. No mass appreciable.   NEUROLOGIC: Cranial nerves II-XII are grossly intact. Sensation intact. Muscle strength and muscle tone symmetrical all through 5/5.   BACK: No spinal or paraspinal tenderness. No CVA tenderness.   SKIN: bulging erythematous rash on nasol upper bridge.    CURRENT LAB DATA REVIEWED  Hb 12 from  11.6 from 10.8 from 11.4, wbc/diff nl,  PL at 800 in 1/2010, at 518 in 10/2018, from 950,   500 + from  900+ from 700-800  CMP is fine.  spep 0.2 in 7/2018, 0.3 in 3/2018 from 0.2 from 0.3   LCA 2.34 in 7/2018,  nl in 3/2018    OLD DATA REVIEW IN SUMMARY:  CT a/p 4/2018 - no acute pathology.     US 8/2017 - nl liver and spleen size.     Serum protein electrophoresis 0.3 in 4/2016, in 10/2015 stabilized 0.2 gm stable     Immunofixation indicating 0.3 gm IgG kappa in 10/2009. She was thrombocytopenic in the later part of 2009, platelets between 100 and 130 when anagrelide dose was adjusted.     ASSESSMENT AND PLAN:   1. Essential thrombocythemia (ET) since 2004. She has been anagrelide for yrs, dose has been adjusted due to anemia and thrombocytosis.   Due to increased PL and improved Hb, she is advised to try 0.5 g S/S, and do and 1 g qd on Mon -Fri.   She is also on baby ASA.  I advice her to try generic form of anagrelide since she had poor tolerance to hydrea years back.     She understand ET is one form of myeloproliferative disease and she needs to be watched for  transforming into leukemia and she needs to be on this platelet agent for the rest of her life and her disease may change. She needs to see hematology for the rest of her life.     2. Monoclonal gammopathy of unknown significance (MGUS).   Monitor her for end organ function and at this point.    3.normacytic anemia new in 2016. Likely anagrelide side effects.   We discussed the proper diet. Will check her nutrition status.     Again, thank you for allowing me to participate in the care of your patient.        Sincerely,        Annabel Pelaez MD, MD

## 2019-01-17 NOTE — PROGRESS NOTES
"Oncology Rooming Note    January 17, 2019 3:00 PM   Maggie Baires is a 89 year old female who presents for:    Chief Complaint   Patient presents with     Hematology     Recheck ET (essential thrombocythemia),MGUS & Anemia   review Labs      Initial Vitals: /66 (BP Location: Right arm, Patient Position: Sitting, Cuff Size: Adult Regular)   Pulse 66   Temp 99.4  F (37.4  C) (Tympanic)   Resp 24   Ht 1.6 m (5' 3\")   Wt 43.7 kg (96 lb 6.4 oz)   SpO2 97%   BMI 17.08 kg/m   Estimated body mass index is 17.08 kg/m  as calculated from the following:    Height as of this encounter: 1.6 m (5' 3\").    Weight as of this encounter: 43.7 kg (96 lb 6.4 oz). Body surface area is 1.39 meters squared.  No Pain (0) Comment: Data Unavailable   No LMP recorded. Patient is postmenopausal.  Allergies reviewed: Yes  Medications reviewed: Yes    Medications: MEDICATION REFILLS NEEDED TODAY. Provider was notified.  Pharmacy name entered into EPIC: Data Unavailable    Clinical concerns: Recheck ET (essential thrombocythemia),MGUS & Anemia   review Labs     7 minutes for nursing intake (face to face time)     Bebe Newsome CMA    "

## 2019-01-17 NOTE — PROGRESS NOTES
CHIEF COMPLAINT AND REASON FOR VISIT:   ET on anagrelide,   IgG kappa M protein     HISTORY OF PRESENT ILLNESS: She was diagnosed with essential thrombocytosis since 2004. She has been getting anagrelide treatment for years and her platelet count has been fluctuating between 1.3 million to around 500. She tolerates anagrelide fair. She had extreme thrombocytosis.   She was on anagrelide 1.5 mg a day, 2 of the 0.5 mg tablets in the morning, 0.5 mg tabletsin the evening, this was started mid 10/20/2009. The dose was adjusted prior to that. She has no thrombosis history. Then dose of anagrelide is 1 mg p.o. q.a.m. and 0.5 mg p.o. q.p.m. Monday through Friday and 0.5 mg p.o. bid Saturday and Sunday.   The dose was again reduced in 10/2013 due to dropping PL so she is on 0.5 mg po bid. She refuses to change to other med since it has been working well for her. She starts to have anemia in 2016. Does has been adjusted due to anemia and thrombocytosis.    Also accidental  M IgG kappa protein 0.3 gram in 2009, has MGUS and is on follow up.     OTHER MEDICAL PROBLEMS: Hypothyroidism, osteoarthritis, ET, cardiac dysrhythmia (SSS), pacemaker years ago, cataract. P. HTN. CAD in 11/2013, heart attack 8/2014    MEDICATIONS: Reviewed in Epic system.     ALLERGIES: [place] had extensive skin reaction.      SOCIAL HISTORY: Retired, very active lady. Lives in [place].  She lives in an apartment. She is . Her daughter is close by.     REVIEW OF SYSTEMS:   She has URI and pain in her legs.   she is faithfully taking anagrelide.   She denied any bleeding episode, any thrombosis episode, any paresthesia of her fingertips and toes, any bone pain, constipation.   She volunteers in the community.   She reports dizziness when she changes position occasionally.   She is fighting with an viral URI.  She has concerns on the generic form of anagrelide since her insurance stopped cover agrelin.     PHYSICAL EXAMINATION:   VITAL  "SIGNS: Blood pressure 144/66, pulse 66, temperature 99.4  F (37.4  C), temperature source Tympanic, resp. rate 24, height 1.6 m (5' 3\"), weight 43.7 kg (96 lb 6.4 oz), SpO2 97 %, not currently breastfeeding.     ECOG 0.     GENERAL APPEARANCE: Elderly lady, looks much younger than her stated age, not in acute distress. She always looks very thin but healthy.   EXTREMITIES: Multiple varicose veins and joints deformity.  HEENT: The patient is normocephalic, atraumatic. Pupils are equal react to light. Sclerae are anicteric. Moist oral mucosa. Negative pharynx. No oral thrush.   NECK: Supple. No jugular venous distention. Thyroid is not palpable.   LYMPH NODES: Superficial lymphadenopathy is not appreciable in the bilateral cervical, supraclavicular, axillary or inguinal adenopathy.   CARDIOVASCULAR: S1, S2 regular with no murmurs or gallops. No carotid or abdominal bruits. Left upper CW pacemaker.   PULMONARY: Lungs are clear to auscultation and percussion bilaterally. There is no wheezing or rhonchi.   GASTROINTESTINAL: Abdomen is soft, nontender. No hepatosplenomegaly. No signs of ascites. No mass appreciable.   NEUROLOGIC: Cranial nerves II-XII are grossly intact. Sensation intact. Muscle strength and muscle tone symmetrical all through 5/5.   BACK: No spinal or paraspinal tenderness. No CVA tenderness.   SKIN: bulging erythematous rash on nasol upper bridge.    CURRENT LAB DATA REVIEWED  Hb 12 from  11.6 from 10.8 from 11.4, wbc/diff nl,  PL at 800 in 1/2010, at 518 in 10/2018, from 950,   500 + from  900+ from 700-800  CMP is fine.  spep 0.2 in 7/2018, 0.3 in 3/2018 from 0.2 from 0.3   LCA 2.34 in 7/2018,  nl in 3/2018    OLD DATA REVIEW IN SUMMARY:  CT a/p 4/2018 - no acute pathology.     US 8/2017 - nl liver and spleen size.     Serum protein electrophoresis 0.3 in 4/2016, in 10/2015 stabilized 0.2 gm stable     Immunofixation indicating 0.3 gm IgG kappa in 10/2009. She was thrombocytopenic in the later part of " 2009, platelets between 100 and 130 when anagrelide dose was adjusted.     ASSESSMENT AND PLAN:   1. Essential thrombocythemia (ET) since 2004. She has been anagrelide for yrs, dose has been adjusted due to anemia and thrombocytosis.   Due to increased PL and improved Hb, she is advised to try 0.5 g S/S, and do and 1 g qd on Mon -Fri.   She is also on baby ASA.  I advice her to try generic form of anagrelide since she had poor tolerance to hydrea years back.     She understand ET is one form of myeloproliferative disease and she needs to be watched for transforming into leukemia and she needs to be on this platelet agent for the rest of her life and her disease may change. She needs to see hematology for the rest of her life.     2. Monoclonal gammopathy of unknown significance (MGUS).   Monitor her for end organ function and at this point.    3.normacytic anemia new in 2016. Likely anagrelide side effects.   We discussed the proper diet. Will check her nutrition status.

## 2019-01-24 NOTE — TELEPHONE ENCOUNTER
Prior Authorization Retail Medication Request    Medication/Dose: prior auth for agrylin 0.5  ICD code (if different than what is on RX):  -  Previously Tried and Failed:  -  Rationale:  -    Insurance Name:  PDM  Insurance ID:  [id number]      Pharmacy Information (if different than what is on RX)  Name:  [facility]  Phone:  [phone]     Thank You!  Analia Robbins  [facility]  P: [phone] F:[phone]

## 2019-02-04 NOTE — PATIENT INSTRUCTIONS
Obtain Ct scans       Patient Education     Pneumonia (Adult)  Pneumonia is an infection deep within the lungs. It is in the small air sacs (alveoli). Pneumonia may be caused by a virus or bacteria. Pneumonia caused by bacteria is usually treated with an antibiotic. Severe cases may need to be treated in the hospital. Milder cases can be treated at home. Symptoms usually start to get better during the first 2 days of treatment.    Home care  Follow these guidelines when caring for yourself at home:    Rest at home for the first 2 to 3 days, or until you feel stronger. Don t let yourself get overly tired when you go back to your activities.    Stay away from cigarette smoke - yours or other people s.    You may use acetaminophen or ibuprofen to control fever or pain, unless another medicine was prescribed. If you have chronic liver or kidney disease, talk with your healthcare provider before using these medicines. Also talk with your provider if you ve had a stomach ulcer or gastrointestinal bleeding. Don t give aspirin to anyone younger than 18 years of age who is ill with a fever. It may cause severe liver damage.    Your appetite may be poor, so a light diet is fine.    Drink 6 to 8 glasses of fluids every day to make sure you are getting enough fluids. Beverages can include water, sport drinks, sodas without caffeine, juices, tea, or soup. Fluids will help loosen secretions in the lung. This will make it easier for you to cough up the phlegm (sputum). If you also have heart or kidney disease, check with your healthcare provider before you drink extra fluids.    Take antibiotic medicine prescribed until it is all gone, even if you are feeling better after a few days.  Follow-up care  Follow up with your healthcare provider in the next 2 to 3 days, or as advised. This is to be sure the medicine is helping you get better.  If you are 65 or older, you should get a pneumococcal vaccine and a yearly flu  (influenza) shot. You should also get these vaccines if you have chronic lung disease like asthma, emphysema, or COPD. Recently, a second type of pneumonia vaccine has become available for everyone over 65 years old. This is in addition to the previous vaccine. Ask your provider about this.  When to seek medical advice  Call your healthcare provider right away if any of these occur:    You don t get better within the first 48 hours of treatment    Shortness of breath gets worse    Rapid breathing (more than 25 breaths per minute)    Coughing up blood    Chest pain gets worse with breathing    Fever of 100.4 F (38 C) or higher that doesn t get better with fever medicine    Weakness, dizziness, or fainting that gets worse    Thirst or dry mouth that gets worse    Sinus pain, headache, or a stiff neck    Chest pain not caused by coughing  Date Last Reviewed: 1/1/2017 2000-2018 [employer]. [address] [place] [address]. All rights reserved. This information is not intended as a substitute for professional medical care. Always follow your healthcare professional's instructions.           Patient Education     Back Pain (Acute or Chronic)    Back pain is one of the most common problems. The good news is that most people feel better in 1 to 2 weeks, and most of the rest in 1 to 2 months. Most people can remain active.  People experience and describe pain differently; not everyone is the same.    The pain can be sharp, stabbing, shooting, aching, cramping or burning.    Movement, standing, bending, lifting, sitting, or walking may worsen pain.    It can be localized to one spot or area, or it can be more generalized.    It can spread or radiate upwards, to the front, or go down your arms or legs (sciatica).    It can cause muscle spasm.  Most of the time, mechanical problems with the muscles or spine cause the pain. Mechanical problems are usually caused by an injury to the muscles or  ligaments. While illness can cause back pain, it is usually not caused by a serious illness. Mechanical problems include:     Physical activity such as sports, exercise, work, or normal activity    Overexertion, lifting, pushing, pulling incorrectly or too aggressively    Sudden twisting, bending, or stretching from an accident, or accidental movement    Poor posture    Stretching or moving wrong, without noticing pain at the time    Poor coordination, lack of regular exercise (check with your doctor about this)    Spinal disc disease or arthritis    Stress  Pain can also be related to pregnancy, or illness like appendicitis, bladder or kidney infections, pelvic infections, and many other things.  Acute back pain usually gets better in 1 to 2 weeks. Back pain related to disk disease, arthritis in the spinal joints or spinal stenosis (narrowing of the spinal canal) can become chronic and last for months or years.  Unless you had a physical injury (for example, a car accident or fall) X-rays are usually not needed for the initial evaluation of back pain. If pain continues and does not respond to medical treatment, X-rays and other tests may be needed.  Home care  Try these home care recommendations:    When in bed, try to find a position of comfort. A firm mattress is best. Try lying flat on your back with pillows under your knees. You can also try lying on your side with your knees bent up towards your chest and a pillow between your knees.    At first, do not try to stretch out the sore spots. If there is a strain, it is not like the good soreness you get after exercising without an injury. In this case, stretching may make it worse.    Avoid prolong sitting, long car rides, or travel. This puts more stress on the lower back than standing or walking.    During the first 24 to 72 hours after an acute injury or flare up of chronic back pain, apply an ice pack to the painful area for 20 minutes and then remove it for  20 minutes. Do this over a period of 60 to 90 minutes or several times a day. This will reduce swelling and pain. Wrap the ice pack in a thin towel or plastic to protect your skin.    You can start with ice, then switch to heat. Heat (hot shower, hot bath, or heating pad) reduces pain and works well for muscle spasms. Heat can be applied to the painful area for 20 minutes then remove it for 20 minutes. Do this over a period of 60 to 90 minutes or several times a day. Do not sleep on a heating pad. It can lead to skin burns or tissue damage.    You can alternate ice and heat therapy. Talk with your doctor about the best treatment for your back pain.    Therapeutic massage can help relax the back muscles without stretching them.    Be aware of safe lifting methods and do not lift anything without stretching first.  Medicines  Talk to your doctor before using medicine, especially if you have other medical problems or are taking other medicines.    You may use over-the-counter medicine as directed on the bottle to control pain, unless another pain medicine was prescribed. If you have chronic conditions like diabetes, liver or kidney disease, stomach ulcers, or gastrointestinal bleeding, or are taking blood thinners, talk to your doctor before taking any medicine.    Be careful if you are given a prescription medicines, narcotics, or medicine for muscle spasms. They can cause drowsiness, affect your coordination, reflexes, and judgement. Do not drive or operate heavy machinery.  Follow-up care  Follow up with your healthcare provider, or as advised.   A radiologist will review any X-rays that were taken. Your provide will notify you of any new findings that may affect your care.  Call 911  Call emergency services if any of the following occur:    Trouble breathing    Confusion    Very drowsy or trouble awakening    Fainting or loss of consciousness    Rapid or very slow heart rate    Loss of bowel or bladder  control  When to seek medical advice  Call your healthcare provider right away if any of these occur:     Pain becomes worse or spreads to your legs    Weakness or numbness in one or both legs    Numbness in the groin or genital area  Date Last Reviewed: 7/1/2016 2000-2017 [employer]. [address] [place] [address]. All rights reserved. This information is not intended as a substitute for professional medical care. Always follow your healthcare professional's instructions.

## 2019-02-04 NOTE — PROGRESS NOTES
SUBJECTIVE:   Maggie Baires is a 89 year old female who presents to clinic today for the following health issues:    Back Pain       Duration: about one week        Specific cause: none    Description:   Location of pain: low back in the middle  Character of pain: sharp  Pain radiation:none  New numbness or weakness in legs, not attributed to pain:  no     Intensity: moderate    History:   Pain interferes with job: Not applicable  History of back problems: no prior back problems  Any previous MRI or X-rays: None  Sees a specialist for back pain:  No  Therapies tried without relief: nothing    Alleviating factors:   Improved by: nothing    Precipitating factors:  Worsened by: changing positions      Accompanying Signs & Symptoms:  Risk of Fracture:  Age >64  Risk of Cauda Equina:  None  Risk of Infection:  None  Risk of Cancer:  risk  Risk of Ankylosing Spondylitis:  Onset at age <35, male, AND morning back stiffness. no       Problem list and histories reviewed & adjusted, as indicated.  Additional history: as documented    Patient Active Problem List   Diagnosis     Other specified cardiac dysrhythmias(427.89)     Hypothyroidism     Generalized osteoarthrosis, unspecified site     Vitamin D deficiency     ET (essential thrombocythemia) (H)     Advanced directives, counseling/discussion     ACS (acute coronary syndrome) (H)     Hyperlipidemia LDL goal <70     Primary pulmonary hypertension (H)     Near syncope     Past Surgical History:   Procedure Laterality Date     CARDIAC CATHERIZATION  11/1/13    NSTEMI     COLONOSCOPY  12-19-95    Normal Colonosocpy with Normal Barium enema     SURGICAL HISTORY OF -       left cataract     SURGICAL HISTORY OF -       hemorrhoids     SURGICAL HISTORY OF -       left cataract     SURGICAL HISTORY OF -       right cataract     SURGICAL HISTORY OF -       hernia       Social History     Tobacco Use     Smoking status: Former Smoker     Last attempt to quit: 12/28/1965     Years  since quittin.1     Smokeless tobacco: Never Used   Substance Use Topics     Alcohol use: No     Family History   Problem Relation Age of Onset     Cancer Mother         uterine?     Cancer Brother         throat     Cancer Sister         in her 70s, unknown type     Diabetes Sister          Current Outpatient Medications   Medication Sig Dispense Refill     AGRYLIN 0.5 MG capsule Take 1 capsule 0.5 mg po qd Sat/sun,  2 capsules (1 mg) po qd Mon -Fri 50 capsule 3     aspirin 81 MG tablet Take by mouth daily 30 tablet 0     atenolol (TENORMIN) 25 MG tablet TAKE ONE TABLET BY MOUTH ONE TIME DAILY 90 tablet 1     hypromellose (ARTIFICIAL TEARS) 0.5 % SOLN Place 1 drop into both eyes 4 times daily as needed       levothyroxine (SYNTHROID/LEVOTHROID) 75 MCG tablet TAKE ONE TABLET BY MOUTH ONE TIME DAILY 90 tablet 3     VITAMIN D, CHOLECALCIFEROL, PO Take 2,000 Units by mouth daily        Allergies   Allergen Reactions     Hydrea [Hydroxyurea] Rash     Recent Labs   Lab Test 18  2128 18  1046  18  1023  17  1306  13  0902  13  0920  11  1029   LDL  --   --   --   --   --   --   --  72  --  123  --  123   HDL  --   --   --   --   --   --   --  65  --  55  --  53   TRIG  --   --   --   --   --   --   --  81  --  102  --  144   ALT 19 24  --  24  --   --    < > 25   < > 39  --  22   CR 0.96 1.02  --  0.97   < >  --    < > 0.97   < >  --    < > 0.96   GFRESTIMATED 55* 51*   < > 54*   < >  --    < > 55*   < >  --    < > 56*   GFRESTBLACK 66 62   < > 66   < >  --    < > 66   < >  --    < > 67   POTASSIUM 4.1 4.0  --  4.2   < >  --    < > 4.6   < >  --    < > 4.9   TSH  --   --   --  0.42  --  0.34*   < > 1.50  --   --    < >  --     < > = values in this interval not displayed.      BP Readings from Last 3 Encounters:   19 134/60   19 144/66   19 130/60    Wt Readings from Last 3 Encounters:   19 43.1 kg (95 lb)   19 43.7 kg (96 lb 6.4 oz)   19  "43.5 kg (96 lb)                    Reviewed and updated as needed this visit by clinical staff       Reviewed and updated as needed this visit by Provider         ROS:  Constitutional, HEENT, cardiovascular, pulmonary, gi and gu systems are negative, except as otherwise noted.    OBJECTIVE:     /60 (BP Location: Right arm, Cuff Size: Child)   Pulse 64   Ht 1.6 m (5' 3\")   Wt 43.1 kg (95 lb)   BMI 16.83 kg/m    Body mass index is 16.83 kg/m .  GENERAL: healthy, alert and no distress  EYES: Eyes grossly normal to inspection, PERRL and conjunctivae and sclerae normal  HENT: ear canals and TM's normal, nose and mouth without ulcers or lesions  NECK: no adenopathy, no asymmetry, masses, or scars and thyroid normal to palpation  RESP: lungs clear to auscultation - no rales, rhonchi or wheezes  CV: regular rate and rhythm, normal S1 S2, no S3 or S4, no murmur, click or rub, no peripheral edema and peripheral pulses strong  MS: no gross musculoskeletal defects noted, no edema  SKIN: no suspicious lesions or rashes  NEURO: Normal strength and tone, mentation intact and speech normal  PSYCH: mentation appears normal, affect normal/bright    Tender:  left para lumbar muscles, right para lumbar muscles  Non-tender:  thoracic spinous processes, thoracic facet joints, left parathoracic muscles, right parathoracic muscles, left SI joint, right SI joint, left sciatic notch, right sciatic notch  Range of Motion:  left lateral thoracic bending   decreased, right lateral thoracic bending  decreased, left thoracic rotation  decreased, right thoracic rotation  decreased, lumbar flexion  decreased, lumbar extension  decreased, painful, left lateral lumbar bending  decreased, painful, right lateral lumbar bending  decreased, painful, left lateral lumbar rotation  decreased, painful, right lateral lumbar rotation  decreased, painful    Creatinine clearance 54 no dose adjustment to levothyroxine  Results for orders placed or " performed in visit on 02/04/19   Comprehensive metabolic panel   Result Value Ref Range    Sodium 127 (L) 133 - 144 mmol/L    Potassium 4.7 3.4 - 5.3 mmol/L    Chloride 91 (L) 94 - 109 mmol/L    Carbon Dioxide 30 20 - 32 mmol/L    Anion Gap 6 3 - 14 mmol/L    Glucose 92 70 - 99 mg/dL    Urea Nitrogen 17 7 - 30 mg/dL    Creatinine 0.89 0.52 - 1.04 mg/dL    GFR Estimate 57 (L) >60 mL/min/[1.73_m2]    GFR Estimate If Black 66 >60 mL/min/[1.73_m2]    Calcium 10.1 8.5 - 10.1 mg/dL    Bilirubin Total 0.4 0.2 - 1.3 mg/dL    Albumin 3.8 3.4 - 5.0 g/dL    Protein Total 7.7 6.8 - 8.8 g/dL    Alkaline Phosphatase 67 40 - 150 U/L    ALT 16 0 - 50 U/L    AST 21 0 - 45 U/L     CHEST TWO VIEWS 2/4/2019 2:40 PM      HISTORY: Cough.     COMPARISON: 7/7/2018     FINDINGS: There is abnormal airspace opacity adjacent to the arch of  the aorta in the left lung. Right lung is clear. The lungs appear  emphysematous. No pneumothorax or pleural effusion. Left-sided pacer  device unchanged.                                                                       IMPRESSION: Abnormal airspace density adjacent to the aortic arch and  the left lung. This could represent a malignancy or developing  infection. CT scan of the chest with contrast recommended.      DAIANA CHERY MD    LUMBAR SPINE THREE VIEWS  2/4/2019 2:40 PM      HISTORY: Lumbar pain.     COMPARISON: None.                                                                      IMPRESSION:  There is biconvex thoracolumbar scoliosis. Moderate  multilevel degenerative disc disease. Vertebral body heights  maintained. No listhesis. Dense aortic calcifications noted.     DAIANA CHERY MD    ASSESSMENT/PLAN:   (J18.1) Pneumonia of left upper lobe due to infectious organism (H)  (primary encounter diagnosis)  Comment: X-ray shows density concerning for developing infection versus malignancy recommend CT scan.  Will treat with a course of levofloxacin have patient obtain a CT scan.   Patient does have history of smoking and also history of recent weight loss.  Plan: Comprehensive metabolic panel, CT Chest w         Contrast, levofloxacin (LEVAQUIN) 500 MG         tablet, DISCONTINUED: levofloxacin (LEVAQUIN)         500 MG tablet      (S39.012A) Strain of lumbar region, initial encounter  Comment: Concern for strain of the lumbar spine based on symptoms and x-ray will obtain an CT of the back  Plan: CT of the back    (E87.1) Hyponatremia  Comment: Sodium mildly low.  Reviewed patient is to monitor water intake patient is not on a diuretic and we will recheck that in 1-2 weeks  Plan: Basic metabolic panel            (M54.5) Lumbar pain  Comment: Patient to take ibuprofen or Tylenol for the pain did supply a very small amount of pain medications did review concerns with taking pain medications and aging population.  Patient is only take if absolutely necessary and should be monitored when she takes it  Plan: XR Lumbar Spine 2/3 Views, CT Lumbar Spine w/o         Contrast, HYDROcodone-acetaminophen (NORCO)         5-325 MG tablet       (R05) Cough  Comment: We will prescribe Tessalon Perles for the patient's cough  Plan: XR Chest 2 Views, benzonatate (TESSALON PERLES)        100 MG capsule    Concern for lung cancer with metastases to the back.  Did order a CT of the chest as well as CT of the lumbar spine.  Reviewed with daughter and patient should have that done in the next 1-2 days.  In the interim we will treat in the event it is pneumonia with a course of levothyroxine.       ANASTASIA Villalpando CNP  [facility]

## 2019-02-06 NOTE — TELEPHONE ENCOUNTER
Daughter requests home health referral one was placed today.    Daughter had concerns about patient's cognitive function.  Patient to make an appointment for further evaluation was not brought up at her appointment yesterday.  We would need to do a thorough workup if daughter concerned about change in patient's cognitive function.    Again reminded them to move forward with CT scan of the lumbar spine and chest.      Alissa Petty CNP

## 2019-02-06 NOTE — TELEPHONE ENCOUNTER
Reason for Call:  Other       Detailed comments: Pt's daughter Kaylee Sanz says she talked to Alissa Petty yesterday about her mom. She says she has another question she would like to talk to her about.    Phone Number Kalyee can be reached at:  [phone]    Best Time: anytime    Can we leave a detailed message on this number? YES    Call taken on 2/6/2019 at 1:17 PM by Ann Whipple

## 2019-02-11 NOTE — TELEPHONE ENCOUNTER
[facility] now requests orders and shares plan of care/discharge summaries for some patients through [email or web address].  Please REPLY TO THIS MESSAGE OR ROUTE BACK TO THE AUTHOR in order to give authorization for orders when needed.  This is considered a verbal order, you will still receive a faxed copy of orders for signature.  Thank you for your assistance in improving collaboration for our patients.    ORDER    MD SUMMARY/PLAN OF CARE    PT for 1w1, 2w2 for ambulation, transfers, exercises, pain managment with MFR/STM.

## 2019-02-11 NOTE — TELEPHONE ENCOUNTER
[facility] now requests orders and shares plan of care/discharge summaries for some patients through [employer].  Please REPLY TO THIS MESSAGE OR ROUTE BACK TO THE AUTHOR in order to give authorization for orders when needed.  This is considered a verbal order, you will still receive a faxed copy of orders for signature.  Thank you for your assistance in improving collaboration for our patients.    ORDER    MD SUMMARY/PLAN OF CARE    Recommend order for aki front wheeled walker be sent to a medicare vendor ([employer]).    Liza Ibarra MPT

## 2019-02-13 PROBLEM — R41.0 CONFUSION: Status: ACTIVE | Noted: 2019-01-01

## 2019-02-13 NOTE — ED NOTES
Patient has  [place] to Observation  order. Patient has been given Observation brochure  What does Observation mean to me .  Patient has been given the opportunity to ask questions about observation status and their plan of care.  Janet Lilly RN

## 2019-02-13 NOTE — TELEPHONE ENCOUNTER
[facility], per Daughter.    Sujatha Acevedo  [facility]

## 2019-02-13 NOTE — ED PROVIDER NOTES
History     Chief Complaint   Patient presents with     Altered Mental Status     HPI  Maggie Baires is a 89 year old female with generalized osteoarthrosis, acute coronary syndrome, essential thrombocythemia, hypothyroidism, cardiac dysrhythmia, and near syncope who presents to the ED with altered mental status. The patient arrived via EMS after a call from her family due to concern of her wandering from her home into the cold. The patient is pleasant and exhibits sporatic conversation. The patient reports she remembers having her Wednesday meal today. She reports someone came to her home today and called the ambulance. She reports they called because she has been having allusions. She is aware is in the hospital and reports she is not in pain anywhere. She reports she lives alone and her son passed away in October. She reports she has a daughter who is alive, who currently lives with her family. The patient reports she is on her 8th pacemaker. She reports she was 130 pounds all of her life but has not wanted to eat lately. The patient states she has had good vision all of her life but recently has been having issue with her eyes. She reports falling recently.  Daughter arrived and stated that mother's confusion is been present for the past 3 weeks.  She seems it started shortly after she was diagnosed with a possible pneumonia/mass in her chest and was started on Levaquin and hydrocodone which was also given for back pain.  The mother had trouble with these medications per daughter and she stopped them fairly quickly.  Since that time she has had decreased concentration and has appeared more confused.  She cannot remember how to answer a phone or how to turn on her TV.  Nursing staff was visiting in them every day over the last week but still she was having troubles and they recommended bringing her in.    Patient Active Problem List   Diagnosis     Other specified cardiac dysrhythmias(427.89)     Hypothyroidism  "    Generalized osteoarthrosis, unspecified site     Vitamin D deficiency     ET (essential thrombocythemia) (H)     Advanced directives, counseling/discussion     ACS (acute coronary syndrome) (H)     Hyperlipidemia LDL goal <70     Primary pulmonary hypertension (H)     Near syncope     Current Outpatient Medications   Medication Sig Dispense Refill     AGRYLIN 0.5 MG capsule Take 1 capsule 0.5 mg po qd Sat/sun,  2 capsules (1 mg) po qd Mon -Fri 50 capsule 3     aspirin 81 MG tablet Take by mouth daily 30 tablet 0     atenolol (TENORMIN) 25 MG tablet TAKE ONE TABLET BY MOUTH ONE TIME DAILY 90 tablet 1     benzonatate (TESSALON PERLES) 100 MG capsule Take 1 capsule (100 mg) by mouth 3 times daily as needed for cough 42 capsule 0     HYDROcodone-acetaminophen (NORCO) 5-325 MG tablet Take 1 tablet by mouth nightly as needed for pain 10 tablet 0     hypromellose (ARTIFICIAL TEARS) 0.5 % SOLN Place 1 drop into both eyes 4 times daily as needed       levothyroxine (SYNTHROID/LEVOTHROID) 75 MCG tablet TAKE ONE TABLET BY MOUTH ONE TIME DAILY 90 tablet 3     VITAMIN D, CHOLECALCIFEROL, PO Take 2,000 Units by mouth daily        Allergies   Allergen Reactions     Hydrea [Hydroxyurea] Rash     Allergies:  Allergies   Allergen Reactions     Hydrea [Hydroxyurea] Rash       Problem List:    Patient Active Problem List    Diagnosis Date Noted     Near syncope 07/08/2018     Priority: Medium     Primary pulmonary hypertension (H) 08/26/2014     Priority: Medium     Hyperlipidemia LDL goal <70 11/12/2013     Priority: Medium     ACS (acute coronary syndrome) (H) 11/01/2013     Priority: Medium     Advanced directives, counseling/discussion 12/14/2011     Priority: Medium     12-13-11 \"No thank you, I don't full around with that, what happens happens\"       ET (essential thrombocythemia) (H) 04/20/2010     Priority: Medium     Vitamin D deficiency 01/29/2010     Priority: Medium     Hypothyroidism 01/11/2007     Priority: Medium     " Problem list name updated by automated process. Provider to review       Generalized osteoarthrosis, unspecified site 2007     Priority: Medium     Other specified cardiac dysrhythmias(427.89) 2005     Priority: Medium     On her 6th pacemaker          Past Medical History:    Past Medical History:   Diagnosis Date     ACS (acute coronary syndrome) (H) 2013     ET (essential thrombocythemia) (H) 2010     Generalized osteoarthrosis, unspecified site      Hyperlipidemia LDL goal <70 2013     HYPOTHYROIDISM NOS 2007     Malignant neoplasm (H)      Other specified cardiac dysrhythmias(427.89)      Primary localized osteoarthrosis, lower leg      Qualitative platelet defects (H)      Thyrotoxicosis without mention of goiter or other cause, without mention of thyrotoxic crisis or storm      Unspecified cataract 2004     Unspecified cataract 2004     Vitamin D deficiency 2010       Past Surgical History:    Past Surgical History:   Procedure Laterality Date     CARDIAC CATHERIZATION  13    NSTEMI     COLONOSCOPY  95    Normal Colonosocpy with Normal Barium enema     SURGICAL HISTORY OF -       left cataract     SURGICAL HISTORY OF -       hemorrhoids     SURGICAL HISTORY OF -       left cataract     SURGICAL HISTORY OF -       right cataract     SURGICAL HISTORY OF -       hernia       Family History:    Family History   Problem Relation Age of Onset     Cancer Mother         uterine?     Cancer Brother         throat     Cancer Sister         in her 70s, unknown type     Diabetes Sister        Social History:  Marital Status:   [5]  Social History     Tobacco Use     Smoking status: Former Smoker     Last attempt to quit: 1965     Years since quittin.1     Smokeless tobacco: Never Used   Substance Use Topics     Alcohol use: No     Drug use: No        Medications:      AGRYLIN 0.5 MG capsule   aspirin 81 MG tablet   atenolol (TENORMIN) 25 MG tablet    benzonatate (TESSALON PERLES) 100 MG capsule   HYDROcodone-acetaminophen (NORCO) 5-325 MG tablet   hypromellose (ARTIFICIAL TEARS) 0.5 % SOLN   levothyroxine (SYNTHROID/LEVOTHROID) 75 MCG tablet   VITAMIN D, CHOLECALCIFEROL, PO         Review of Systems   Unable to perform ROS: Mental status change       Physical Exam   BP: 185/79  Heart Rate: 61  Temp: 98.4  F (36.9  C)  Resp: 20  Weight: 47.6 kg (105 lb)  SpO2: 97 %          Physical Exam   Psychiatric:   Confused conversation. Pleasant .   Nursing note and vitals reviewed.     Constitutional: thin, frail elderly female  HENT: Oral mucosa moist. No lesions.  Neck: Supple  Pulmonary/Chest: Lungs are clear to auscultation bilaterally. Slight decreased at basis  Cardiovascular: Heart is regular rate and rhythm. No murmur. Pacemaker left chest. No erythema, edema  Abdomen: Soft, non-distended, non-tender.   Musculoskeletal: Moving all extremities well. No peripheral edema.   Neurological: Alert to person and place.  No focal neurologic deficit.  Positive confusion on questioning. follows commands appropriately.  Skin: No rash.      ED Course        Procedures               EKG Interpretation:      Interpreted by Ayden Zavala  Rhythm: normal sinus   Rate: Normal  Axis: Normal  Ectopy: none  Conduction: normal  ST Segments/ T Waves: Non-specific ST-T wave changes  Q Waves: none  Comparison to prior: Unchanged from 7/7/18.    Clinical Impression: NSR with non specific st twave abnormality.                Critical Care time:  none               Results for orders placed or performed during the hospital encounter of 02/13/19 (from the past 24 hour(s))   CT Head w/o Contrast    Narrative    CT SCAN OF THE HEAD WITHOUT CONTRAST   2/13/2019 6:17 PM     HISTORY: Altered level of consciousness (LOC), unexplained; confusion    TECHNIQUE:  Axial images of the head and coronal reformations without  IV contrast material. Radiation dose for this scan was reduced  using  automated exposure control, adjustment of the mA and/or kV according  to patient size, or iterative reconstruction technique.    COMPARISON: None.    FINDINGS:  There is diffuse parenchymal volume loss.  White matter  changes are present in the cerebral hemispheres that are consistent  with small vessel ischemic disease in this age patient. There is no  evidence of intracranial hemorrhage, mass, acute infarct or anomaly.  The visualized portions of the sinuses and mastoids appear normal.  There is no evidence of trauma.      Impression    IMPRESSION: No acute pathology. No bleed, mass, or infarcts are  identified.      LONNY PARISI MD   Chest XR,  PA & LAT    Narrative    CHEST TWO VIEW   2/13/2019 6:18 PM     HISTORY: Shortness of breath.    COMPARISON: 2/4/2019.    FINDINGS: Left subclavian cardiac device in place. No pneumothorax.  The heart size is normal. There is again a mass or consolidation  abutting the aortic arch not significantly changed in interval. The  left hemidiaphragm is elevated. There is small left pleural effusion.  The lungs are otherwise clear.      Impression    IMPRESSION:   1. Persistent mass or consolidation abutting the aortic arch.  2. Small left pleural effusion.   CBC with platelets differential   Result Value Ref Range    WBC 13.7 (H) 4.0 - 11.0 10e9/L    RBC Count 4.03 3.8 - 5.2 10e12/L    Hemoglobin 11.8 11.7 - 15.7 g/dL    Hematocrit 34.7 (L) 35.0 - 47.0 %    MCV 86 78 - 100 fl    MCH 29.3 26.5 - 33.0 pg    MCHC 34.0 31.5 - 36.5 g/dL    RDW 14.6 10.0 - 15.0 %    Platelet Count 790 (H) 150 - 450 10e9/L    Diff Method Automated Method     % Neutrophils 74.9 %    % Lymphocytes 11.8 %    % Monocytes 10.2 %    % Eosinophils 1.8 %    % Basophils 0.3 %    % Immature Granulocytes 1.0 %    Nucleated RBCs 0 0 /100    Absolute Neutrophil 10.2 (H) 1.6 - 8.3 10e9/L    Absolute Lymphocytes 1.6 0.8 - 5.3 10e9/L    Absolute Monocytes 1.4 (H) 0.0 - 1.3 10e9/L    Absolute Eosinophils 0.3 0.0 -  0.7 10e9/L    Absolute Basophils 0.0 0.0 - 0.2 10e9/L    Abs Immature Granulocytes 0.1 0 - 0.4 10e9/L    Absolute Nucleated RBC 0.0    Comprehensive metabolic panel   Result Value Ref Range    Sodium 131 (L) 133 - 144 mmol/L    Potassium 4.1 3.4 - 5.3 mmol/L    Chloride 97 94 - 109 mmol/L    Carbon Dioxide 24 20 - 32 mmol/L    Anion Gap 10 3 - 14 mmol/L    Glucose 120 (H) 70 - 99 mg/dL    Urea Nitrogen 30 7 - 30 mg/dL    Creatinine 1.13 (H) 0.52 - 1.04 mg/dL    GFR Estimate 42 (L) >60 mL/min/[1.73_m2]    GFR Estimate If Black 49 (L) >60 mL/min/[1.73_m2]    Calcium 9.6 8.5 - 10.1 mg/dL    Bilirubin Total 0.4 0.2 - 1.3 mg/dL    Albumin 3.5 3.4 - 5.0 g/dL    Protein Total 7.1 6.8 - 8.8 g/dL    Alkaline Phosphatase 59 40 - 150 U/L    ALT 14 0 - 50 U/L    AST 22 0 - 45 U/L   Lactic acid whole blood   Result Value Ref Range    Lactic Acid 1.7 0.7 - 2.0 mmol/L   Troponin I   Result Value Ref Range    Troponin I ES <0.015 0.000 - 0.045 ug/L   UA with Microscopic   Result Value Ref Range    Color Urine Yellow     Appearance Urine Clear     Glucose Urine Negative NEG^Negative mg/dL    Bilirubin Urine Negative NEG^Negative    Ketones Urine 5 (A) NEG^Negative mg/dL    Specific Gravity Urine 1.017 1.003 - 1.035    Blood Urine Negative NEG^Negative    pH Urine 5.0 5.0 - 7.0 pH    Protein Albumin Urine Negative NEG^Negative mg/dL    Urobilinogen mg/dL 2.0 0.0 - 2.0 mg/dL    Nitrite Urine Negative NEG^Negative    Leukocyte Esterase Urine Negative NEG^Negative    Source Catheterized Urine     WBC Urine <1 0 - 5 /HPF    RBC Urine 0 0 - 2 /HPF   Chest CT w/o contrast    Narrative    CT CHEST WITHOUT CONTRAST  2/13/2019 7:55 PM    HISTORY: Cough, persistent. Lung mass near aorta.    TECHNIQUE: Scans obtained from the apices through the diaphragm  without IV contrast. Radiation dose for this scan was reduced using  automated exposure control, adjustment of the mA and/or kV according  to patient size, or iterative reconstruction  technique.    COMPARISON:  None.    FINDINGS: There is a solid mass consistent with malignancy abutting  the AP window aortic arch and superior left hilum. This mass measures  approximately 6.7 cm AP x 4 cm transverse. There is scarring at the  lung apices. A few peripheral bands of probable scarring bilaterally.  There is a pleural-based mass posteriorly on the left measuring up to  2 cm and probably eroding the posterior seventh rib. There is a  calcified granuloma at the left lung base. Very small left pleural  effusion. There is a soft tissue mass in the superior left breast  measuring 1.7 x 1.4 cm. There is no lymph node enlargement. Images  through the upper abdomen are remarkable for a 2 cm x 1.8 cm left  adrenal gland mass which appears larger than on the previous exam.  There is a new 1.6 cm lytic lesion in the T10 vertebral body. Left  subclavian cardiac device in place.      Impression    IMPRESSION:  1. There is a 6.7 cm mass abutting the left superior mediastinum and  aortic arch, consistent with a malignancy.  2. 2 cm pleural-based mass in the left hemithorax posteriorly eroding  the seventh rib.  3. There is a lytic lesion in the T10 vertebral body.   4. 1.7 cm left superior breast mass may be a malignancy.  5. Trace left pleural effusion.       Medications   sodium chloride 0.9% infusion (1,000 mLs Intravenous New Bag 2/13/19 1955)   0.9% sodium chloride BOLUS (0 mLs Intravenous Stopped 2/13/19 1955)        5:10 PM Patient Assessed.     Assessments & Plan (with Medical Decision Making) records were reviewed.  Labs EKG and CT scan of the head were obtained.  EKG revealed sinus rhythm with nonspecific ST-T wave abnormalities.  No significant change from previous.  Patient was given an IV fluid bolus.  Urine analysis was unremarkable.  White count was elevated at 13.7 with a platelet count of 790.  This is decreased from the most recent platelet count.  If there was a left shift.  Opponent was within  normal limits.  CT scan of the head revealed no acute abnormality.  Small vessel ischemic disease is present.  Chest x-ray revealed a mass/consolidation at the aortic knob.  CT scan was recommended.  Patient's lactic acid was 1.7.  Findings were discussed with family.  Due to her confusion and living on her own was felt she should be admitted. I have considered infection as a possible cause of her confusion but have no obvious source. I considered LP but have decided to hold off on this.  I discussed getting the CT scan with the family and they are in agreement.  CT scan revealed a 6.7 cm mass abutting the left superior mediastinum and aortic arch consistent with malignancy.  There is a 2 cm pleural-based mass in the left hemithorax posteriorly eroding the seventh rib.  There was a lytic lesion in the T10 vertebral body.  There was a 1.7 cm left superior breast mass which may be a malignancy.  Trace pleural effusion on the left.  I discussed the case with Renata DE LA CRUZ with hospitalist service who is in agreement with admission of the patient.  I unfortunately went back in the room to discuss my findings of the CT scan with the family but they had left and were not in the building anymore.  Patient will be admitted for further evaluation and care.  I did call the patient's family and discussed the case with her daughter and advised her she needed to think how aggressive they wanted to be working all of this up.  I advised her that the hospitalist service will talk to her in the morning about this.   They Will be in in the morning.     I have reviewed the nursing notes.    I have reviewed the findings, diagnosis, plan and need for follow up with the patient.           Final diagnoses:   Confusion   Mass in chest   Hyponatremia     This document serves as a record of the services and decisions personally performed and made by Ayden Zavala MD. It was created on HIS/HER behalf by   leah Yeager  trained medical scribe. The creation of this document is based the provider's statements to the medical scribe.  Gayatri Rodrigo 5:29 PM 2/13/2019    Provider:   The information in this document, created by the medical scribe for me, accurately reflects the services I personally performed and the decisions made by me. I have reviewed and approved this document for accuracy prior to leaving the patient care area.  Ayden Zavala MD 5:29 PM 2/13/2019 2/13/2019   [facility] EMERGENCY DEPARTMENT     Aydne Zavala MD  02/14/19 1133

## 2019-02-13 NOTE — LETTER
Transition Communication Hand-off for Care Transitions to Next Level of Care Provider    Name: Maggie Baires  : 1/10/1928  MRN #: [id number]  Primary Care Provider: Dorian Jensen     Primary Clinic: [address] [place] [id number]     Reason for Hospitalization:  Confusion [R41.0]  Admit Date/Time: 2019  4:40 PM  Discharge Date: 2/15/19  Payor Source: Payor: MEDICARE / Plan: MEDICARE / Product Type: Medicare /     Readmission Assessment Measure (PABLO) Risk Score/category: average           Reason for Communication Hand-off Referral: Fragility    Discharge Plan:[facility]       Concern for non-adherence with plan of care:   Y/N no  Discharge Needs Assessment:  Needs      Most Recent Value   # of Referrals Placed by [facility]  Post Acute Facilities   Skilled Nursing Facility  [facility] [phone], Fax: [phone]          Follow-up plan:    Future Appointments   Date Time Provider Department Center   3/13/2019  9:00 AM [place] [facility]   2019 10:00 AM [facility] LAB [facility]   2019 11:00 AM Annabel Pelaez MD [facility]           Tsang Recommendations:  Pt is discharging to [facility] (Main: [phone] Admissions: [phone] Fax: [phone]) [facility] today. She will also be enrolling in [facility] (phone: [phone] Fax: [phone]). Family involved in care    Tabitha Flynn MSW, LICSW, [phone]    AVS/Discharge Summary is the source of truth; this is a helpful guide for improved communication of patient story

## 2019-02-14 PROBLEM — I25.10 CAD (CORONARY ARTERY DISEASE): Chronic | Status: ACTIVE | Noted: 2019-01-01

## 2019-02-14 PROBLEM — G93.40 ACUTE ENCEPHALOPATHY: Status: ACTIVE | Noted: 2019-01-01

## 2019-02-14 PROBLEM — I49.5 SICK SINUS SYNDROME (H): Status: ACTIVE | Noted: 2019-01-01

## 2019-02-14 PROBLEM — I47.10 PAROXYSMAL SUPRAVENTRICULAR TACHYCARDIA (H): Status: ACTIVE | Noted: 2019-01-01

## 2019-02-14 NOTE — CONSULTS
CARE TRANSITION SOCIAL WORK INITIAL ASSESSMENT:      Met with: Patient and Family.    DATA  Principal Problem:    Confusion  Active Problems:    Hypothyroidism    Hyperlipidemia LDL goal <70    Primary pulmonary hypertension (H)    CAD (coronary artery disease)    Sick sinus syndrome (H)    Paroxysmal supraventricular tachycardia (H)    Acute encephalopathy      ASSESSMENT  Cognitive Status: awake and alert.       Description of Support System: Involved, Supportive   Who is your support system?: Children       Insurance Concerns: No Insurance issues identified    This writer met with pt and family introduced self and role. Discussed discharge planning and medicare guidelines in regards to home care and SNF benefits.  Patient lives at home alone.  She is physically and cognitively not safe to return home alone.  There is no family that can stay with her 24/7.  Discussed long term care and private pay.  Faith would like referral sent to [facility] (Phone: [phone] Admissions: [phone] Fax: [phone]).  Referral sent.  Palliative Care consult with Artem.  Family expressed wanting [facility] ([phone] Fax: [phone]) at [facility].  Financial counselor will meet with family in the morning to fill out a MA application.  Family is also interested in Care Transitions looking into if there is availability at the assisted living at [facility].  This will need to be done on 2/15/19.    PLAN    TCU (referral pending) with [facility]     FEMI Adorno  [facility] [phone]/ [facility] [phone]

## 2019-02-14 NOTE — CONSULTS
"[facility]    Palliative Care Consultation--Inpatient  Admission Date: 2/13/2019   Visit Date: February 14, 2019  PCP: Dorian Jensen   Requested by: Sarah Moise      HISTORY of PRESENT ILLNESS:  Maggie Baires is a 89 year old year old female admitted with altered mental status. She presented to her local clinic on 2/4 for c/o back pain and a CXR at that time showed an \"airspace density\" near the aortic arch.  She was given an antibiotic for possible pneumonia with plans for further CT testing to r/o solid mass.  She was also prescribed Norco for pain, which the patient's daughter held after several days due to confusion.  The confusion persisted and the patient was sent to the ED yesterday for it; a CT demonstrated a solid 6.7 cm mass c/w malignancy, a lytic lesion at T10, and erosion of the posterior left 7th rib.   . She was referred to Palliative Care for exploration of goals of care..      ASSESSMENT & PLAN:    Bone pain primarily over the site of the posterior left 7th rib, worse with movement, likely 2o metastasis from large lung mass  Anorexia with dysguesia  Significant 10% weight loss (105 --> 95 lbs)  Fatigue, neoplastic-related  > Decadron 1 mg po with Omeprazole or Protonix 20 mg qAM for GI protection  > Consider calcitonin nasal spray    # Chronic constipation, onset in childhood  > daily Miralax    # Advance Care Planning:  > Understanding of condition:  Daughter Lashay and granddaughter Janet understand that this will lead to the patient's death within a few months  > Decisional capacity:  Lacking in patient due to confusion  > Code status:  Full Code per discussion with Lashay, who heard her mother state she wouldn't want it as it would aggravate her pain  > Health Care Directive: NO  > POLST:  No    # Goals of Care:  > daughter, only surviving child, requests placement, preferably at [facility]  > also requests transition from home care to hospice care  > have DC'd Agrylin in " light of plans to enroll in hospice, and have advised Dr Pelaez of those plans      Thank you for the opportunity to be of service to this patient and family.      Artem Martinez) ELIER France  Palliative Care  Private cell:  [phone]     Face to face:   1330 - 1430, 60 min, > 50% spent reviewing symptoms, exploring goals, explaining the hospice benefit  Non face to face:   1430 - 1540, 70 min, > 50% spent reviewing chart and coordinating care with  attending, nursing, Care Transitions and hospice.       = = = = = = = = = = = = = = = =      PROBLEM LIST  Patient Active Problem List   Diagnosis     Other specified cardiac dysrhythmias(427.89)     Hypothyroidism     Generalized osteoarthrosis, unspecified site     Vitamin D deficiency     ET (essential thrombocythemia) (H)     ACS (acute coronary syndrome) (H)     Hyperlipidemia LDL goal <70     Primary pulmonary hypertension (H)     Near syncope     Confusion     CAD (coronary artery disease)     Sick sinus syndrome (H)     Paroxysmal supraventricular tachycardia (H)     Acute encephalopathy       PAST MEDICAL HISTORY  Past Medical History:   Diagnosis Date     ACS (acute coronary syndrome) (H) 11/1/2013     CAD (coronary artery disease) 2/14/2019     ET (essential thrombocythemia) (H) 4/20/2010     Generalized osteoarthrosis, unspecified site      Hyperlipidemia LDL goal <70 11/12/2013     HYPOTHYROIDISM NOS 1/11/2007     Malignant neoplasm (H)      Other specified cardiac dysrhythmias(427.89)      Primary localized osteoarthrosis, lower leg      Qualitative platelet defects (H)      Thyrotoxicosis without mention of goiter or other cause, without mention of thyrotoxic crisis or storm      Unspecified cataract 5/2004    left eye     Unspecified cataract 6/2004    RIGHT EYE     Vitamin D deficiency 1/29/2010       PAST SURGICAL HISTORY  Past Surgical History:   Procedure Laterality Date     CARDIAC CATHERIZATION  11/1/13    NSTEMI     COLONOSCOPY  12-19-95    Normal  Colonosocpy with Normal Barium enema     SURGICAL HISTORY OF -       left cataract     SURGICAL HISTORY OF -       hemorrhoids     SURGICAL HISTORY OF -       left cataract     SURGICAL HISTORY OF -       right cataract     SURGICAL HISTORY OF -       hernia       FAMILY MEDICAL HISTORY  Family History   Problem Relation Age of Onset     Cancer Mother         uterine?     Cancer Brother         throat     Cancer Sister         in her 70s, unknown type     Diabetes Sister        SOCIAL HISTORY  History   Smoking Status     Former Smoker     Packs/day: 1.00     Years: 20.00     Quit date: 1965   Smokeless Tobacco     Never Used     Social History    Substance and Sexual Activity      Alcohol use: No    History   Drug Use No     Social History     Social History Narrative    2019: born in [place].  Cared for her  after he became debilitated following a ruptured AAA.  Had one son and one daughter; her son  end of 2018 in [place] after being on dialysis for years and then developing kidney cancer.  Remote 20 pack year smoking history. No alcohol.  Lives in a senior apartment complex in [place] and was independent in all ADLs until recently.  She even continued to drive until early January, and had volunteered 3d/week at [employer].  Recent rapid decline.      Artem France CNP (Ann)    Palliative Care    Private cell:  [phone]        SPIRITUAL HISTORY  Belongs to no particular Taoism but believes in God and explains she prays to him all day every day.     ALLERGIES  Allergies   Allergen Reactions     Hydrea [Hydroxyurea] Rash       MEDICATIONS  Medications Prior to Admission    No current facility-administered medications on file prior to encounter.   Current Outpatient Medications on File Prior to Encounter:  AGRYLIN 0.5 MG capsule Take 1 capsule 0.5 mg po qd Sat/sun,  2 capsules (1 mg) po qd Mon -Fri   aspirin (ASA) 81 MG EC tablet Take 81 mg by mouth daily    atenolol (TENORMIN) 25 MG tablet TAKE ONE TABLET BY MOUTH ONE TIME DAILY   hypromellose (ARTIFICIAL TEARS) 0.5 % SOLN Place 1 drop into both eyes 4 times daily as needed   levothyroxine (SYNTHROID/LEVOTHROID) 75 MCG tablet TAKE ONE TABLET BY MOUTH ONE TIME DAILY   benzonatate (TESSALON PERLES) 100 MG capsule Take 1 capsule (100 mg) by mouth 3 times daily as needed for cough   HYDROcodone-acetaminophen (NORCO) 5-325 MG tablet Take 1 tablet by mouth nightly as needed for pain   VITAMIN D, CHOLECALCIFEROL, PO Take 2,000 Units by mouth daily        Current Medications    acetaminophen  975 mg Oral TID     [START ON 2/15/2019] aspirin  81 mg Oral Daily     atenolol  25 mg Oral Daily     [START ON 2/15/2019] dexamethasone  1 mg Oral Daily with breakfast     lactobacillus rhamnosus (GG)  1 capsule Oral BID     levothyroxine  75 mcg Oral QAM AC     [START ON 2/15/2019] lidocaine  1-2 patch Transdermal Q24H     lidocaine   Transdermal Q8H     lidocaine   Transdermal Q24h     [START ON 2/15/2019] omeprazole  20 mg Oral QAM AC     [START ON 2/15/2019] polyethylene glycol  17 g Oral Daily       PRN Medications  benzonatate, naloxone, ondansetron **OR** ondansetron, senna-docusate **OR** senna-docusate, traMADol      REVIEW OF SYSTEMS  Pain most pronounced in posterior left ribs; signitifant relief from Lidoderm patch.  Pain had been severe but since being started on scheduled Tylenol and the patch, it has become quite tolerable.  Also periodically develops severe severe severe b/l leg cramps associated with the curling up of her toes; only relieved by getting out of bed and walking.  Normal weight is 105 lbs but, due to dysguesia and anorexia, wt now 95 lbs, a 10 lb weight loss.  Admits to recent word- and name-finding difficulties.  Has a painless R and L breast lumps, newly discovered by granddaughter Janet.  Fatigue; daugher often finds her mother napping during the day, and she previously would NEVER nap.  Since childhood  "has had chronic constipation with stools typically hard balls.  Last BM was 3d ago.    Performance Assessment:    Palliative Performance Scale, v.2     40%  Extensive disease. Mainly in bed w/little ambulation. ADLs/activities mainly w/assistance. Normal or reduced intake. Full LOC or drowsy or confused.        PHYSICAL EXAMINATION  Patient Vitals for the past 24 hrs:   BP Temp Temp src Pulse Heart Rate Resp SpO2 Height Weight   02/14/19 1512 150/70 98.2  F (36.8  C) Oral 60 60 18 95 % -- --   02/14/19 1144 122/51 -- Oral 58 -- 18 94 % -- --   02/14/19 0754 175/78 96  F (35.6  C) Oral 60 -- 18 97 % -- --   02/14/19 0458 183/83 98  F (36.7  C) Oral 60 -- 20 96 % -- --   02/13/19 2208 183/71 97.8  F (36.6  C) Oral -- 60 20 95 % 1.6 m (5' 3\") 42.3 kg (93 lb 4.1 oz)   02/13/19 2145 173/68 -- -- -- -- -- 97 % -- --   02/13/19 2130 -- -- -- -- -- -- 97 % -- --   02/13/19 2115 180/83 -- -- -- -- -- 98 % -- --   02/13/19 2100 170/83 -- -- 62 -- -- 97 % -- --   02/13/19 2045 167/73 -- -- -- -- -- 97 % -- --   02/13/19 2015 155/70 -- -- -- -- -- 96 % -- --   02/13/19 2000 168/75 -- -- 60 -- -- 94 % -- --   02/13/19 1900 -- -- -- -- -- -- 95 % -- --   02/13/19 1700 184/87 -- -- 60 -- -- 97 % -- --   02/13/19 1650 185/79 98.4  F (36.9  C) Oral -- 61 20 97 % -- 47.6 kg (105 lb)      Wt Readings from Last 5 Encounters:   02/13/19 42.3 kg (93 lb 4.1 oz)   02/04/19 43.1 kg (95 lb)   01/17/19 43.7 kg (96 lb 6.4 oz)   01/09/19 43.5 kg (96 lb)   11/05/18 45.9 kg (101 lb 3.2 oz)     Constitutional:  Pleasant, cachectic w/bitemporal and supraclavicular wasting   Eyes:  Anicteric, PERRL  HEENT:  Moist, no thrush, dentures  Lymph/Hematologic:  No epitrochlear, axillary, anterior or posterior cervical, or supraclavicular lymphadenopathy is appreciated  Cardiovascular:  RRR; solid NT masses in R breast at 9 o'clock, L breast lateral to pacemaker, and on her back right of spine at ~ T10 level  Respiratory:  Clear to auscultation; " unlabored  GI: Soft, non-tender, normal bowel sounds, no hepatosplenomegaly  Genitourinary:  deferred  Musculoskeletal:  Generalized muscle wasting; multiple painless masses as noted under CV  Skin:  Warm, dry  Neurological:  nonfocal  Psych:  Confused, word-finding difficulties, couldn't recall her granddaughter's name, is convinced she has met me in the past (she hasn't).  Affect full.        DATA  ROUTINE ICU LABS (Last four results)  CMP  Recent Labs   Lab 02/14/19 0522 02/13/19  1841   * 131*   POTASSIUM 3.8 4.1   CHLORIDE 97 97   CO2 26 24   ANIONGAP 8 10   GLC 89 120*   BUN 23 30   CR 0.86 1.13*   GFRESTIMATED 59* 42*   GFRESTBLACK 68 49*   SKY 8.7 9.6   PROTTOTAL  --  7.1   ALBUMIN  --  3.5   BILITOTAL  --  0.4   ALKPHOS  --  59   AST  --  22   ALT  --  14     CBC  Recent Labs   Lab 02/14/19 0522 02/13/19  1841   WBC 10.8 13.7*   RBC 3.78* 4.03   HGB 10.9* 11.8   HCT 33.6* 34.7*   MCV 89 86   MCH 28.8 29.3   MCHC 32.4 34.0   RDW 14.7 14.6   * 790*     INRNo lab results found in last 7 days.  Arterial Blood GasNo lab results found in last 7 days.      Recent Results (from the past 48 hour(s))   CT Head w/o Contrast    Narrative    CT SCAN OF THE HEAD WITHOUT CONTRAST   2/13/2019 6:17 PM     HISTORY: Altered level of consciousness (LOC), unexplained; confusion    TECHNIQUE:  Axial images of the head and coronal reformations without  IV contrast material. Radiation dose for this scan was reduced using  automated exposure control, adjustment of the mA and/or kV according  to patient size, or iterative reconstruction technique.    COMPARISON: None.    FINDINGS:  There is diffuse parenchymal volume loss.  White matter  changes are present in the cerebral hemispheres that are consistent  with small vessel ischemic disease in this age patient. There is no  evidence of intracranial hemorrhage, mass, acute infarct or anomaly.  The visualized portions of the sinuses and mastoids appear normal.  There is  no evidence of trauma.      Impression    IMPRESSION: No acute pathology. No bleed, mass, or infarcts are  identified.      LONNY PARISI MD   Chest XR,  PA & LAT    Narrative    CHEST TWO VIEW   2/13/2019 6:18 PM     HISTORY: Shortness of breath.    COMPARISON: 2/4/2019.    FINDINGS: Left subclavian cardiac device in place. No pneumothorax.  The heart size is normal. There is again a mass or consolidation  abutting the aortic arch not significantly changed in interval. The  left hemidiaphragm is elevated. There is small left pleural effusion.  The lungs are otherwise clear.      Impression    IMPRESSION:   1. Persistent mass or consolidation abutting the aortic arch.  2. Small left pleural effusion.   Chest CT w/o contrast    Narrative    CT CHEST WITHOUT CONTRAST  2/13/2019 7:55 PM    HISTORY: Cough, persistent. Lung mass near aorta.    TECHNIQUE: Scans obtained from the apices through the diaphragm  without IV contrast. Radiation dose for this scan was reduced using  automated exposure control, adjustment of the mA and/or kV according  to patient size, or iterative reconstruction technique.    COMPARISON:  None.    FINDINGS: There is a solid mass consistent with malignancy abutting  the AP window aortic arch and superior left hilum. This mass measures  approximately 6.7 cm AP x 4 cm transverse. There is scarring at the  lung apices. A few peripheral bands of probable scarring bilaterally.  There is a pleural-based mass posteriorly on the left measuring up to  2 cm and probably eroding the posterior seventh rib. There is a  calcified granuloma at the left lung base. Very small left pleural  effusion. There is a soft tissue mass in the superior left breast  measuring 1.7 x 1.4 cm. There is no lymph node enlargement. Images  through the upper abdomen are remarkable for a 2 cm x 1.8 cm left  adrenal gland mass which appears larger than on the previous exam.  There is a new 1.6 cm lytic lesion in the T10 vertebral body.  Left  subclavian cardiac device in place.      Impression    IMPRESSION:  1. There is a 6.7 cm mass abutting the left superior mediastinum and  aortic arch, consistent with a malignancy.  2. 2 cm pleural-based mass in the left hemithorax posteriorly eroding  the seventh rib.  3. There is a lytic lesion in the T10 vertebral body.   4. 1.7 cm left superior breast mass may be a malignancy.  5. Trace left pleural effusion.

## 2019-02-14 NOTE — PLAN OF CARE
OT: Orders received for safe discharge recommendations. Physical therapy completed evaluation and is recommending TCU. Discussed with SW as well appears pt has plans to discharge to TCU. Will Hold evaluation for today and will complete OT eval if insurance/TCU requires evaluation for TCU admission or if patient decides to discharge home

## 2019-02-14 NOTE — PROGRESS NOTES
Patient able to take a few steps on admission to Med Surg with assist of 2.  Complained of right sided rib pain with activity.  No family present at admission.  Meds found in bag and were recorded and sent to pharmacy.  Unable to complete home Med rec as patient is confused.  Weekly med container sent to pharmacy with other meds (Sun-Sat) was found and each compartment was full.  Skin intact.  Complains of bi lateral toe/calf pain and requests massage.  Oriented to self only.

## 2019-02-14 NOTE — PROGRESS NOTES
Skin affirmation note    Admitting nurse completed full skin assessment, Steven score and Steven interventions. This writer agrees with the initial skin assessment findings.

## 2019-02-14 NOTE — PROGRESS NOTES
"CLINICAL NUTRITION SERVICES  -  ASSESSMENT NOTE       REASON FOR ASSESSMENT  Maggie Baires is a 89 year old female seen by Registered Dietitian for Admission Nutrition Risk Screen for reduced oral intake over the last month      NUTRITION HISTORY  - Information obtained from pt chart  Mgagie Baires is a 89 year old female with generalized osteoarthrosis, acute coronary syndrome, essential thrombocythemia, hypothyroidism, cardiac dysrhythmia, and near syncope admitted with altered mental status. Per MD note, daughter stated that mother's confusion is been present for the past 3 weeks. She seems it started shortly after she was diagnosed with a possible pneumonia/mass in her chest and was started on Levaquin and hydrocodone which was also given for back pain. The mother had trouble with these medications per daughter and she stopped them fairly quickly.  Since that time she has had decreased concentration and has appeared more confused. Per MD note, evaluation revealed a 6.7 cm mass in the left superior mediastinum consistent with malignancy and concern for lytic lesion possibly metastatic disease. Per MD note, pt reports she was 130 pounds all of her life but has not wanted to eat lately.     Went to meet with pt but was unable as she was in a care meeting with Palliative NP. Progress note from NP and SW shows pt will be transitioned to -Hospice. Nutrition Assessment no longer appropriate at this time.     CURRENT NUTRITION ORDERS  Diet Order:     Low Saturated Fat/Low Cholesterol/2400 mg Sodium     Current Intake/Tolerance:  50% per flowsheet    PHYSICAL FINDINGS  Observed  Unable to assess   Obtained from Chart/Interdisciplinary Team  None noted    ANTHROPOMETRICS  Height: 5' 3\"  Weight: 93 lbs 4.07 oz  Body mass index is 16.52 kg/m .  Weight Status:  Underweight BMI <18.5  IBW: 52.3 kg  % IBW: 81%  Weight History: Per flow sheet pt has lost 3.6 kg in the past 6 months.     LABS  Labs " reviewed    MEDICATIONS  Medications reviewed      ASSESSED NUTRITION NEEDS PER APPROVED PRACTICE GUIDELINES:    Dosing Weight 52.3 kg   Estimated Energy Needs: 6500-5176 kcals (25-30 Kcal/Kg)  Justification: underweight  Estimated Protein Needs: 52-63 grams protein (1-1.2 g pro/Kg)  Justification: preservation of lean body mass  Estimated Fluid Needs: 1569  mL (1 mL/Kcal)  Justification: maintenance    MALNUTRITION:  % Weight Loss:  Up to 5% in 1 month (non-severe malnutrition)  % Intake:  No decreased intake noted  Subcutaneous Fat Loss:  Unable to assess  Muscle Loss:  Unable to assess  Fluid Retention:  None noted    Malnutrition Diagnosis: Unable to determine due to pt assessment no longer appropriate.    NUTRITION DIAGNOSIS:  Inadequate oral intake related to decreased cognitive function and malaise as evidenced by pt reporting no desire to eat, daughter reporting pt's routine has been impaired d/t confusion, 50% or less intake per flowsheet.      NUTRITION INTERVENTIONS  Recommendations / Nutrition Prescription  General healthful diet as able.  .    Implementation  Nutrition education: Not appropriate at this time due to patient condition  .    Nutrition Goals  General healthful diet to be encouraged at -Hospice.   .    MONITORING AND EVALUATION:  Further monitoring and evaluation not appropriate due to transition to hospice.    Sara Jones  [facility] Dietetic Intern        I agree with the dietetic intern's plan of care- Melissa Ferrari RD, LD
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
## 2019-02-14 NOTE — UTILIZATION REVIEW
"  Admission Status; Secondary Review Determination         Under the authority of the Utilization Management Committee, the utilization review process indicated a secondary review on the above patient.  The review outcome is based on review of the medical records, discussions with staff, and applying clinical experience noted on the date of the review.          (x) Observation Status Appropriate - This patient does not meet hospital inpatient criteria and is placed in observation status. If this patient's primary payer is Medicare and was admitted as an inpatient, Condition Code 44 should be used and patient status changed to \"observation\".     RATIONALE FOR DETERMINATION   89 year old female with generalized osteoarthrosis, acute coronary syndrome, essential thrombocythemia, hypothyroidism, cardiac dysrhythmia, and near syncope who presents to the ED with altered mental status. Per ED documentation no obvious source of infection, further evaluation revealed a 6.7 cm mass in the left superior mediastinum consistent with malignancy and concern for lytic lesion possibly metastatic disease. Daughter  stated that mother's confusion is been present for the past 3 weeks.  She seems it started shortly after she was diagnosed with a possible pneumonia/mass in her chest and was started on Levaquin and hydrocodone which was also given for back pain.    Patient admitted for evaluation and coordination of care. Workup for occult malignancy if family decided to pursue workup can be done an outpatient basis and does not meet criteria for prolonged inpatient stay for workup.  Dr Cota notified of this determination.    This document was produced using voice recognition software.      The information on this document is developed by the utilization review team in order for the business office to ensure compliance.  This only denotes the appropriateness of proper admission status and does not reflect the quality of care rendered.  "        The definitions of Inpatient Status and Observation Status used in making the determination above are those provided in the CMS Coverage Manual, Chapter 1 and Chapter 6, section 70.4.      Sincerely,     GLENN GIRALDO MD    System Medical Director  Utilization Management  [facility].

## 2019-02-14 NOTE — PLAN OF CARE
Patient is alert, intermittent confusion. Ate fair today independently after tray set up. Ambulates with stand by assist and walker to bathroom and in frazier twice today. Bed alarm and chair alarm in use, no attempts at self transfers. Medicated with tylenol and lidocaine patch to left mid back. Patient reports some relief from this. Has tramadol if needed for worsening pain. Her pain is not constant. Family present most of the day. Medications from home sent with daughter Kaylee.  Voids without difficulty. Medicated with miralax today for report of chronic constipation. Had large soft formed brown BM this afternoon.

## 2019-02-14 NOTE — PROGRESS NOTES
[facility]  Patient is currently open to home care services with [facility].  The patient is currently receiving RN,PT,OT services.  [facility]  and team have been notified of patient admission.  [facility] liaison will continue to follow patient during stay.  If appropriate provide orders to resume home care at time of discharge.    Marce Hanson RN Liaison   [facility]

## 2019-02-14 NOTE — PROGRESS NOTES
"[facility] ADMISSION NOTE    Patient admitted to room 2402 at approximately 2200 via cart from emergency room. Patient was accompanied by transport tech.     Verbal SBAR report received from Gertrudis TAI prior to patient arrival.     Patient ambulated to bed with assist of 2. Patient alert and oriented X 1. Pain is controlled without any medications. 0-10 Pain Scale: 0. Admission vital signs: Blood pressure 183/71, pulse 62, temperature 97.8  F (36.6  C), temperature source Oral, resp. rate 20, height 1.6 m (5' 3\"), weight 42.3 kg (93 lb 4.1 oz), SpO2 95 %, not currently breastfeeding. Patient was oriented to plan of care, call light, bed controls, tv, telephone, bathroom and visiting hours.     Risk Assessment    The following safety risks were identified during admission: fall. Yellow risk band applied: YES.     Skin Initial Assessment    This writer admitted this patient and completed a full skin assessment and Steven score in the Adult PCS flowsheet. Appropriate interventions initiated as needed.     Secondary skin check completed by Caro MCKEON RN.    Steven Risk Assessment  Sensory Perception: 4-->no impairment  Moisture: 4-->rarely moist  Activity: 3-->walks occasionally  Mobility: 3-->slightly limited  Nutrition: 2-->probably inadequate  Friction and Shear: 3-->no apparent problem  Steven Score: 19  Bed Support Surface: Atmos Air mattress  Steven Intervention(s) Implemented: draw sheets, encouraged fluids, heels suspended, HOB elevated 30 degrees or less    Sagrario Crawford    "

## 2019-02-14 NOTE — PROGRESS NOTES
19 1000   Quick Adds   Type of Visit Initial PT Evaluation   Living Environment   Lives With alone   Living Arrangements apartment   Living Environment Comment Pt receving home care PT,OT, RN services   Functional Level Prior   Ambulation 0-->independent   Transferring 0-->independent   Toileting 0-->independent   Bathing 0-->independent   Communication 0-->understands/communicates without difficulty   Swallowing 0-->swallows foods/liquids without difficulty   Cognition 1 - attention or memory deficits   Fall history within last six months yes   Number of times patient has fallen within last six months 1   Which of the above functional risks had a recent onset or change? none   Prior Functional Level Comment PLOF- Per pt/ family - pt independent w ambulation with no device; recently  able to amb steps in her apartment complex. Daughter reports home care  PT recommending  pt use a jr size rolling walker    General Information   Onset of Illness/Injury or Date of Surgery - Date 19   Referring Physician Derrick MCKEON    Patient/Family Goals Statement Pt did not state goal. Family reports goal of DC to TCU    Pertinent History of Current Problem (include personal factors and/or comorbidities that impact the POC) 89 year old female with generalized osteoarthrosis, acute coronary syndrome, essential thrombocythemia, hypothyroidism, cardiac dysrhythmia, and near syncope who presents to the ED with altered mental status. The patient arrived via EMS after a call from her family due to concern of her wandering from her home. Pt has probable METS. lytic lesion T10   Precautions/Limitations fall precautions   General Observations Pt alert, pleasant-.  No c/o pain.     Cognitive Status Examination   Orientation (NT- states name, )   Level of Consciousness alert   Pain Assessment   Patient Currently in Pain No   Range of Motion (ROM)   ROM Comment WFL    Strength   Strength Comments WFL    Bed Mobility   Bed  "Mobility Comments NT-   Transfer Skills   Transfer Comments SBA sit> stand w/ RW use   Gait   Gait Comments Pt ambulated 100 feet x1 with RW and SBA, steady w/ RW use. Slight difficulty turning with the walker at the end of amb to sit    Balance   Balance Comments Fair / good- did take several steps w/o walker support, CGA for balance   Sensory Examination   Sensory Perception no deficits were identified   Muscle Tone   Muscle Tone Comments NT- Family reports longstanding problem w/ left toes upgoing   General Therapy Interventions   Planned Therapy Interventions balance training;gait training;strengthening   Clinical Impression   Criteria for Skilled Therapeutic Intervention yes, treatment indicated   PT Diagnosis Generalized weaknss, impaired gait   Influenced by the following impairments Decreased strength/ balance   Functional limitations due to impairments altered mobility-    Clinical Presentation Stable/Uncomplicated   Clinical Presentation Rationale clinical judgement   Clinical Decision Making (Complexity) Low complexity   Therapy Frequency` daily   Predicted Duration of Therapy Intervention (days/wks) 2 days   Anticipated Equipment Needs at Discharge front wheeled walker   Anticipated Discharge Disposition Transitional Care Facility  (with transition to approp envirnonment)   Risk & Benefits of therapy have been explained Yes   Patient, Family & other staff in agreement with plan of care Yes   Clinical Impression Comments Would benenfit from short rehab  stay to ensure safety, as pt new to walker use    [facility] AM-PAC  \"6 Clicks\" V.2 Basic Mobility Inpatient Short Form   1. Turning from your back to your side while in a flat bed without using bedrails? 4 - None   2. Moving from lying on your back to sitting on the side of a flat bed without using bedrails? 4 - None   3. Moving to and from a bed to a chair (including a wheelchair)? 4 - None   4. Standing up from a chair using your arms (e.g., " wheelchair, or bedside chair)? 4 - None   5. To walk in hospital room? 3 - A Little   6. Climbing 3-5 steps with a railing? 3 - A Little   Basic Mobility Raw Score (Score out of 24.Lower scores equate to lower levels of function) 22   Total Evaluation Time   Total Evaluation Time (Minutes) 15

## 2019-02-15 PROBLEM — K59.09 CONSTIPATION, CHRONIC: Status: ACTIVE | Noted: 2019-01-01

## 2019-02-15 PROBLEM — G89.3 MALIGNANT BONE PAIN: Status: ACTIVE | Noted: 2019-01-01

## 2019-02-15 PROBLEM — M89.8X9 MALIGNANT BONE PAIN: Status: ACTIVE | Noted: 2019-01-01

## 2019-02-15 NOTE — PLAN OF CARE
. Physical Therapy Discharge Summary    Reason for therapy discharge:    Discharged to LTC/Hospice transition  No further expectations of functional progress.    Progress towards therapy goal(s). See goals on Care Plan in [facility] electronic health record for goal details.  Goals partially met.  Barriers to achieving goals:   limited tolerance for therapy.    Therapy recommendation(s):    Continue home exercise program.   Please Contact me with any questions or concerns. Thank you for for patience and cooperation.     Sebastian Sharif PT, DPT  Flexible Workforce Physical Therapist   [facility]  [email or web address]

## 2019-02-15 NOTE — CONSULTS
Name: Maggie Baires    MRN#: [id number]    Reason for Hospitalization: Confusion [R41.0]    Discharge Date: 2/15/2019    Patient / Family response to discharge plan: This writer met with pt and with pts dtr to further discuss discharge planning. Dtr in agreement for long term care placement with hospice. [facility] (phone: [phone] Fax: [phone]) has already been arranged.     Patient was provided with Medicare certified nursing home list. Pts choices are as follows [facility] (Phone: [phone] Fax: [phone]), [facility] (Main: [phone] Admissions: [phone] Fax: [phone]) and [facility] (Phone: [phone] Admissions: [phone] Fax: [phone]).     The only facility that has a bed at this time is [facility] (Main: [phone] Admissions: [phone] Fax: [phone]). Pt will discharge there today.     This writer went over OBS status with pts dtr at length, she appeared to understand and was in agreement. Pt has  Shaniqua Haney [phone]. Pt is on an alternative care program at this time in the community. She was also open with [facility] ([phone] Fax: [phone]) prior to hospitalization.     MA application was completed this morning with .       Other Providers (Care Coordinator, County Services, PCA services etc): yes, Shaniqua Haney    CTS Hand Off Completed: Yes: completed    PAS #: requested    PABLO Score: none    Future Appointments:   Future Appointments   Date Time Provider Department Center   3/13/2019  9:00 AM [facility]   4/11/2019 10:00 AM [facility] LAB [facility]   4/18/2019 11:00 AM Annabel Pelaez MD [facility]       Discharge Disposition: long term care facility    Discharge Planner   Discharge Plans in progress: LTC with hospice  Barriers to discharge plan: none  Follow up plan: hospice to follow at SNF       Entered by:  Tabitha Flynn 02/15/2019 1:14 PM           Tabitha BERNAL, [facility], [facility] [phone]

## 2019-02-15 NOTE — PLAN OF CARE
OT: Orders received for safe discharge plan. Per rounds pt to discharge to LTC on Hospice. IP OT evaluation not indicated at this time.

## 2019-02-15 NOTE — PLAN OF CARE
Maggie denies pain this morning. Smiles and is appreciative to staff. Up to chair and bathroom with stand by assist and walker. Voids without difficulty. Ambulated long distance in frazier at fast pace with 1 assist, gait belt and walker. Eating 25-30% today. Seems a bit more confused today compared to yesterday. Alarms on for safety. Family present.

## 2019-02-15 NOTE — PLAN OF CARE
Continues to complain of back pain, requested to have Lidoderm patch placed.  Explained that patch will be placed at 8AM.  PRN Tramadol given, and was able to sleep soundly.  Up in room with SBA and encourage to use walker.

## 2019-02-15 NOTE — DISCHARGE SUMMARY
[facility]  Discharge Summary  Hospital Medicine       Date of Admission:  2/13/2019  Date of Discharge:  2/15/2019  3:58 PM  Discharging Provider: Bryon Cota MD      Identification and Chief Compaint: Maggie Baires is a 89 year old female who presented on 2/13/2019 with complaint of two weeks of worsening confusion and back pain.    Discharge Diagnoses   Acute encephalopathy, suspect metabolic due to malignancy  Suspect metastatic cancer, likely lung primary, with metastases to spine, ribs  Back and rib pain due to metastatic cancer  Hyponatremia, chronic, mild  Paroxysmal SVT  Sick sinus syndrome s/p pacemaker  Non-obstructive coronary artery disease  Thrombocytosis  Monoclonal Gammopathy of unknown significance  Anemia, normocytic     Follow-ups Needed After Discharge   Follow-up Appointments     Follow Up and recommended labs and tests      Follow up with hospice               Hospital Course     Maggie Baires is a 89 year old female admitted on 2/13/2019. She has history of sick sinus syndrome s/p pacemaker, paroxysmal SVT, coronary artery disease, hyperlipidemia and hypothyroidism. She presents with 2 weeks of worsening confusion and back pain.     Acute Encephalopathy, suspect metabolic possibly due to brain metasases    Intermittent confusion over past 2 weeks, worse in the past 4 days. Alert, oriented to self, place, situation, not date. Tangential at times. Neurological exam non-focal. CT head normal. TSH normal. WBC initially elevated at 13.7, repeat normal at 10.8. UA negative. CT chest shows new malignancy, no signs of infection. This may be related to pain which has thrown off her sleep cycle recently, daughter reports 3 days with minimal sleep. Malignancy also consider, possible that she may have metastatic disease. No infectious symptoms recently. Prescribed Norco, though has not been taking recently, no other concerning medications.     Patient mentation was  bright and oriented at times and easily confused at other times. Given diagnosis of metastatic cancer, suspect patient may have metastatic disease to brain not evident on CT head. No distress. Will not further work up with MRI brain as not likely to . Overall, patient not likely to improve with time given advanced cancer diagnosis and age.      Generalized Weakness due to cancer, weight loss  Back Pain due to metastatic cancer  Metastatic malignancy, new diagnosis, suspect lung primary but cannot rule out breast     Back pain due to metastatic disease to T10 and also pain from 7th rib erosion and possibly from the large mediastinal mass. CT chest shows 6.7 cm mass near the left superior mediastinum and aortic arch, 2 cm pleural based mass in left hemithorax with 7th rib erosion, 1.7 left breast mass, lytic lesion at T10 as well as a trace left pleural effusion. Discussed with patient and family (daughter, granddaughter), they would like to focus on comfort and likely do not want aggressive measures for this. The primary goal is pain control and quality of life. Palliative care service was consulted. Pain controlled with scheduled acetaminophen and addition of steroids. Patient discharging to long-term care facility on hospice.     Hyponatremia, chronic    Na 131. Last 127 on 2/4/2019. Possibly related to recent poor appetite. Mild, asymptomatic. No intervention needed.     Paroxysmal SVT  Sick sinus syndrome s/p pacemaker  Non-obstructive coronary artery disease    Follows with cardiology, last seen 4/2018. Cardiac medications include atenolol and ASA. Intolerant to statins. Continuing atenolol and aspirin on discharge but would stop if patient decompensates from cancer.      Essential Thrombocytosis  Monoclonal Gammopathy of unknown significance    Platelets 790 --> 650. Recent baseline 518 - 850. Diagnosed 2004. Follows with oncology, last saw Dr. Pelaez 1/2019. Managed with Anagrelide for years. With  change to hospice, the Anagrelide is stopped.      Normocytic Anemia  Felt to be due to anagrelide.       Consultations This Hospital Stay   PALLIATIVE CARE ADULT IP CONSULT    Code Status   DNR/DNI    The discharge plan was discussed with the patient and family at bedside, and they expressed understanding.     Time Spent on this Encounter   Total time on this discharge was 25 minutes.       Bryon Cota MD  [facility]  ______________________________________________________________________    Physical Exam   Vital Signs: Temp: 97.3  F (36.3  C) Temp src: Oral BP: 182/76 Pulse: 60 Heart Rate: 60 Resp: 16 SpO2: 97 % O2 Device: None (Room air)    Weight: 93 lbs 4.07 oz  Constitutional: alert, oriented to situation, some tangential thinking but redirectable, NAD. Appears comfortable.   CV: Regular  Respiratory: CTA bilaterally  GI: Soft, non-tender   Skin: Warm and dry       Primary Care Physician   Dorian Jensen  [address] [id number]     Discharge Disposition   Discharged to long-term care facility with hospice to follow    Condition at discharge: Satisfactory    Significant Results and Procedures   Results for orders placed or performed during the hospital encounter of 02/13/19   Chest XR,  PA & LAT    Narrative    CHEST TWO VIEW   2/13/2019 6:18 PM     HISTORY: Shortness of breath.    COMPARISON: 2/4/2019.    FINDINGS: Left subclavian cardiac device in place. No pneumothorax.  The heart size is normal. There is again a mass or consolidation  abutting the aortic arch not significantly changed in interval. The  left hemidiaphragm is elevated. There is small left pleural effusion.  The lungs are otherwise clear.      Impression    IMPRESSION:   1. Persistent mass or consolidation abutting the aortic arch.  2. Small left pleural effusion.    PHILIP HENSON MD   CT Head w/o Contrast    Narrative    CT SCAN OF THE HEAD WITHOUT CONTRAST   2/13/2019 6:17  PM     HISTORY: Altered level of consciousness (LOC), unexplained; confusion    TECHNIQUE:  Axial images of the head and coronal reformations without  IV contrast material. Radiation dose for this scan was reduced using  automated exposure control, adjustment of the mA and/or kV according  to patient size, or iterative reconstruction technique.    COMPARISON: None.    FINDINGS:  There is diffuse parenchymal volume loss.  White matter  changes are present in the cerebral hemispheres that are consistent  with small vessel ischemic disease in this age patient. There is no  evidence of intracranial hemorrhage, mass, acute infarct or anomaly.  The visualized portions of the sinuses and mastoids appear normal.  There is no evidence of trauma.      Impression    IMPRESSION: No acute pathology. No bleed, mass, or infarcts are  identified.      LONNY PARISI MD   Chest CT w/o contrast    Narrative    CT CHEST WITHOUT CONTRAST  2/13/2019 7:55 PM    HISTORY: Cough, persistent. Lung mass near aorta.    TECHNIQUE: Scans obtained from the apices through the diaphragm  without IV contrast. Radiation dose for this scan was reduced using  automated exposure control, adjustment of the mA and/or kV according  to patient size, or iterative reconstruction technique.    COMPARISON:  None.    FINDINGS: There is a solid mass consistent with malignancy abutting  the AP window aortic arch and superior left hilum. This mass measures  approximately 6.7 cm AP x 4 cm transverse. There is scarring at the  lung apices. A few peripheral bands of probable scarring bilaterally.  There is a pleural-based mass posteriorly on the left measuring up to  2 cm and probably eroding the posterior seventh rib. There is a  calcified granuloma at the left lung base. Very small left pleural  effusion. There is a soft tissue mass in the superior left breast  measuring 1.7 x 1.4 cm. There is no lymph node enlargement. Images  through the upper abdomen are remarkable  for a 2 cm x 1.8 cm left  adrenal gland mass which appears larger than on the previous exam.  There is a new 1.6 cm lytic lesion in the T10 vertebral body. Left  subclavian cardiac device in place.      Impression    IMPRESSION:  1. There is a 6.7 cm mass abutting the left superior mediastinum and  aortic arch, consistent with a malignancy.  2. 2 cm pleural-based mass in the left hemithorax posteriorly eroding  the seventh rib.  3. There is a lytic lesion in the T10 vertebral body.   4. 1.7 cm left superior breast mass may be a malignancy.  5. Trace left pleural effusion.    PHILIP HENSON MD     Procedures    None    Discharge Orders      Home Care Referral      General info for SNF    Length of Stay Estimate: Long Term Care  Condition at Discharge: Stable  Level of care:skilled   Rehabilitation Potential: Poor  Admission H&P remains valid and up-to-date: Yes  Recent Chemotherapy: N/A  Use Nursing Home Standing Orders: Yes     Mantoux instructions    Give two-step Mantoux (PPD) Per Facility Policy Yes     Reason for your hospital stay    Pain and weakness due to new diagnosis of metastatic cancer.     Activity - Up with nursing assistance     Follow Up and recommended labs and tests    Follow up with hospice     Advance Diet as Tolerated    Follow this diet upon discharge:      Regular Diet Adult     Discharge Medications   Current Discharge Medication List      START taking these medications    Details   acetaminophen (TYLENOL) 325 MG tablet Take 3 tablets (975 mg) by mouth 3 times daily  Qty: 270 tablet, Refills: 3    Associated Diagnoses: Malignant bone pain      dexamethasone (DECADRON) 1 MG tablet Take 1 mg by mouth daily (with breakfast) For metastatic bone pain..  Qty: 30 tablet, Refills: 3    Associated Diagnoses: Malignant bone pain      lactobacillus rhamnosus, GG, (CULTURELL) capsule Take 1 capsule by mouth 2 times daily  Qty: 60 capsule, Refills: 0    Associated Diagnoses: Constipation, chronic       Lidocaine (LIDOCARE) 4 % Patch Place 1-2 patches onto the skin every 24 hours Apply to painful area of posterior L ribs every morning (cancer in the bones).  Remove 12 hours later.  Qty: 60 patch, Refills: 3    Comments: OK to substitue with 5% patch if desired  Associated Diagnoses: Malignant bone pain      omeprazole (PRILOSEC) 20 MG DR capsule Take 1 capsule (20 mg) by mouth every morning (before breakfast) For stomach protection while on Decadron.  Qty: 30 capsule, Refills: 3    Associated Diagnoses: Malignant bone pain      polyethylene glycol (MIRALAX/GLYCOLAX) packet Take 17 g by mouth daily Mix in 4 oz water or juice.  Hold dose for loose stools.  Qty: 30 packet, Refills: 3    Associated Diagnoses: Constipation, chronic      traMADol (ULTRAM) 50 MG tablet Take 0.5 tablets (25 mg) by mouth every 6 hours as needed for moderate to severe pain  Qty: 20 tablet, Refills: 0    Associated Diagnoses: Malignant bone pain         CONTINUE these medications which have NOT CHANGED    Details   aspirin (ASA) 81 MG EC tablet Take 81 mg by mouth daily      atenolol (TENORMIN) 25 MG tablet TAKE ONE TABLET BY MOUTH ONE TIME DAILY  Qty: 90 tablet, Refills: 1    Associated Diagnoses: ACS (acute coronary syndrome) (H)      hypromellose (ARTIFICIAL TEARS) 0.5 % SOLN Place 1 drop into both eyes 4 times daily as needed      levothyroxine (SYNTHROID/LEVOTHROID) 75 MCG tablet TAKE ONE TABLET BY MOUTH ONE TIME DAILY  Qty: 90 tablet, Refills: 3    Associated Diagnoses: Hypothyroidism, unspecified type      benzonatate (TESSALON PERLES) 100 MG capsule Take 1 capsule (100 mg) by mouth 3 times daily as needed for cough  Qty: 42 capsule, Refills: 0    Associated Diagnoses: Cough         STOP taking these medications       AGRYLIN 0.5 MG capsule Comments:   Reason for Stopping:         HYDROcodone-acetaminophen (NORCO) 5-325 MG tablet Comments:   Reason for Stopping:         VITAMIN D, CHOLECALCIFEROL, PO Comments:   Reason for Stopping:              Allergies   Allergies   Allergen Reactions     Hydrea [Hydroxyurea] Rash

## 2019-02-15 NOTE — PLAN OF CARE
[facility] DISCHARGE NOTE    Patient discharged to long term care facility at 3:57 PM via wheel chair. Accompanied by daughters and staff. Discharge instructions reviewed with patient and daughters, opportunity offered to ask questions. Prescriptions sent to patients preferred pharmacy. All belongings sent with patient.    Mary Ocampo

## 2019-02-15 NOTE — PROGRESS NOTES
"Palliative Care Follow-up Clinic Note  Date of Admission:  2/13/2019   Visit Date:  February 15, 2019        HISTORY of PRESENT ILLNESS:  Maggie Baires is a 89 year old year old female admitted with altered mental status. She presented to her local clinic on 2/4 for c/o back pain and a CXR at that time showed an \"airspace density\" near the aortic arch.  She was given an antibiotic for possible pneumonia with plans for further CT testing to r/o solid mass.  She was also prescribed Norco for pain, which the patient's daughter held after several days due to confusion.  The confusion persisted and the patient was sent to the ED yesterday for it; a CT demonstrated a solid 6.7 cm mass c/w malignancy, a lytic lesion at T10, and erosion of the posterior left 7th rib.   . She was referred to Palliative Care for exploration of goals of care..        ASSESSMENT & PLAN:     Bone pain primarily over the site of the posterior left 7th rib, worse with movement, likely 2o metastasis from large lung mass  Anorexia with dysguesia  Significant 10% weight loss (105 --> 95 lbs)  Fatigue, neoplastic-related  > Decadron 1 mg po with Omeprazole or Protonix 20 mg qAM for GI protection  > Consider calcitonin nasal spray  > 2/15: having NO pain today--none in the back, behind the neck, nor top of feet, not even with palpation.  She thinks her appetite is better, but she may not be an accurate historian.     Chronic constipation, onset in childhood  > daily Miralax  > 2/15: BM yesterday after Miralax; continue daily Miralax     Advance Care Planning:  > Understanding of condition:  Daughter Lashay and granddaughter Janet understand that this will lead to the patient's death within a few months  > Decisional capacity:  Lacking in patient due to confusion  > Code status:  Full Code per discussion with Lashay, who heard her mother state she wouldn't want it as it would aggravate her pain  > Health Care Directive: NO  > POLST:  No     Goals of Care:  > " daughter, only surviving child, requests placement, preferably at [facility]  > also requests transition from home care to hospice care  > have DC'd Agrylin in light of plans to enroll in hospice, and have advised Dr Pelaez of those plans      Thank you for the opportunity to be of service to this patient and family.      Artem France (Ann), CNP  Palliative Care  Private cell:  [phone]     Discharge Instructions:  From Artem France, Palliative Care NP, Cell [phone]    Andreas (how she pronounces her name) has cancer in her bones; she feels the pain in the posterior L ribs.  The Lidoderm patches work like magic she thinks, and the Dexamethasone will also help provide relief.  Prior to admission, she became quite confused after taking Vicodin, but she appears to be able to handle low dose (25 mg) Tramadol.  Bowels moved 2/14.      Code status is DNR/DNI.    She is to be enrolled with [facility] at the request of her daughter, [phone].    She is known to have a h/o essential thrombocythemia which had been treated with oral Agrylyn, which was discontinued on 2/14 due to her impending hospice enrollment.        Face to face:   0525-2567, 15 min    Non face to face:  9455-9149 and 1055 - 1135, 50 min, > 50% spent reviewing and writing prescriptions in anticipation of discharge, and coordinating care with  attending, nursing and Care Transitions    ========================    SUBJECTIVE:  Has NO pain!  Surprised and pleased.  Eating 30-50% of meals plus snacks.    Ambulates with standby assist.  Bowel movement yesterday.    ROS:  As described in HPI and Subjective.  Otherwise, ALL are negative.    MEDICATIONS:  Allergies   Allergen Reactions     Hydrea [Hydroxyurea] Rash     Scheduled meds:    acetaminophen  975 mg Oral TID     aspirin  81 mg Oral Daily     atenolol  25 mg Oral Daily     dexamethasone  1 mg Oral Daily with breakfast     lactobacillus rhamnosus (GG)  1 capsule Oral BID     levothyroxine   75 mcg Oral QAM AC     lidocaine  1-2 patch Transdermal Q24H     lidocaine   Transdermal Q8H     lidocaine   Transdermal Q24h     omeprazole  20 mg Oral QAM AC     polyethylene glycol  17 g Oral Daily     PRN meds:  benzonatate, naloxone, ondansetron **OR** ondansetron, senna-docusate **OR** senna-docusate, traMADol      OBJECTIVE:  Patient Vitals for the past 24 hrs:   BP Temp Temp src Pulse Heart Rate Resp SpO2   02/15/19 0815 182/76 97.3  F (36.3  C) Oral 60 -- 16 97 %   02/15/19 0326 186/77 97.3  F (36.3  C) Oral 60 -- 16 96 %   02/14/19 2244 141/69 97.4  F (36.3  C) Oral 61 -- 16 97 %   02/14/19 2000 (!) 127/94 98.1  F (36.7  C) Oral 60 -- 16 95 %   02/14/19 1512 150/70 98.2  F (36.8  C) Oral 60 60 18 95 %   02/14/19 1144 122/51 -- Oral 58 -- 18 94 %      Wt Readings from Last 10 Encounters:   02/13/19 42.3 kg (93 lb 4.1 oz)   02/04/19 43.1 kg (95 lb)   01/17/19 43.7 kg (96 lb 6.4 oz)   01/09/19 43.5 kg (96 lb)   11/05/18 45.9 kg (101 lb 3.2 oz)   10/04/18 45.9 kg (101 lb 3.2 oz)   07/09/18 47.2 kg (104 lb)   07/08/18 47.4 kg (104 lb 8 oz)   04/25/18 47.4 kg (104 lb 6.4 oz)   04/13/18 47.2 kg (104 lb)     Awake, alert, smiling, sitting in bedside chair  Quite cachectic: bitemporal wasting, loss of thenar fat pad, protruding scapulae  CV RRR  Lungs clear  Abd soft, NT, + BS  Ext warm w/o edema  MSK: no pain with light palpation of neck, L posterior ribs, dorsal surface of feet    Intake/Output Summary (Last 24 hours) at 2/15/2019 1059  Last data filed at 2/15/2019 1020  Gross per 24 hour   Intake 804 ml   Output 1100 ml   Net -296 ml         ROUTINE ICU LABS (Last four results)  CMP  Recent Labs   Lab 02/14/19  0522 02/13/19  1841   * 131*   POTASSIUM 3.8 4.1   CHLORIDE 97 97   CO2 26 24   ANIONGAP 8 10   GLC 89 120*   BUN 23 30   CR 0.86 1.13*   GFRESTIMATED 59* 42*   GFRESTBLACK 68 49*   SKY 8.7 9.6   PROTTOTAL  --  7.1   ALBUMIN  --  3.5   BILITOTAL  --  0.4   ALKPHOS  --  59   AST  --  22   ALT  --  14      CBC  Recent Labs   Lab 02/14/19  0522 02/13/19  1841   WBC 10.8 13.7*   RBC 3.78* 4.03   HGB 10.9* 11.8   HCT 33.6* 34.7*   MCV 89 86   MCH 28.8 29.3   MCHC 32.4 34.0   RDW 14.7 14.6   * 790*     INRNo lab results found in last 7 days.  Arterial Blood GasNo lab results found in last 7 days.    Recent Results (from the past 48 hour(s))   CT Head w/o Contrast    Narrative    CT SCAN OF THE HEAD WITHOUT CONTRAST   2/13/2019 6:17 PM     HISTORY: Altered level of consciousness (LOC), unexplained; confusion    TECHNIQUE:  Axial images of the head and coronal reformations without  IV contrast material. Radiation dose for this scan was reduced using  automated exposure control, adjustment of the mA and/or kV according  to patient size, or iterative reconstruction technique.    COMPARISON: None.    FINDINGS:  There is diffuse parenchymal volume loss.  White matter  changes are present in the cerebral hemispheres that are consistent  with small vessel ischemic disease in this age patient. There is no  evidence of intracranial hemorrhage, mass, acute infarct or anomaly.  The visualized portions of the sinuses and mastoids appear normal.  There is no evidence of trauma.      Impression    IMPRESSION: No acute pathology. No bleed, mass, or infarcts are  identified.      LONNY PARISI MD   Chest XR,  PA & LAT    Narrative    CHEST TWO VIEW   2/13/2019 6:18 PM     HISTORY: Shortness of breath.    COMPARISON: 2/4/2019.    FINDINGS: Left subclavian cardiac device in place. No pneumothorax.  The heart size is normal. There is again a mass or consolidation  abutting the aortic arch not significantly changed in interval. The  left hemidiaphragm is elevated. There is small left pleural effusion.  The lungs are otherwise clear.      Impression    IMPRESSION:   1. Persistent mass or consolidation abutting the aortic arch.  2. Small left pleural effusion.    PHILIP HENSON MD   Chest CT w/o contrast    Narrative    CT CHEST  WITHOUT CONTRAST  2/13/2019 7:55 PM    HISTORY: Cough, persistent. Lung mass near aorta.    TECHNIQUE: Scans obtained from the apices through the diaphragm  without IV contrast. Radiation dose for this scan was reduced using  automated exposure control, adjustment of the mA and/or kV according  to patient size, or iterative reconstruction technique.    COMPARISON:  None.    FINDINGS: There is a solid mass consistent with malignancy abutting  the AP window aortic arch and superior left hilum. This mass measures  approximately 6.7 cm AP x 4 cm transverse. There is scarring at the  lung apices. A few peripheral bands of probable scarring bilaterally.  There is a pleural-based mass posteriorly on the left measuring up to  2 cm and probably eroding the posterior seventh rib. There is a  calcified granuloma at the left lung base. Very small left pleural  effusion. There is a soft tissue mass in the superior left breast  measuring 1.7 x 1.4 cm. There is no lymph node enlargement. Images  through the upper abdomen are remarkable for a 2 cm x 1.8 cm left  adrenal gland mass which appears larger than on the previous exam.  There is a new 1.6 cm lytic lesion in the T10 vertebral body. Left  subclavian cardiac device in place.      Impression    IMPRESSION:  1. There is a 6.7 cm mass abutting the left superior mediastinum and  aortic arch, consistent with a malignancy.  2. 2 cm pleural-based mass in the left hemithorax posteriorly eroding  the seventh rib.  3. There is a lytic lesion in the T10 vertebral body.   4. 1.7 cm left superior breast mass may be a malignancy.  5. Trace left pleural effusion.    PHILIP HENSON MD

## 2019-02-15 NOTE — PROGRESS NOTES
Clinic Care Coordination Contact    Situation: Patient chart reviewed by care coordinator.    Background: Patient was admitted to [facility] on 2/13/2019 for malignant bone pain.    Assessment: Received CTS handoff for patient.  Per assessment done at hospital patient is discharging to long-term care with hospice through [facility].    Plan/Recommendations: Care coordination will not follow-up as patient is going into long-term care with hospice.    FEMI Horvath, Clinic Care Coordinator 2/15/2019   3:22 PM  [phone]

## 2019-02-15 NOTE — UTILIZATION REVIEW
PAS completed information has been submitted on February 15th, 2019 at 03:04:44 PM CST. The confirmation number is [id number]

## 2019-02-18 NOTE — TELEPHONE ENCOUNTER
Staff called noting that patient did not receive her AM/Afternoon medications as they had not been processed into the computer correctly upon admission so did not present on the patient's MAR. She missed the following medications:    Miralax    Omeprazole    Synthroid    Culturell    Decadron    ASA    Atenolol    Tylenol    New Orders:    Gave order to administer Miralax (if no BM yet today); Omeprazole; Culturell; ASA; and Atenolol    Held Decadon (it was 1630) and Synthroid    Patient received other scheduled doses of Tylenol and pain controlled at this time.    ANASTASIA Davis, TEA  [facility]

## 2019-02-19 NOTE — LETTER
2/19/2019        RE: Maggie Baires  [address]  [place] [address]        [facility]  PRIMARY CARE PROVIDER AND CLINIC:  Dorian Jensen MD, [address] [facility] [address]  Chief Complaint   Patient presents with     Hospital F/U       [facility] Medical Record Number:  [id number]  Place of Service where encounter took place:  [facility] [[id number]]    HPI:    Maggie Baires  is a 89 year old  (1/10/1928), admitted to the above facility from  [facility]. Hospital stay 2/13/19 through 2/15/19. Admitted to this facility for  hospice. HPI information obtained from: facility chart records, facility staff, patient report and [facility] chart review.     Patient admitted to [facility] 2/13/19 for confusion and weakness.   CT scan of chest:   IMPRESSION:  1. There is a 6.7 cm mass abutting the left superior mediastinum and  aortic arch, consistent with a malignancy.  2. 2 cm pleural-based mass in the left hemithorax posteriorly eroding  the seventh rib.  3. There is a lytic lesion in the T10 vertebral body.   4. 1.7 cm left superior breast mass may be a malignancy.  5. Trace left pleural effusion.    Acute cause of infection ruled out as a cause of acute confusion. MD felt acute encephalopathy related to metastases to brain. MRI imaging was not done as family choosing a palliative approach.     Met with patient and patient's family in her room today. Patient's dtr able to give hx. Patient having significant back pain prior to scheduling morphine. Patient's dtr feels pain is controlled today. Appetite has been diminished. Patient is not able to verbalize her last BM. Medication list reviewed with patient's dtr.     Met with hospice. They will add prn haldol for agitation and monitor this closely.     CODE STATUS/ADVANCE DIRECTIVES DISCUSSION:   Hospice  Patient's living condition: lives in a skilled nursing  facility  ALLERGIES: Hydrea [hydroxyurea]  PAST MEDICAL HISTORY:  has a past medical history of ACS (acute coronary syndrome) (H) (11/1/2013), CAD (coronary artery disease) (2/14/2019), ET (essential thrombocythemia) (H) (4/20/2010), Generalized osteoarthrosis, unspecified site, Hyperlipidemia LDL goal <70 (11/12/2013), HYPOTHYROIDISM NOS (1/11/2007), Malignant neoplasm (H), Other specified cardiac dysrhythmias(427.89), Primary localized osteoarthrosis, lower leg, Qualitative platelet defects (H), Thyrotoxicosis without mention of goiter or other cause, without mention of thyrotoxic crisis or storm, Unspecified cataract (5/2004), Unspecified cataract (6/2004), and Vitamin D deficiency (1/29/2010). She also has no past medical history of Asthma, Congestive heart failure, unspecified, COPD (chronic obstructive pulmonary disease) (H), Diabetes mellitus (H), Difficult intubation, History of blood transfusion, Hypertension, Malignant hyperthermia, PONV (postoperative nausea and vomiting), Spinal headache, or Unspecified cerebral artery occlusion with cerebral infarction.  PAST SURGICAL HISTORY:   has a past surgical history that includes surgical history of - ; surgical history of - ; surgical history of - ; surgical history of - ; surgical history of - ; colonoscopy (12-19-95); and cardiac catherization (11/1/13).  FAMILY HISTORY: Maggie Baires had no medications administered during this visit.  SOCIAL HISTORY:   reports that she quit smoking about 53 years ago. She has a 20.00 pack-year smoking history. she has never used smokeless tobacco. She reports that she does not drink alcohol or use drugs.  Post Discharge Medication Reconciliation Status: discharge medications reconciled, continue medications without change    Current Outpatient Medications   Medication Sig Dispense Refill     acetaminophen (TYLENOL) 325 MG tablet Take 3 tablets (975 mg) by mouth 3 times daily 270 tablet 3     atenolol (TENORMIN) 25 MG tablet  TAKE ONE TABLET BY MOUTH ONE TIME DAILY 90 tablet 1     atropine 1 % SOLN Place 2 drops under the tongue every 2 hours as needed for secretions       benzonatate (TESSALON PERLES) 100 MG capsule Take 1 capsule (100 mg) by mouth 3 times daily as needed for cough 42 capsule 0     bisacodyl (DULCOLAX) 10 MG suppository Place 1 suppository (10 mg) rectally daily as needed for constipation       dexamethasone (DECADRON) 1 MG tablet Take 1 mg by mouth daily (with breakfast) For metastatic bone pain.. 30 tablet 3     haloperidol (HALDOL) 2 MG/ML (HIGH CONC) solution Take 0.13 mLs (0.26 mg) by mouth every 8 hours AND 0.5 MG PO Q4H PRN X 14 DAYS 35.1 mL 3     hypromellose (ARTIFICIAL TEARS) 0.5 % SOLN Place 1 drop into both eyes 4 times daily as needed       levothyroxine (SYNTHROID/LEVOTHROID) 50 MCG tablet Take 1 tablet (50 mcg) by mouth daily 90 tablet 3     Lidocaine (LIDOCARE) 4 % Patch Place 1-2 patches onto the skin every 24 hours Apply to painful area of posterior L ribs every morning (cancer in the bones).  Remove 12 hours later. 60 patch 3     magnesium hydroxide (MILK OF MAGNESIA) 400 MG/5ML suspension Take 30 mLs by mouth as needed for constipation (every 2 days)       morphine sulfate, high concentrate, (ROXANOL-CONCENTRATED) 20 MG/ML concentrated solution Take 0.125 mLs (2.5 mg) by mouth every 8 hours And 2.5 mg PO Q2H PRN 42 mL      omeprazole (PRILOSEC) 20 MG DR capsule Take 1 capsule (20 mg) by mouth every morning (before breakfast) For stomach protection while on Decadron. 30 capsule 3     polyethylene glycol (MIRALAX/GLYCOLAX) packet Take 17 g by mouth daily Mix in 4 oz water or juice.  Hold dose for loose stools. 30 packet 3     aspirin (ASA) 81 MG EC tablet Take 81 mg by mouth daily       lactobacillus rhamnosus, GG, (CULTURELL) capsule Take 1 capsule by mouth 2 times daily (Patient not taking: Reported on 2/19/2019) 60 capsule 0     levothyroxine (SYNTHROID/LEVOTHROID) 75 MCG tablet TAKE ONE TABLET BY  "MOUTH ONE TIME DAILY (Patient not taking: Reported on 2/19/2019) 90 tablet 3     traMADol (ULTRAM) 50 MG tablet Take 0.5 tablets (25 mg) by mouth every 6 hours as needed for moderate to severe pain (Patient not taking: Reported on 2/19/2019) 20 tablet 0       ROS:  Unobtainable secondary to cognitive impairment.     Exam:  /71   Pulse 72   Temp 98.6  F (37  C)   Resp 20   Ht 1.6 m (5' 3\")   Wt 42.6 kg (93 lb 14.4 oz)   SpO2 96%   BMI 16.63 kg/m     GENERAL APPEARANCE:  in no distress, somnolent  RESP:  respiratory effort and palpation of chest normal, lungs clear to auscultation , no respiratory distress  CV:  Palpation and auscultation of heart done , regular rate and rhythm, no murmur, rub, or gallop  ABDOMEN:  normal bowel sounds, soft, nontender, no hepatosplenomegaly or other masses  PSYCH:  memory impaired     Lab/Diagnostic data:  Labs done in SNF are in [facility] EPIC. Please refer to them using [facility]Care Everywhere.    ASSESSMENT/PLAN:  Current [facility] scheduled appointments:  Next 5 appointments (look out 90 days)    Apr 18, 2019 11:00 AM CDT  Return Visit with Annabel Pelaez MD  [facility] ([facility]  [address] [address]  [phone]         Malignant neoplasm of lower lobe of left lung (H)  Breast mass  Metastatic cancer to bone (H)  Malignant bone pain: posterior L ribs, neck  - patient with 6.7 cm mass in left mediastinum, 2 cm mass eroding 7th rib and lytic lesion T10  - Patient started on hospice. Morphine scheduled and prn for significant bone pain  - Patient appears comfortable today. Hospice and nsg to follow closely and update NP with any concerns.     Acute encephalopathy  - ? narcs vs brain mets.   - hospice has added prn haldol. Update NP with concerns.     Hypothyroidism, unspecified type  - continue synthroid for now. Can stop if patient not able to swallow    ET (essential thrombocythemia) " (H)  -Platelets 790 --> 650. Recent baseline 518 - 850. Diagnosed 2004.  - previously following with Dr Pelaez. Roman has been stopped    Hospice care patient  - care discussed with hospice RN and patient's dtr. Goal is comfort. Will stop ASA and lactobacillis today  - can stop other po medications if patient having difficulty swallowing  - continue morphine and haldol for comfort     Orders:  1) Discontinue Lactobacillus  2) Decrease Synthroid to 50 mcg PO every day  3) Discontinue ASA    Total time spent with patient visit at the [facility] was 48 including patient visit, review of past records and discussion with hospice and family. Greater than 50% of total time spent with counseling and coordinating care due to discussion with hospice and family  Electronically signed by:  ANASTASIA Nicole CNP                 Sincerely,        ANASTASIA Nicole CNP

## 2019-02-19 NOTE — PROGRESS NOTES
[facility]  PRIMARY CARE PROVIDER AND CLINIC:  Dorian Jensen MD, [address] [facility] [address]  Chief Complaint   Patient presents with     Hospital F/U       [facility] Record Number:  [id number]  Place of Service where encounter took place:  [facility] [[id number]]    HPI:    Maggie Baires  is a 89 year old  (1/10/1928), admitted to the above facility from  [facility]. Hospital stay 2/13/19 through 2/15/19. Admitted to this facility for  hospice. HPI information obtained from: facility chart records, facility staff, patient report and [facility] chart review.     Patient admitted to [facility] 2/13/19 for confusion and weakness.   CT scan of chest:   IMPRESSION:  1. There is a 6.7 cm mass abutting the left superior mediastinum and  aortic arch, consistent with a malignancy.  2. 2 cm pleural-based mass in the left hemithorax posteriorly eroding  the seventh rib.  3. There is a lytic lesion in the T10 vertebral body.   4. 1.7 cm left superior breast mass may be a malignancy.  5. Trace left pleural effusion.    Acute cause of infection ruled out as a cause of acute confusion. MD felt acute encephalopathy related to metastases to brain. MRI imaging was not done as family choosing a palliative approach.     Met with patient and patient's family in her room today. Patient's dtr able to give hx. Patient having significant back pain prior to scheduling morphine. Patient's dtr feels pain is controlled today. Appetite has been diminished. Patient is not able to verbalize her last BM. Medication list reviewed with patient's dtr.     Met with hospice. They will add prn haldol for agitation and monitor this closely.     CODE STATUS/ADVANCE DIRECTIVES DISCUSSION:   Hospice  Patient's living condition: lives in a skilled nursing facility  ALLERGIES: Hydrea [hydroxyurea]  PAST MEDICAL HISTORY:  has a past medical history of ACS (acute  coronary syndrome) (H) (11/1/2013), CAD (coronary artery disease) (2/14/2019), ET (essential thrombocythemia) (H) (4/20/2010), Generalized osteoarthrosis, unspecified site, Hyperlipidemia LDL goal <70 (11/12/2013), HYPOTHYROIDISM NOS (1/11/2007), Malignant neoplasm (H), Other specified cardiac dysrhythmias(427.89), Primary localized osteoarthrosis, lower leg, Qualitative platelet defects (H), Thyrotoxicosis without mention of goiter or other cause, without mention of thyrotoxic crisis or storm, Unspecified cataract (5/2004), Unspecified cataract (6/2004), and Vitamin D deficiency (1/29/2010). She also has no past medical history of Asthma, Congestive heart failure, unspecified, COPD (chronic obstructive pulmonary disease) (H), Diabetes mellitus (H), Difficult intubation, History of blood transfusion, Hypertension, Malignant hyperthermia, PONV (postoperative nausea and vomiting), Spinal headache, or Unspecified cerebral artery occlusion with cerebral infarction.  PAST SURGICAL HISTORY:   has a past surgical history that includes surgical history of - ; surgical history of - ; surgical history of - ; surgical history of - ; surgical history of - ; colonoscopy (12-19-95); and cardiac catherization (11/1/13).  FAMILY HISTORY: Maggie Baires had no medications administered during this visit.  SOCIAL HISTORY:   reports that she quit smoking about 53 years ago. She has a 20.00 pack-year smoking history. she has never used smokeless tobacco. She reports that she does not drink alcohol or use drugs.  Post Discharge Medication Reconciliation Status: discharge medications reconciled, continue medications without change    Current Outpatient Medications   Medication Sig Dispense Refill     acetaminophen (TYLENOL) 325 MG tablet Take 3 tablets (975 mg) by mouth 3 times daily 270 tablet 3     atenolol (TENORMIN) 25 MG tablet TAKE ONE TABLET BY MOUTH ONE TIME DAILY 90 tablet 1     atropine 1 % SOLN Place 2 drops under the tongue  every 2 hours as needed for secretions       benzonatate (TESSALON PERLES) 100 MG capsule Take 1 capsule (100 mg) by mouth 3 times daily as needed for cough 42 capsule 0     bisacodyl (DULCOLAX) 10 MG suppository Place 1 suppository (10 mg) rectally daily as needed for constipation       dexamethasone (DECADRON) 1 MG tablet Take 1 mg by mouth daily (with breakfast) For metastatic bone pain.. 30 tablet 3     haloperidol (HALDOL) 2 MG/ML (HIGH CONC) solution Take 0.13 mLs (0.26 mg) by mouth every 8 hours AND 0.5 MG PO Q4H PRN X 14 DAYS 35.1 mL 3     hypromellose (ARTIFICIAL TEARS) 0.5 % SOLN Place 1 drop into both eyes 4 times daily as needed       levothyroxine (SYNTHROID/LEVOTHROID) 50 MCG tablet Take 1 tablet (50 mcg) by mouth daily 90 tablet 3     Lidocaine (LIDOCARE) 4 % Patch Place 1-2 patches onto the skin every 24 hours Apply to painful area of posterior L ribs every morning (cancer in the bones).  Remove 12 hours later. 60 patch 3     magnesium hydroxide (MILK OF MAGNESIA) 400 MG/5ML suspension Take 30 mLs by mouth as needed for constipation (every 2 days)       morphine sulfate, high concentrate, (ROXANOL-CONCENTRATED) 20 MG/ML concentrated solution Take 0.125 mLs (2.5 mg) by mouth every 8 hours And 2.5 mg PO Q2H PRN 42 mL      omeprazole (PRILOSEC) 20 MG DR capsule Take 1 capsule (20 mg) by mouth every morning (before breakfast) For stomach protection while on Decadron. 30 capsule 3     polyethylene glycol (MIRALAX/GLYCOLAX) packet Take 17 g by mouth daily Mix in 4 oz water or juice.  Hold dose for loose stools. 30 packet 3     aspirin (ASA) 81 MG EC tablet Take 81 mg by mouth daily       lactobacillus rhamnosus, GG, (CULTURELL) capsule Take 1 capsule by mouth 2 times daily (Patient not taking: Reported on 2/19/2019) 60 capsule 0     levothyroxine (SYNTHROID/LEVOTHROID) 75 MCG tablet TAKE ONE TABLET BY MOUTH ONE TIME DAILY (Patient not taking: Reported on 2/19/2019) 90 tablet 3     traMADol (ULTRAM) 50 MG  "tablet Take 0.5 tablets (25 mg) by mouth every 6 hours as needed for moderate to severe pain (Patient not taking: Reported on 2/19/2019) 20 tablet 0       ROS:  Unobtainable secondary to cognitive impairment.     Exam:  /71   Pulse 72   Temp 98.6  F (37  C)   Resp 20   Ht 1.6 m (5' 3\")   Wt 42.6 kg (93 lb 14.4 oz)   SpO2 96%   BMI 16.63 kg/m    GENERAL APPEARANCE:  in no distress, somnolent  RESP:  respiratory effort and palpation of chest normal, lungs clear to auscultation , no respiratory distress  CV:  Palpation and auscultation of heart done , regular rate and rhythm, no murmur, rub, or gallop  ABDOMEN:  normal bowel sounds, soft, nontender, no hepatosplenomegaly or other masses  PSYCH:  memory impaired     Lab/Diagnostic data:  Labs done in SNF are in [facility] EPIC. Please refer to them using [employer] Everywhere.    ASSESSMENT/PLAN:  Current [facility] scheduled appointments:  Next 5 appointments (look out 90 days)    Apr 18, 2019 11:00 AM CDT  Return Visit with Annabel Pelaez MD  [facility] ([facility]) [facility]  [address]  [facility] [address] [phone]         Malignant neoplasm of lower lobe of left lung (H)  Breast mass  Metastatic cancer to bone (H)  Malignant bone pain: posterior L ribs, neck  - patient with 6.7 cm mass in left mediastinum, 2 cm mass eroding 7th rib and lytic lesion T10  - Patient started on hospice. Morphine scheduled and prn for significant bone pain  - Patient appears comfortable today. Hospice and nsg to follow closely and update NP with any concerns.     Acute encephalopathy  - ? narcs vs brain mets.   - hospice has added prn haldol. Update NP with concerns.     Hypothyroidism, unspecified type  - continue synthroid for now. Can stop if patient not able to swallow    ET (essential thrombocythemia) (H)  -Platelets 790 --> 650. Recent baseline 518 - 850. Diagnosed 2004.  - previously following with Dr Pelaez. " Agrelin has been stopped    Hospice care patient  - care discussed with hospice RN and patient's dtr. Goal is comfort. Will stop ASA and lactobacillis today  - can stop other po medications if patient having difficulty swallowing  - continue morphine and haldol for comfort     Orders:  1) Discontinue Lactobacillus  2) Decrease Synthroid to 50 mcg PO every day  3) Discontinue ASA    Total time spent with patient visit at the [facility] was 48 including patient visit, review of past records and discussion with hospice and family. Greater than 50% of total time spent with counseling and coordinating care due to discussion with hospice and family  Electronically signed by:  ANASTASIA Nicole CNP

## 2019-02-20 PROBLEM — N63.0 BREAST MASS: Status: ACTIVE | Noted: 2019-01-01

## 2019-02-20 PROBLEM — C79.51 METASTATIC CANCER TO BONE (H): Status: ACTIVE | Noted: 2019-01-01

## 2019-02-20 PROBLEM — C34.32 MALIGNANT NEOPLASM OF LOWER LOBE OF LEFT LUNG (H): Status: ACTIVE | Noted: 2019-01-01

## 2019-02-20 PROBLEM — Z51.5 HOSPICE CARE PATIENT: Status: ACTIVE | Noted: 2019-01-01
